# Patient Record
Sex: MALE | Race: WHITE | NOT HISPANIC OR LATINO | Employment: OTHER
[De-identification: names, ages, dates, MRNs, and addresses within clinical notes are randomized per-mention and may not be internally consistent; named-entity substitution may affect disease eponyms.]

---

## 2017-02-03 ENCOUNTER — RECORDS - HEALTHEAST (OUTPATIENT)
Dept: ADMINISTRATIVE | Facility: OTHER | Age: 57
End: 2017-02-03

## 2020-01-04 ENCOUNTER — RECORDS - HEALTHEAST (OUTPATIENT)
Dept: ADMINISTRATIVE | Facility: OTHER | Age: 60
End: 2020-01-04

## 2020-01-04 LAB
CHOLEST SERPL-MCNC: 174 MG/DL
HDLC SERPL-MCNC: 50 MG/DL
LDLC SERPL CALC-MCNC: 107 MG/DL
NON HDL CHOL. (LDL+VLDL): 124 MG/DL
TRIGLYCERIDES (HISTORICAL CONVERSION): 77 MG/DL

## 2020-02-20 ENCOUNTER — OFFICE VISIT - HEALTHEAST (OUTPATIENT)
Dept: FAMILY MEDICINE | Facility: CLINIC | Age: 60
End: 2020-02-20

## 2020-02-20 DIAGNOSIS — Z00.00 HEALTH CARE MAINTENANCE: ICD-10-CM

## 2020-02-20 DIAGNOSIS — R73.01 IMPAIRED FASTING GLUCOSE: ICD-10-CM

## 2020-02-20 DIAGNOSIS — N40.1 BENIGN PROSTATIC HYPERPLASIA WITH URINARY FREQUENCY: ICD-10-CM

## 2020-02-20 DIAGNOSIS — E78.5 HYPERLIPIDEMIA, UNSPECIFIED HYPERLIPIDEMIA TYPE: ICD-10-CM

## 2020-02-20 DIAGNOSIS — J45.20 MILD INTERMITTENT ASTHMA WITHOUT COMPLICATION: ICD-10-CM

## 2020-02-20 DIAGNOSIS — F32.1 CURRENT MODERATE EPISODE OF MAJOR DEPRESSIVE DISORDER WITHOUT PRIOR EPISODE (H): ICD-10-CM

## 2020-02-20 DIAGNOSIS — G47.00 INSOMNIA, UNSPECIFIED TYPE: ICD-10-CM

## 2020-02-20 DIAGNOSIS — R35.0 BENIGN PROSTATIC HYPERPLASIA WITH URINARY FREQUENCY: ICD-10-CM

## 2020-02-20 DIAGNOSIS — Z12.11 SCREEN FOR COLON CANCER: ICD-10-CM

## 2020-02-20 LAB — HBA1C MFR BLD: 6 % (ref 3.5–6)

## 2020-02-20 RX ORDER — AZELASTINE HYDROCHLORIDE AND FLUTICASONE PROPIONATE 137; 50 UG/1; UG/1
SPRAY, METERED NASAL
Status: SHIPPED | COMMUNITY
Start: 2019-12-28 | End: 2021-11-01

## 2020-02-20 RX ORDER — NAPROXEN SODIUM 220 MG
220 TABLET ORAL 2 TIMES DAILY WITH MEALS
Status: SHIPPED | COMMUNITY
Start: 2020-02-20 | End: 2023-07-26

## 2020-02-20 ASSESSMENT — ANXIETY QUESTIONNAIRES
2. NOT BEING ABLE TO STOP OR CONTROL WORRYING: NEARLY EVERY DAY
3. WORRYING TOO MUCH ABOUT DIFFERENT THINGS: NEARLY EVERY DAY
5. BEING SO RESTLESS THAT IT IS HARD TO SIT STILL: MORE THAN HALF THE DAYS
6. BECOMING EASILY ANNOYED OR IRRITABLE: NEARLY EVERY DAY
GAD7 TOTAL SCORE: 19
1. FEELING NERVOUS, ANXIOUS, OR ON EDGE: NEARLY EVERY DAY
4. TROUBLE RELAXING: MORE THAN HALF THE DAYS
7. FEELING AFRAID AS IF SOMETHING AWFUL MIGHT HAPPEN: NEARLY EVERY DAY
IF YOU CHECKED OFF ANY PROBLEMS ON THIS QUESTIONNAIRE, HOW DIFFICULT HAVE THESE PROBLEMS MADE IT FOR YOU TO DO YOUR WORK, TAKE CARE OF THINGS AT HOME, OR GET ALONG WITH OTHER PEOPLE: EXTREMELY DIFFICULT

## 2020-02-20 ASSESSMENT — MIFFLIN-ST. JEOR: SCORE: 1842.96

## 2020-02-20 ASSESSMENT — PATIENT HEALTH QUESTIONNAIRE - PHQ9: SUM OF ALL RESPONSES TO PHQ QUESTIONS 1-9: 19

## 2020-02-23 ENCOUNTER — COMMUNICATION - HEALTHEAST (OUTPATIENT)
Dept: FAMILY MEDICINE | Facility: CLINIC | Age: 60
End: 2020-02-23

## 2020-02-28 ENCOUNTER — RECORDS - HEALTHEAST (OUTPATIENT)
Dept: HEALTH INFORMATION MANAGEMENT | Facility: CLINIC | Age: 60
End: 2020-02-28

## 2020-03-16 ENCOUNTER — OFFICE VISIT - HEALTHEAST (OUTPATIENT)
Dept: FAMILY MEDICINE | Facility: CLINIC | Age: 60
End: 2020-03-16

## 2020-03-16 DIAGNOSIS — F32.1 CURRENT MODERATE EPISODE OF MAJOR DEPRESSIVE DISORDER WITHOUT PRIOR EPISODE (H): ICD-10-CM

## 2020-03-16 DIAGNOSIS — N40.0 BENIGN PROSTATIC HYPERPLASIA WITHOUT LOWER URINARY TRACT SYMPTOMS: ICD-10-CM

## 2020-03-16 DIAGNOSIS — J45.20 MILD INTERMITTENT ASTHMA WITHOUT COMPLICATION: ICD-10-CM

## 2020-03-16 RX ORDER — TAMSULOSIN HYDROCHLORIDE 0.4 MG/1
CAPSULE ORAL DAILY
Status: SHIPPED | COMMUNITY
Start: 2020-02-28 | End: 2023-02-26

## 2020-05-26 ENCOUNTER — RECORDS - HEALTHEAST (OUTPATIENT)
Dept: ADMINISTRATIVE | Facility: OTHER | Age: 60
End: 2020-05-26

## 2020-12-03 ENCOUNTER — RECORDS - HEALTHEAST (OUTPATIENT)
Dept: ADMINISTRATIVE | Facility: OTHER | Age: 60
End: 2020-12-03

## 2020-12-04 ENCOUNTER — OFFICE VISIT - HEALTHEAST (OUTPATIENT)
Dept: FAMILY MEDICINE | Facility: CLINIC | Age: 60
End: 2020-12-04

## 2020-12-04 DIAGNOSIS — F32.1 CURRENT MODERATE EPISODE OF MAJOR DEPRESSIVE DISORDER WITHOUT PRIOR EPISODE (H): ICD-10-CM

## 2020-12-04 ASSESSMENT — ANXIETY QUESTIONNAIRES
7. FEELING AFRAID AS IF SOMETHING AWFUL MIGHT HAPPEN: SEVERAL DAYS
1. FEELING NERVOUS, ANXIOUS, OR ON EDGE: SEVERAL DAYS
3. WORRYING TOO MUCH ABOUT DIFFERENT THINGS: SEVERAL DAYS
4. TROUBLE RELAXING: NOT AT ALL
GAD7 TOTAL SCORE: 6
5. BEING SO RESTLESS THAT IT IS HARD TO SIT STILL: SEVERAL DAYS
2. NOT BEING ABLE TO STOP OR CONTROL WORRYING: SEVERAL DAYS
6. BECOMING EASILY ANNOYED OR IRRITABLE: SEVERAL DAYS

## 2020-12-04 ASSESSMENT — PATIENT HEALTH QUESTIONNAIRE - PHQ9: SUM OF ALL RESPONSES TO PHQ QUESTIONS 1-9: 8

## 2021-01-04 ENCOUNTER — OFFICE VISIT - HEALTHEAST (OUTPATIENT)
Dept: FAMILY MEDICINE | Facility: CLINIC | Age: 61
End: 2021-01-04

## 2021-01-04 DIAGNOSIS — F32.1 CURRENT MODERATE EPISODE OF MAJOR DEPRESSIVE DISORDER WITHOUT PRIOR EPISODE (H): ICD-10-CM

## 2021-01-04 RX ORDER — ESCITALOPRAM OXALATE 20 MG/1
20 TABLET ORAL DAILY
Qty: 90 TABLET | Refills: 2 | Status: SHIPPED | OUTPATIENT
Start: 2021-01-04 | End: 2021-10-11

## 2021-01-04 ASSESSMENT — PATIENT HEALTH QUESTIONNAIRE - PHQ9: SUM OF ALL RESPONSES TO PHQ QUESTIONS 1-9: 8

## 2021-01-12 ENCOUNTER — RECORDS - HEALTHEAST (OUTPATIENT)
Dept: ADMINISTRATIVE | Facility: OTHER | Age: 61
End: 2021-01-12

## 2021-01-25 ENCOUNTER — RECORDS - HEALTHEAST (OUTPATIENT)
Dept: ADMINISTRATIVE | Facility: OTHER | Age: 61
End: 2021-01-25

## 2021-02-01 ENCOUNTER — RECORDS - HEALTHEAST (OUTPATIENT)
Dept: ADMINISTRATIVE | Facility: OTHER | Age: 61
End: 2021-02-01

## 2021-03-25 ENCOUNTER — IMMUNIZATION (OUTPATIENT)
Dept: NURSING | Facility: CLINIC | Age: 61
End: 2021-03-25
Payer: COMMERCIAL

## 2021-03-25 PROCEDURE — 0001A PR COVID VAC PFIZER DIL RECON 30 MCG/0.3 ML IM: CPT

## 2021-03-25 PROCEDURE — 91300 PR COVID VAC PFIZER DIL RECON 30 MCG/0.3 ML IM: CPT

## 2021-04-15 ENCOUNTER — IMMUNIZATION (OUTPATIENT)
Dept: NURSING | Facility: CLINIC | Age: 61
End: 2021-04-15
Attending: INTERNAL MEDICINE
Payer: COMMERCIAL

## 2021-04-15 PROCEDURE — 0002A PR COVID VAC PFIZER DIL RECON 30 MCG/0.3 ML IM: CPT

## 2021-04-15 PROCEDURE — 91300 PR COVID VAC PFIZER DIL RECON 30 MCG/0.3 ML IM: CPT

## 2021-05-02 ENCOUNTER — HEALTH MAINTENANCE LETTER (OUTPATIENT)
Age: 61
End: 2021-05-02

## 2021-05-26 ASSESSMENT — PATIENT HEALTH QUESTIONNAIRE - PHQ9: SUM OF ALL RESPONSES TO PHQ QUESTIONS 1-9: 8

## 2021-05-27 ASSESSMENT — PATIENT HEALTH QUESTIONNAIRE - PHQ9
SUM OF ALL RESPONSES TO PHQ QUESTIONS 1-9: 19
SUM OF ALL RESPONSES TO PHQ QUESTIONS 1-9: 8

## 2021-05-28 ASSESSMENT — ANXIETY QUESTIONNAIRES
GAD7 TOTAL SCORE: 19
GAD7 TOTAL SCORE: 6

## 2021-05-28 ASSESSMENT — ASTHMA QUESTIONNAIRES: ACT_TOTALSCORE: 20

## 2021-06-04 VITALS
DIASTOLIC BLOOD PRESSURE: 70 MMHG | HEART RATE: 51 BPM | BODY MASS INDEX: 29.84 KG/M2 | SYSTOLIC BLOOD PRESSURE: 112 MMHG | OXYGEN SATURATION: 97 % | WEIGHT: 223.1 LBS

## 2021-06-04 VITALS
HEART RATE: 52 BPM | WEIGHT: 218.7 LBS | HEIGHT: 73 IN | SYSTOLIC BLOOD PRESSURE: 124 MMHG | DIASTOLIC BLOOD PRESSURE: 90 MMHG | OXYGEN SATURATION: 99 % | BODY MASS INDEX: 28.98 KG/M2

## 2021-06-06 NOTE — PROGRESS NOTES
Assessment and Plan:     1. Current moderate episode of major depressive disorder without prior episode (H)  Obtain PHQ 9 score of 19 and ANTONIO score of 19.  Discussed treatment options.  Will start sertraline 50 mg daily.  Educated on indications and side effects including increased risk of suicide.  He is to follow-up in 1 month.  Offered referral to counseling, but he declines.  I encouraged him to seek out a support group.  - sertraline (ZOLOFT) 50 MG tablet; Take 1 tablet (50 mg total) by mouth daily.  Dispense: 30 tablet; Refill: 0    2. Impaired fasting glucose  We will check A1c and notify patient of results.  - Glycosylated Hemoglobin A1c    3. Hyperlipidemia, unspecified hyperlipidemia type  This is controlled.    4. Mild intermittent asthma without complication  Obtained ACT score of 20.  He continues albuterol as needed.    5. Insomnia, unspecified type  This is controlled.  He continues hydroxyzine.    6. Benign prostatic hyperplasia with urinary frequency  He continues to see urology.    7. Screen for colon cancer  - Ambulatory referral for Colonoscopy    8. Health care maintenance  - Td, Preservative Free (green label)    The patient is content with the plan and will follow-up in 6 months for medication management or sooner with any further concerns.       Subjective:     Segundo is a 59 y.o. male presenting to the clinic for concerns for depression.  His wife was diagnosed with leiomyosarcoma in 2016.  It was under control, but has started worsening recently.  She is seeing the [facility].  She is receiving chemo and radiation.  He does feel supported by family and friends.  He denies thoughts of suicide.  He is tearful while speaking about being strong for her.  Patient is working more to pay the bills.  He works as a .  He is interested in an antidepressant today.  Secondly, he obtains physicals through his workplace.  He has a history of dyslipidemia.  Last  cholesterol check was on 1/4/2020 where his total cholesterol was 174, HDL 50, triglycerides 77, .  He denies chest pain, shortness of breath with exertion, edema, orthopnea, syncope.  His glucose was 105.  His PSA is 0.6.  He has a history of BPH and sees urology.  Patient has intermittent asthma and allergies.  He takes montelukast daily and uses albuterol as needed.  He feels as though his asthma is under control.  He uses hydroxyzine to assist with insomnia at bedtime.    Review of Systems: A complete 14 point review of systems was obtained and is negative or as stated in the history of present illness.    Social History     Socioeconomic History     Marital status:      Spouse name: Not on file     Number of children: Not on file     Years of education: Not on file     Highest education level: Not on file   Occupational History     Not on file   Social Needs     Financial resource strain: Not on file     Food insecurity:     Worry: Not on file     Inability: Not on file     Transportation needs:     Medical: Not on file     Non-medical: Not on file   Tobacco Use     Smoking status: Never Smoker     Smokeless tobacco: Current User     Types: Chew   Substance and Sexual Activity     Alcohol use: Yes     Alcohol/week: 2.0 standard drinks     Types: 2 Cans of beer per week     Drug use: No     Sexual activity: Yes     Partners: Female     Comment:    Lifestyle     Physical activity:     Days per week: Not on file     Minutes per session: Not on file     Stress: Not on file   Relationships     Social connections:     Talks on phone: Not on file     Gets together: Not on file     Attends [employer] service: Not on file     Active member of club or organization: Not on file     Attends meetings of clubs or organizations: Not on file     Relationship status: Not on file     Intimate partner violence:     Fear of current or ex partner: Not on file     Emotionally abused: Not on file     Physically  "abused: Not on file     Forced sexual activity: Not on file   Other Topics Concern     Not on file   Social History Narrative     Not on file       Active Ambulatory Problems     Diagnosis Date Noted     Allergic Rhinitis      Eczema      Dyslipidemia      Asthma      Benign Prostatic Hypertrophy      Lethargy      Impaired Fasting Glucose      Resolved Ambulatory Problems     Diagnosis Date Noted     No Resolved Ambulatory Problems     No Additional Past Medical History       Family History   Problem Relation Age of Onset     No Medical Problems Mother      No Medical Problems Father        Objective:     /90 (Patient Site: Left Arm, Cuff Size: Adult Large)   Pulse (!) 52   Ht 6' 0.5\" (1.842 m)   Wt 218 lb 11.2 oz (99.2 kg)   SpO2 99%   BMI 29.25 kg/m      Patient is alert, in no obvious distress.   Skin: Warm, dry.  No lesions or rashes.  Skin turgor rapid return.   HEENT:  Head normocephalic, atraumatic.  Eyes normal. Ears normal.  Nose patent, mucosa pink.  Oropharynx mucosa pink.  No lesions or tonsillar enlargement.   Neck: Supple, no lymphadenopathy. No thyromegaly.  Lungs:  Clear to auscultation. Respirations even and unlabored.  No wheezing or rales noted.   Heart:  Regular rate and rhythm.  No murmurs. .                "

## 2021-06-06 NOTE — PROGRESS NOTES
Assessment and Plan:     1. Current moderate episode of major depressive disorder without prior episode (H)  This is uncontrolled.  Discussed treatment options.  Will increase sertraline to 100 mg daily.  Educated on indications and side effects include increased risk of suicide.  - sertraline (ZOLOFT) 100 MG tablet; Take 1 tablet (100 mg total) by mouth daily.  Dispense: 90 tablet; Refill: 1    2. Mild intermittent asthma without complication  This is controlled.    3. Benign prostatic hyperplasia without lower urinary tract symptoms  This is controlled.  He continues tamsulosin.    The patient is content with the plan and will follow-up in 1 month for medication management or sooner with any further concerns.       Subjective:     Segundo is a 59 y.o. male presenting to the clinic for medication management.  Patient is taking sertraline 50 mg daily for depression.  Patient presents today stating he has not seen much of a difference in taking the medication.  He denies any side effects.  His wife has leiomyosarcoma and is receiving treatment at [facility].  She is receiving chemotherapy and will be having a CT to see if it is helping.  Patient has had lack of motivation and feels less energetic.  He has difficulty getting out of bed in the morning.  He denies thoughts of suicide.  Patient has BPH and is taking tamsulosin which is work working well for him.  He has asthma and denies any recent flares.    Review of Systems: A complete 14 point review of systems was obtained and is negative or as stated in the history of present illness.    Social History     Socioeconomic History     Marital status:      Spouse name: Not on file     Number of children: Not on file     Years of education: Not on file     Highest education level: Not on file   Occupational History     Not on file   Social Needs     Financial resource strain: Not on file     Food insecurity     Worry: Not on file     Inability: Not  on file     Transportation needs     Medical: Not on file     Non-medical: Not on file   Tobacco Use     Smoking status: Never Smoker     Smokeless tobacco: Current User     Types: Chew   Substance and Sexual Activity     Alcohol use: Yes     Alcohol/week: 2.0 standard drinks     Types: 2 Cans of beer per week     Drug use: No     Sexual activity: Yes     Partners: Female     Comment:    Lifestyle     Physical activity     Days per week: Not on file     Minutes per session: Not on file     Stress: Not on file   Relationships     Social connections     Talks on phone: Not on file     Gets together: Not on file     Attends Jewish service: Not on file     Active member of club or organization: Not on file     Attends meetings of clubs or organizations: Not on file     Relationship status: Not on file     Intimate partner violence     Fear of current or ex partner: Not on file     Emotionally abused: Not on file     Physically abused: Not on file     Forced sexual activity: Not on file   Other Topics Concern     Not on file   Social History Narrative     Not on file       Active Ambulatory Problems     Diagnosis Date Noted     Allergic Rhinitis      Eczema      Dyslipidemia      Asthma      Benign Prostatic Hypertrophy      Lethargy      Impaired Fasting Glucose      Resolved Ambulatory Problems     Diagnosis Date Noted     No Resolved Ambulatory Problems     No Additional Past Medical History       Family History   Problem Relation Age of Onset     No Medical Problems Mother      Hypertension Father        Objective:     /70 (Patient Site: Right Arm, Cuff Size: Adult Large)   Pulse (!) 51   Wt (!) 223 lb 1.6 oz (101.2 kg)   SpO2 97%   BMI 29.84 kg/m      Patient is alert, in no obvious distress.   Skin: Warm, dry.    Lungs:  Clear to auscultation. Respirations even and unlabored.  No wheezing or rales noted.   Heart:  Regular rate and rhythm.  No murmurs, S3, S4, gallops, or rubs.    Abdomen: Soft,  nontender.  No organomegaly. Bowel sounds normoactive. No guarding or masses noted.

## 2021-06-13 NOTE — PROGRESS NOTES
"Segundo Sellers is a 60 y.o. male who is being evaluated via a billable telephone visit.      The patient has been notified of following:     \"This telephone visit will be conducted via a call between you and your physician/provider. We have found that certain health care needs can be provided without the need for a physical exam.  This service lets us provide the care you need with a short phone conversation.  If a prescription is necessary we can send it directly to your pharmacy.  If lab work is needed we can place an order for that and you can then stop by our lab to have the test done at a later time.    Telephone visits are billed at different rates depending on your insurance coverage. During this emergency period, for some insurers they may be billed the same as an in-person visit.  Please reach out to your insurance provider with any questions.    If during the course of the call the physician/provider feels a telephone visit is not appropriate, you will not be charged for this service.\"    Patient has given verbal consent to a Telephone visit? Yes    What phone number would you like to be contacted at? [phone]    Patient would like to receive their AVS by     Segundo Sellers is a 60 y.o. male is being seen today for management of major depression.  His wife was diagnosed with leiomyosarcoma back in 2015 and has been undergoing treatment since.  She continues to struggle with her cancer diagnosis.  She is a patient of mine and I know her situation well.  I have also seen Segundo's brother and parents for primary care and know his family history well.    He was initiated on treatment for major depression in this past year.  He was started on sertraline.  At the time of his initiation of medication his PHQ-9 score was 19.  He had a variety of symptoms at that time.  He reports with initiation of sertraline it helps with his emotions.  He is less often tearful and does not have swings in emotions.  He " "does report he continues to struggle rather profoundly with anhedonia, feeling down and having little energy.  His PHQ-9 score today is 8 representing an overall improvement but he does report ongoing significant dysfunction related to his lack of energy and feeling down.  We discussed other options for managing depression including consideration of other SSRI medications which may be more \"activating\".  We also discussed the possibility of cognitive behavioral therapy, he does describe having a good support system as well as resources through his workplace that he has utilized and does not feel he wishes to pursue specific therapy at this time.  He is very interested in considering a more \"activating\" SSRI to replace his current sertraline.    Assessment/Plan:      Diagnoses and all orders for this visit:    Current moderate episode of major depressive disorder without prior episode (H)  -     escitalopram oxalate (LEXAPRO) 10 MG tablet; Take 1 tablet (10 mg total) by mouth daily.  Dispense: 30 tablet; Refill: 3          We will taper down slightly on the sertraline for a limited time.  I recommend he take 50 mg or 1/2 tablet of his 100 mg sertraline tablet over the next 4 days.  On day 5 I would like him to stop the sertraline and begin the Escitalopram 10 mg once daily.  We will plan to reassess his situation in 1 month, sooner if there are problems.  Described the potential for withdrawal symptoms and or new side effects symptoms with new medication.  If these considerations occur he should notify me and we will work out a new plan sooner.  Otherwise we will plan to touch base in 1 month and make further adjustments to treatment as needed.      Phone call duration:  8 minutes    Segundo Dejesus MD  "

## 2021-06-14 NOTE — PROGRESS NOTES
Segundo Sellers is a 60 y.o. male who is being evaluated via a billable telephone visit.      What phone number would you like to be contacted at? [phone]  How would you like to obtain your AVS? AVS Preference: [employer].  Assessment & Plan     Diagnoses and all orders for this visit:    Current moderate episode of major depressive disorder without prior episode (H)  -     escitalopram oxalate (LEXAPRO) 20 MG tablet; Take 1 tablet (20 mg total) by mouth daily.      Increase dose to 20 mg.  Follow-up in 3 months.    6 minutes spent on the date of the encounter doing patient visit         No follow-ups on file.    Segundo Dejesus MD  [facility]     Segundo Sellers is 60 y.o. and presents to clinic today for the following health issues   HPI     He was seen for a virtual visit on December 4, 2020.  At that time management for major depression was changed from sertraline which was beneficial in some regard to Escitalopram in an effort to improve anhedonia, low energy.  We discussed a tapering process and change over process.  Patient reports no significant difficulties.  Patient reports overall improvement in mood.  He notes no side effects from the new medication.  He would like to increase the dose to see if he can get more out of it.  He continues to utilize resources that he has prior.  Denies considerations for any additional treatment options at this time.      Review of Systems  Complete review of systems is obtained.  Other than the specific considerations noted above complete review of systems is negative.        Objective       Vitals:  No vitals were obtained today due to virtual visit.          Phone call duration: 6 minutes

## 2021-06-16 PROBLEM — F32.1 CURRENT MODERATE EPISODE OF MAJOR DEPRESSIVE DISORDER WITHOUT PRIOR EPISODE (H): Status: ACTIVE | Noted: 2020-03-16

## 2021-06-20 NOTE — LETTER
Letter by Jagruti Moss CNP at      Author: Jagurti Moss CNP Service: -- Author Type: --    Filed:  Encounter Date: 2/23/2020 Status: (Other)         Segundo Sellers  [address] [place] [address] [address]  [place] [address]             February 23, 2020         Dear Mr. Sellers,    Below are the results from your recent visit:    Resulted Orders   Glycosylated Hemoglobin A1c   Result Value Ref Range    Hemoglobin A1c 6.0 3.5 - 6.0 %       Your hemoglobin A1C (average blood sugars over 3 months) places you in the prediabetes range. We diagnose diabetes at 6.5%.   This means that if you continue on your path, you may develop diabetes in the future.   I recommend you consume a healthy diet (avoid white breads, refined carbohydrates, sweets) and exercise.        Please call with questions or contact us using [employer].    Sincerely,        Electronically signed by Jagruti Moss CNP

## 2021-10-17 ENCOUNTER — HEALTH MAINTENANCE LETTER (OUTPATIENT)
Age: 61
End: 2021-10-17

## 2021-10-29 PROBLEM — R39.9 LOWER URINARY TRACT SYMPTOMS: Status: ACTIVE | Noted: 2020-02-28

## 2021-10-29 PROBLEM — R10.2 PELVIC AND PERINEAL PAIN: Status: ACTIVE | Noted: 2020-04-22

## 2021-10-29 PROBLEM — E78.5 DYSLIPIDEMIA: Status: ACTIVE | Noted: 2021-10-29

## 2021-10-29 PROBLEM — R10.9 ABDOMINAL PAIN: Status: ACTIVE | Noted: 2020-02-28

## 2021-10-29 PROBLEM — R39.198 SLOWING OF URINARY STREAM: Status: ACTIVE | Noted: 2020-02-28

## 2021-10-29 RX ORDER — TRAMADOL HYDROCHLORIDE 50 MG/1
TABLET ORAL
COMMUNITY
End: 2021-11-01

## 2021-11-01 ENCOUNTER — OFFICE VISIT (OUTPATIENT)
Dept: FAMILY MEDICINE | Facility: CLINIC | Age: 61
End: 2021-11-01
Payer: COMMERCIAL

## 2021-11-01 VITALS
BODY MASS INDEX: 30.22 KG/M2 | HEIGHT: 73 IN | WEIGHT: 228 LBS | OXYGEN SATURATION: 98 % | SYSTOLIC BLOOD PRESSURE: 126 MMHG | HEART RATE: 58 BPM | DIASTOLIC BLOOD PRESSURE: 76 MMHG

## 2021-11-01 DIAGNOSIS — Z23 HIGH PRIORITY FOR 2019-NCOV VACCINE: Primary | ICD-10-CM

## 2021-11-01 DIAGNOSIS — F32.1 CURRENT MODERATE EPISODE OF MAJOR DEPRESSIVE DISORDER WITHOUT PRIOR EPISODE (H): ICD-10-CM

## 2021-11-01 PROCEDURE — 90471 IMMUNIZATION ADMIN: CPT | Performed by: FAMILY MEDICINE

## 2021-11-01 PROCEDURE — 0004A COVID-19,PF,PFIZER (12+ YRS): CPT | Performed by: FAMILY MEDICINE

## 2021-11-01 PROCEDURE — 91300 COVID-19,PF,PFIZER (12+ YRS): CPT | Performed by: FAMILY MEDICINE

## 2021-11-01 PROCEDURE — 99214 OFFICE O/P EST MOD 30 MIN: CPT | Mod: 25 | Performed by: FAMILY MEDICINE

## 2021-11-01 PROCEDURE — 90682 RIV4 VACC RECOMBINANT DNA IM: CPT | Performed by: FAMILY MEDICINE

## 2021-11-01 RX ORDER — BUPROPION HYDROCHLORIDE 150 MG/1
150 TABLET ORAL EVERY MORNING
Qty: 30 TABLET | Refills: 1 | Status: SHIPPED | OUTPATIENT
Start: 2021-11-01 | End: 2021-12-14

## 2021-11-01 RX ORDER — ESCITALOPRAM OXALATE 20 MG/1
TABLET ORAL
Qty: 90 TABLET | Refills: 3 | Status: SHIPPED | OUTPATIENT
Start: 2021-11-01 | End: 2022-11-04

## 2021-11-01 ASSESSMENT — ANXIETY QUESTIONNAIRES
6. BECOMING EASILY ANNOYED OR IRRITABLE: SEVERAL DAYS
7. FEELING AFRAID AS IF SOMETHING AWFUL MIGHT HAPPEN: MORE THAN HALF THE DAYS
7. FEELING AFRAID AS IF SOMETHING AWFUL MIGHT HAPPEN: MORE THAN HALF THE DAYS
5. BEING SO RESTLESS THAT IT IS HARD TO SIT STILL: SEVERAL DAYS
GAD7 TOTAL SCORE: 8
8. IF YOU CHECKED OFF ANY PROBLEMS, HOW DIFFICULT HAVE THESE MADE IT FOR YOU TO DO YOUR WORK, TAKE CARE OF THINGS AT HOME, OR GET ALONG WITH OTHER PEOPLE?: SOMEWHAT DIFFICULT
1. FEELING NERVOUS, ANXIOUS, OR ON EDGE: SEVERAL DAYS
2. NOT BEING ABLE TO STOP OR CONTROL WORRYING: SEVERAL DAYS
4. TROUBLE RELAXING: SEVERAL DAYS
3. WORRYING TOO MUCH ABOUT DIFFERENT THINGS: SEVERAL DAYS
GAD7 TOTAL SCORE: 8
GAD7 TOTAL SCORE: 8

## 2021-11-01 ASSESSMENT — PATIENT HEALTH QUESTIONNAIRE - PHQ9
10. IF YOU CHECKED OFF ANY PROBLEMS, HOW DIFFICULT HAVE THESE PROBLEMS MADE IT FOR YOU TO DO YOUR WORK, TAKE CARE OF THINGS AT HOME, OR GET ALONG WITH OTHER PEOPLE: SOMEWHAT DIFFICULT
SUM OF ALL RESPONSES TO PHQ QUESTIONS 1-9: 14
SUM OF ALL RESPONSES TO PHQ QUESTIONS 1-9: 14

## 2021-11-01 ASSESSMENT — MIFFLIN-ST. JEOR: SCORE: 1885.14

## 2021-11-01 NOTE — PROGRESS NOTES
"Segundo Sellers  /76   Pulse 58   Ht 1.842 m (6' 0.5\")   Wt 103.4 kg (228 lb)   SpO2 98%   BMI 30.50 kg/m       Assessment/Plan:                Segundo was seen today for imm/inj and depression.    Diagnoses and all orders for this visit:    High priority for 2019-nCoV vaccine    Current moderate episode of major depressive disorder without prior episode (H)  -     buPROPion (WELLBUTRIN XL) 150 MG 24 hr tablet; Take 1 tablet (150 mg) by mouth every morning  -     escitalopram (LEXAPRO) 20 MG tablet; TAKE 1 TABLET (20 MG TOTAL) BY MOUTH DAILY.    Other orders  -     COVID-19,PF,PFIZER (12+ Yrs)  -     INFLUENZA QUAD, PF (RIV4) (FLUBLOK)         DISCUSSION  Elected to add bupropion to his current Escitalopram.  Follow-up in 2 weeks.  Call with concerns for side effects or other concerns such as worsening symptoms.  Reassess in 2 weeks and decide on further course of action via phone visit.  Update vaccines today.  Subjective:     HPI:    Segundo Sellers is a 61 year old male is here today to follow-up on mental health.  His wife has leiomyosarcoma.  She has metastasis and has struggled with her cancer diagnosis since 2015.  She is currently having more issues.  He has depression symptoms that in large part stem out of his ongoing struggle he and his wife have with living with cancer.  He is on escitalopram which he finds much more beneficial than his previous sertraline.  He reports his energy level improved but he continues to torres decreased energy, memory difficulty and concentration difficulty.  He would like to consider medication therapy.  We discussed the possibility of cognitive behavioral therapy but he does not want to pursue this at this time.  He feels he has a good support system.  Discussed symptoms and potential medication treatment options.    ROS:  Complete review of systems is obtained.  Other than the specific considerations noted above complete review of systems is " negative.    Objective:   Medications:  Current Outpatient Medications   Medication     albuterol (PROAIR HFA) 90 mcg/actuation inhaler     buPROPion (WELLBUTRIN XL) 150 MG 24 hr tablet     escitalopram (LEXAPRO) 20 MG tablet     montelukast (SINGULAIR) 10 mg tablet     naproxen sodium (ALEVE) 220 MG tablet     tamsulosin (FLOMAX) 0.4 mg cap     No current facility-administered medications for this visit.        Allergies:   No Known Allergies     Social History     Socioeconomic History     Marital status:      Spouse name: Not on file     Number of children: Not on file     Years of education: Not on file     Highest education level: Not on file   Occupational History     Not on file   Tobacco Use     Smoking status: Never Smoker     Smokeless tobacco: Current User     Types: Chew, Chew   Substance and Sexual Activity     Alcohol use: Yes     Alcohol/week: 2.0 standard drinks     Drug use: No     Sexual activity: Yes     Partners: Female     Comment:    Other Topics Concern     Not on file   Social History Narrative     Not on file     Social Determinants of Health     Financial Resource Strain:      Difficulty of Paying Living Expenses:    Food Insecurity:      Worried About Running Out of Food in the Last Year:      Ran Out of Food in the Last Year:    Transportation Needs:      Lack of Transportation (Medical):      Lack of Transportation (Non-Medical):    Physical Activity:      Days of Exercise per Week:      Minutes of Exercise per Session:    Stress:      Feeling of Stress :    Social Connections:      Frequency of Communication with Friends and Family:      Frequency of Social Gatherings with Friends and Family:      Attends Jainism Services:      Active Member of Clubs or Organizations:      Attends Club or Organization Meetings:      Marital Status:    Intimate Partner Violence:      Fear of Current or Ex-Partner:      Emotionally Abused:      Physically Abused:      Sexually Abused:   "      Family History   Problem Relation Age of Onset     No Known Problems Mother      Hypertension Father         Most Recent Immunizations   Administered Date(s) Administered     COVID-19,PF,Pfizer (12+ Yrs) 04/15/2021     Flu, Unspecified 03/02/2012     Influenza (IIV3) PF 12/17/2014     Influenza Vaccine IM > 6 months Valent IIV4 (Alfuria,Fluzone) 11/23/2020     Pneumococcal 23 valent 03/02/2012     TD (ADULT, 7+) 02/20/2020     Tdap (Adacel,Boostrix) 04/02/2019     Tdap (Adult) Unspecified Formulation 01/23/2009   Pended Date(s) Pended     COVID-19,PF,Pfizer (12+ Yrs) 11/01/2021     Influenza Quad, Recombinant, pf(RIV4) (Flublok) 11/01/2021        Wt Readings from Last 3 Encounters:   11/01/21 103.4 kg (228 lb)   03/16/20 101.2 kg (223 lb 1.6 oz)   02/20/20 99.2 kg (218 lb 11.2 oz)        BP Readings from Last 6 Encounters:   11/01/21 126/76   03/16/20 112/70   02/20/20 (!) 124/90        Hemoglobin A1C   Date Value Ref Range Status   02/20/2020 6.0 3.5 - 6.0 % Final   03/02/2012 5.7 3.5 - 6.0 % Final      PHYSICAL EXAM:    /76   Pulse 58   Ht 1.842 m (6' 0.5\")   Wt 103.4 kg (228 lb)   SpO2 98%   BMI 30.50 kg/m           General Appearance:    Alert, cooperative, no distress     Answers for HPI/ROS submitted by the patient on 11/1/2021  If you checked off any problems, how difficult have these problems made it for you to do your work, take care of things at home, or get along with other people?: Somewhat difficult  PHQ9 TOTAL SCORE: 14  ANTONIO 7 TOTAL SCORE: 8      "

## 2021-11-02 ASSESSMENT — ANXIETY QUESTIONNAIRES: GAD7 TOTAL SCORE: 8

## 2021-11-02 ASSESSMENT — PATIENT HEALTH QUESTIONNAIRE - PHQ9: SUM OF ALL RESPONSES TO PHQ QUESTIONS 1-9: 14

## 2021-11-05 ENCOUNTER — TELEPHONE (OUTPATIENT)
Dept: FAMILY MEDICINE | Facility: CLINIC | Age: 61
End: 2021-11-05
Payer: COMMERCIAL

## 2021-11-05 NOTE — TELEPHONE ENCOUNTER
PA Initiation    Medication: Bupropion   Insurance Company:  [facility]  Pharmacy Filling the Rx:  [facility]  Filling Pharmacy Phone:  [phone]  Filling Pharmacy Fax:  [phone]  Start Date:  11/01/2021

## 2021-11-08 NOTE — TELEPHONE ENCOUNTER
Prior Authorization Approval    Authorization Effective Date: 11/8/2021  Authorization Expiration Date: 11/8/2022  Medication: Bupropion -APPROVED  Approved Dose/Quantity:   Reference #:     Insurance Company: [employer] ([facility]) - Phone [phone] Fax [phone]  Expected CoPay:       CoPay Card Available:      Foundation Assistance Needed:    Which Pharmacy is filling the prescription (Not needed for infusion/clinic administered): [facility] - [place], [place] - [address]  Pharmacy Notified: Yes  Patient Notified: No    Pharmacy will notify patient when medication is ready.

## 2021-11-08 NOTE — TELEPHONE ENCOUNTER
Central Prior Authorization Team   Phone: [phone]      PA Initiation    Medication: Bupropion -Initiated  Insurance Company: [employer] ([facility]) - Phone [phone] Fax [phone]  Pharmacy Filling the Rx: [facility] - [place] - [address]  Filling Pharmacy Phone: [phone]  Filling Pharmacy Fax:    Start Date: 11/8/2021

## 2022-01-04 ENCOUNTER — TRANSFERRED RECORDS (OUTPATIENT)
Dept: HEALTH INFORMATION MANAGEMENT | Facility: CLINIC | Age: 62
End: 2022-01-04
Payer: COMMERCIAL

## 2022-02-25 DIAGNOSIS — F32.1 CURRENT MODERATE EPISODE OF MAJOR DEPRESSIVE DISORDER WITHOUT PRIOR EPISODE (H): ICD-10-CM

## 2022-02-28 RX ORDER — ESCITALOPRAM OXALATE 20 MG/1
TABLET ORAL
Qty: 90 TABLET | Refills: 0 | OUTPATIENT
Start: 2022-02-28

## 2022-04-13 ENCOUNTER — IMMUNIZATION (OUTPATIENT)
Dept: NURSING | Facility: CLINIC | Age: 62
End: 2022-04-13
Payer: COMMERCIAL

## 2022-04-13 PROCEDURE — 91305 COVID-19,PF,PFIZER (12+ YRS): CPT

## 2022-04-13 PROCEDURE — 0054A COVID-19,PF,PFIZER (12+ YRS): CPT

## 2022-05-02 ENCOUNTER — TRANSFERRED RECORDS (OUTPATIENT)
Dept: HEALTH INFORMATION MANAGEMENT | Facility: CLINIC | Age: 62
End: 2022-05-02
Payer: COMMERCIAL

## 2022-05-28 ENCOUNTER — HEALTH MAINTENANCE LETTER (OUTPATIENT)
Age: 62
End: 2022-05-28

## 2022-08-15 ENCOUNTER — OFFICE VISIT (OUTPATIENT)
Dept: FAMILY MEDICINE | Facility: CLINIC | Age: 62
End: 2022-08-15
Payer: COMMERCIAL

## 2022-08-15 VITALS
HEIGHT: 73 IN | DIASTOLIC BLOOD PRESSURE: 76 MMHG | WEIGHT: 219.9 LBS | SYSTOLIC BLOOD PRESSURE: 130 MMHG | RESPIRATION RATE: 18 BRPM | HEART RATE: 54 BPM | OXYGEN SATURATION: 98 % | BODY MASS INDEX: 29.14 KG/M2

## 2022-08-15 DIAGNOSIS — F43.21 ADJUSTMENT DISORDER WITH DEPRESSED MOOD: ICD-10-CM

## 2022-08-15 DIAGNOSIS — F32.1 CURRENT MODERATE EPISODE OF MAJOR DEPRESSIVE DISORDER WITHOUT PRIOR EPISODE (H): ICD-10-CM

## 2022-08-15 DIAGNOSIS — F90.0 ATTENTION DEFICIT HYPERACTIVITY DISORDER (ADHD), PREDOMINANTLY INATTENTIVE TYPE: Primary | ICD-10-CM

## 2022-08-15 PROCEDURE — 99214 OFFICE O/P EST MOD 30 MIN: CPT | Performed by: FAMILY MEDICINE

## 2022-08-15 RX ORDER — DEXTROAMPHETAMINE SACCHARATE, AMPHETAMINE ASPARTATE MONOHYDRATE, DEXTROAMPHETAMINE SULFATE AND AMPHETAMINE SULFATE 2.5; 2.5; 2.5; 2.5 MG/1; MG/1; MG/1; MG/1
10 CAPSULE, EXTENDED RELEASE ORAL DAILY
Qty: 30 CAPSULE | Refills: 0 | Status: SHIPPED | OUTPATIENT
Start: 2022-08-15 | End: 2022-11-28 | Stop reason: DRUGHIGH

## 2022-08-15 ASSESSMENT — ANXIETY QUESTIONNAIRES
3. WORRYING TOO MUCH ABOUT DIFFERENT THINGS: SEVERAL DAYS
6. BECOMING EASILY ANNOYED OR IRRITABLE: SEVERAL DAYS
8. IF YOU CHECKED OFF ANY PROBLEMS, HOW DIFFICULT HAVE THESE MADE IT FOR YOU TO DO YOUR WORK, TAKE CARE OF THINGS AT HOME, OR GET ALONG WITH OTHER PEOPLE?: SOMEWHAT DIFFICULT
GAD7 TOTAL SCORE: 6
1. FEELING NERVOUS, ANXIOUS, OR ON EDGE: SEVERAL DAYS
7. FEELING AFRAID AS IF SOMETHING AWFUL MIGHT HAPPEN: MORE THAN HALF THE DAYS
2. NOT BEING ABLE TO STOP OR CONTROL WORRYING: SEVERAL DAYS
GAD7 TOTAL SCORE: 6
GAD7 TOTAL SCORE: 6
7. FEELING AFRAID AS IF SOMETHING AWFUL MIGHT HAPPEN: MORE THAN HALF THE DAYS
5. BEING SO RESTLESS THAT IT IS HARD TO SIT STILL: NOT AT ALL
4. TROUBLE RELAXING: NOT AT ALL
IF YOU CHECKED OFF ANY PROBLEMS ON THIS QUESTIONNAIRE, HOW DIFFICULT HAVE THESE PROBLEMS MADE IT FOR YOU TO DO YOUR WORK, TAKE CARE OF THINGS AT HOME, OR GET ALONG WITH OTHER PEOPLE: SOMEWHAT DIFFICULT

## 2022-08-15 ASSESSMENT — PATIENT HEALTH QUESTIONNAIRE - PHQ9
SUM OF ALL RESPONSES TO PHQ QUESTIONS 1-9: 7
SUM OF ALL RESPONSES TO PHQ QUESTIONS 1-9: 7
10. IF YOU CHECKED OFF ANY PROBLEMS, HOW DIFFICULT HAVE THESE PROBLEMS MADE IT FOR YOU TO DO YOUR WORK, TAKE CARE OF THINGS AT HOME, OR GET ALONG WITH OTHER PEOPLE: SOMEWHAT DIFFICULT

## 2022-08-15 ASSESSMENT — ASTHMA QUESTIONNAIRES
QUESTION_2 LAST FOUR WEEKS HOW OFTEN HAVE YOU HAD SHORTNESS OF BREATH: NOT AT ALL
QUESTION_5 LAST FOUR WEEKS HOW WOULD YOU RATE YOUR ASTHMA CONTROL: COMPLETELY CONTROLLED
QUESTION_1 LAST FOUR WEEKS HOW MUCH OF THE TIME DID YOUR ASTHMA KEEP YOU FROM GETTING AS MUCH DONE AT WORK, SCHOOL OR AT HOME: NONE OF THE TIME
QUESTION_3 LAST FOUR WEEKS HOW OFTEN DID YOUR ASTHMA SYMPTOMS (WHEEZING, COUGHING, SHORTNESS OF BREATH, CHEST TIGHTNESS OR PAIN) WAKE YOU UP AT NIGHT OR EARLIER THAN USUAL IN THE MORNING: NOT AT ALL
QUESTION_4 LAST FOUR WEEKS HOW OFTEN HAVE YOU USED YOUR RESCUE INHALER OR NEBULIZER MEDICATION (SUCH AS ALBUTEROL): NOT AT ALL
ACT_TOTALSCORE: 25
ACT_TOTALSCORE: 25

## 2022-08-15 ASSESSMENT — PAIN SCALES - GENERAL: PAINLEVEL: NO PAIN (0)

## 2022-08-15 NOTE — PROGRESS NOTES
"Segundo Sellers  /76   Pulse 54   Resp 18   Ht 1.842 m (6' 0.5\")   Wt 99.7 kg (219 lb 14.4 oz)   SpO2 98%   BMI 29.41 kg/m       Assessment/Plan:                Segundo was seen today for mental health problem.    Diagnoses and all orders for this visit:    Attention deficit hyperactivity disorder (ADHD), predominantly inattentive type  -     amphetamine-dextroamphetamine (ADDERALL XR) 10 MG 24 hr capsule; Take 1 capsule (10 mg) by mouth daily    Current moderate episode of major depressive disorder without prior episode (H)    Adjustment disorder with depressed mood         DISCUSSION  See discussion below.  Subjective:     HPI:    Segundo Sellers is a 62 year old male he is here today to discuss ongoing mental health symptoms.  His wife continues to struggle with metastatic cancer.  He is her primary caregiver.  He has depression symptoms that include anhedonia and lack of energy primarily at this time.  He does note there is been a distinct improvement with escitalopram.  There was confusion today as to whether he is taking the bupropion but it seems as though he is.  He is extremely bothered by significant lack of energy especially in the early part of the day.  Although he sleeps well he finds it very difficult to be able to get out of bed in the morning.  In addition to the above described concerns patient has a lifelong history of attention deficit disorder.  He does note being on stimulant therapy in his youth but has not been more recently.    Today we discussed options as to how we might optimize his medication therapy.  He is not interested in stopping or changing the escitalopram.  We did discuss the potential for increasing bupropion assuming he is taking it.  He does not wish to do this.  We discussed options for reinitiating stimulant therapy and he would like to do this.  We elected to pursue starting generic Adderall 10 mg once daily.  We will follow-up in 2 weeks to " reassess.    ROS:  Complete review of systems is obtained.  Other than the specific considerations noted above complete review of systems is negative.          Objective:   Medications:  Current Outpatient Medications   Medication     albuterol (PROAIR HFA) 90 mcg/actuation inhaler     amphetamine-dextroamphetamine (ADDERALL XR) 10 MG 24 hr capsule     buPROPion (WELLBUTRIN XL) 150 MG 24 hr tablet     escitalopram (LEXAPRO) 20 MG tablet     montelukast (SINGULAIR) 10 mg tablet     naproxen sodium (ALEVE) 220 MG tablet     tamsulosin (FLOMAX) 0.4 mg cap     No current facility-administered medications for this visit.        Allergies:   No Known Allergies     Social History     Socioeconomic History     Marital status:      Spouse name: Not on file     Number of children: Not on file     Years of education: Not on file     Highest education level: Not on file   Occupational History     Not on file   Tobacco Use     Smoking status: Never Smoker     Smokeless tobacco: Current User     Types: Chew   Vaping Use     Vaping Use: Never used   Substance and Sexual Activity     Alcohol use: Yes     Alcohol/week: 2.0 standard drinks     Drug use: No     Sexual activity: Yes     Partners: Female     Comment:    Other Topics Concern     Not on file   Social History Narrative     Not on file     Social Determinants of Health     Financial Resource Strain: Not on file   Food Insecurity: Not on file   Transportation Needs: Not on file   Physical Activity: Not on file   Stress: Not on file   Social Connections: Not on file   Intimate Partner Violence: Not on file   Housing Stability: Not on file       Family History   Problem Relation Age of Onset     No Known Problems Mother      Hypertension Father         Most Recent Immunizations   Administered Date(s) Administered     COVID-19,PF,Pfizer (12+ Yrs) 11/01/2021     COVID-19,PF,Pfizer 12+ Yrs (2022 and After) 04/13/2022     Flu, Unspecified 03/02/2012     Influenza  "(IIV3) PF 12/17/2014     Influenza Quad, Recombinant, pf(RIV4) (Flublok) 11/01/2021     Influenza Vaccine IM > 6 months Valent IIV4 (Alfuria,Fluzone) 11/23/2020     Pneumococcal 23 valent 03/02/2012     TD (ADULT, 7+) 02/20/2020     Tdap (Adacel,Boostrix) 04/02/2019     Tdap (Adult) Unspecified Formulation 01/23/2009        Wt Readings from Last 3 Encounters:   08/15/22 99.7 kg (219 lb 14.4 oz)   11/01/21 103.4 kg (228 lb)   03/16/20 101.2 kg (223 lb 1.6 oz)        BP Readings from Last 6 Encounters:   08/15/22 130/76   11/01/21 126/76   03/16/20 112/70   02/20/20 (!) 124/90        Hemoglobin A1C   Date Value Ref Range Status   02/20/2020 6.0 3.5 - 6.0 % Final   03/02/2012 5.7 3.5 - 6.0 % Final        PHQ 1/4/2021 11/1/2021 8/15/2022   PHQ-9 Total Score 8 14 7   Q9: Thoughts of better off dead/self-harm past 2 weeks Not at all Not at all Not at all     ANTONIO-7 SCORE 12/4/2020 11/1/2021 8/15/2022   Total Score - 8 (mild anxiety) 6 (mild anxiety)   Total Score 6 8 6           PHYSICAL EXAM:    /76   Pulse 54   Resp 18   Ht 1.842 m (6' 0.5\")   Wt 99.7 kg (219 lb 14.4 oz)   SpO2 98%   BMI 29.41 kg/m       General: Patient alert no signs of distress.  His affect is a little bit brighter than previous encounters.      Answers for HPI/ROS submitted by the patient on 8/15/2022  If you checked off any problems, how difficult have these problems made it for you to do your work, take care of things at home, or get along with other people?: Somewhat difficult  PHQ9 TOTAL SCORE: 7  ANTONIO 7 TOTAL SCORE: 6  Depression/Anxiety: Depression & Anxiety  Status since last visit:: good  Anxiety since last: : good  Other associated symptoms of depression:: Yes  Other associated symotome: : Yes  Significant life event: : health concerns  Anxious:: No  Current substance use:: No  How many servings of fruits and vegetables do you eat daily?: 2-3  On average, how many sweetened beverages do you drink each day (Examples: soda, juice, " sweet tea, etc.  Do NOT count diet or artificially sweetened beverages)?: 1  How many minutes a day do you exercise enough to make your heart beat faster?: 9 or less  How many days a week do you exercise enough to make your heart beat faster?: 3 or less  How many days per week do you miss taking your medication?: 0

## 2022-08-22 ENCOUNTER — TRANSFERRED RECORDS (OUTPATIENT)
Dept: HEALTH INFORMATION MANAGEMENT | Facility: CLINIC | Age: 62
End: 2022-08-22

## 2022-08-28 ASSESSMENT — ANXIETY QUESTIONNAIRES
7. FEELING AFRAID AS IF SOMETHING AWFUL MIGHT HAPPEN: MORE THAN HALF THE DAYS
4. TROUBLE RELAXING: NOT AT ALL
7. FEELING AFRAID AS IF SOMETHING AWFUL MIGHT HAPPEN: MORE THAN HALF THE DAYS
GAD7 TOTAL SCORE: 6
5. BEING SO RESTLESS THAT IT IS HARD TO SIT STILL: NOT AT ALL
6. BECOMING EASILY ANNOYED OR IRRITABLE: SEVERAL DAYS
IF YOU CHECKED OFF ANY PROBLEMS ON THIS QUESTIONNAIRE, HOW DIFFICULT HAVE THESE PROBLEMS MADE IT FOR YOU TO DO YOUR WORK, TAKE CARE OF THINGS AT HOME, OR GET ALONG WITH OTHER PEOPLE: SOMEWHAT DIFFICULT
GAD7 TOTAL SCORE: 6
2. NOT BEING ABLE TO STOP OR CONTROL WORRYING: SEVERAL DAYS
3. WORRYING TOO MUCH ABOUT DIFFERENT THINGS: SEVERAL DAYS
GAD7 TOTAL SCORE: 6
1. FEELING NERVOUS, ANXIOUS, OR ON EDGE: SEVERAL DAYS
8. IF YOU CHECKED OFF ANY PROBLEMS, HOW DIFFICULT HAVE THESE MADE IT FOR YOU TO DO YOUR WORK, TAKE CARE OF THINGS AT HOME, OR GET ALONG WITH OTHER PEOPLE?: SOMEWHAT DIFFICULT

## 2022-08-28 ASSESSMENT — PATIENT HEALTH QUESTIONNAIRE - PHQ9
SUM OF ALL RESPONSES TO PHQ QUESTIONS 1-9: 12
SUM OF ALL RESPONSES TO PHQ QUESTIONS 1-9: 12

## 2022-08-29 ENCOUNTER — VIRTUAL VISIT (OUTPATIENT)
Dept: FAMILY MEDICINE | Facility: CLINIC | Age: 62
End: 2022-08-29
Payer: COMMERCIAL

## 2022-08-29 DIAGNOSIS — F43.21 ADJUSTMENT DISORDER WITH DEPRESSED MOOD: ICD-10-CM

## 2022-08-29 DIAGNOSIS — F90.0 ATTENTION DEFICIT HYPERACTIVITY DISORDER (ADHD), PREDOMINANTLY INATTENTIVE TYPE: Primary | ICD-10-CM

## 2022-08-29 DIAGNOSIS — F32.1 CURRENT MODERATE EPISODE OF MAJOR DEPRESSIVE DISORDER WITHOUT PRIOR EPISODE (H): ICD-10-CM

## 2022-08-29 PROCEDURE — 99213 OFFICE O/P EST LOW 20 MIN: CPT | Mod: 95 | Performed by: FAMILY MEDICINE

## 2022-08-29 ASSESSMENT — PATIENT HEALTH QUESTIONNAIRE - PHQ9
SUM OF ALL RESPONSES TO PHQ QUESTIONS 1-9: 12
10. IF YOU CHECKED OFF ANY PROBLEMS, HOW DIFFICULT HAVE THESE PROBLEMS MADE IT FOR YOU TO DO YOUR WORK, TAKE CARE OF THINGS AT HOME, OR GET ALONG WITH OTHER PEOPLE: NOT DIFFICULT AT ALL

## 2022-08-29 ASSESSMENT — ANXIETY QUESTIONNAIRES: GAD7 TOTAL SCORE: 6

## 2022-08-29 NOTE — PROGRESS NOTES
Segundo is a 62 year old who is being evaluated via a billable video visit.      How would you like to obtain your AVS? MyChart  If the video visit is dropped, the invitation should be resent by: will connect via VOSS  Will anyone else be joining your video visit? No          Segundo was seen today for depression.    Diagnoses and all orders for this visit:    Attention deficit hyperactivity disorder (ADHD), predominantly inattentive type    Current moderate episode of major depressive disorder without prior episode (H)    Adjustment disorder with depressed mood           Subjective   Segundo is a 62 year old has a history of depression and adjustment disorder with depressed mood.  His wife continues to torres cancer.  She is present for the visit today I was able to say hello.    Recently we discussed attention deficit disorder and difficulty with motivation on top of these other symptoms.  Remains on his bupropion and escitalopram but we did recently add Adderall 10 mg extended release.  He feels that not only has he had improvement in attention deficit symptoms including be able to motivate himself and have more energy and he feels his mood is overall improved 2.  He is happy with the effect of medication denies any side effects.  Specifically no increased anxiety, no difficulty with sleep, no significant appetite suppression.  Discussed continuing with medication.  We will plan follow-up in the next 6 to 8 weeks.  He will likely present at that time for a preoperative evaluation for knee surgery.  Me know if there are problems prior.  Would be willing to fill 3 months of medication for him based on the success and the situation overall.        Review of Systems   Complete review of systems is obtained.  Other than the specific considerations noted above complete review of systems is negative.        Objective           Vitals:  No vitals were obtained today due to virtual visit.    Physical Exam   GENERAL:  Healthy, alert and no distress  EYES: Eyes grossly normal to inspection.  No discharge or erythema, or obvious scleral/conjunctival abnormalities.  RESP: No audible wheeze, cough, or visible cyanosis.  No visible retractions or increased work of breathing.    SKIN: Visible skin clear. No significant rash, abnormal pigmentation or lesions.  NEURO: Cranial nerves grossly intact.  Mentation and speech appropriate for age.  PSYCH: Mentation appears normal, affect normal/bright, judgement and insight intact, normal speech and appearance well-groomed.                Video-Visit Details    Video Start Time: 11:57 PM    Type of service:  Video Visit    Video End Time:12:01 PM    Originating Location (pt. Location): Home    Distant Location (provider location):  [facility]     Platform used for Video Visit: [employer].  Answers for HPI/ROS submitted by the patient on 8/28/2022  If you checked off any problems, how difficult have these problems made it for you to do your work, take care of things at home, or get along with other people?: Not difficult at all  PHQ9 TOTAL SCORE: 12  ANTONIO 7 TOTAL SCORE: 6  How many servings of fruits and vegetables do you eat daily?: 2-3  On average, how many sweetened beverages do you drink each day (Examples: soda, juice, sweet tea, etc.  Do NOT count diet or artificially sweetened beverages)?: 3  How many minutes a day do you exercise enough to make your heart beat faster?: 9 or less  How many days a week do you exercise enough to make your heart beat faster?: 3 or less  How many days per week do you miss taking your medication?: 0

## 2022-09-04 DIAGNOSIS — F90.0 ATTENTION-DEFICIT HYPERACTIVITY DISORDER, PREDOMINANTLY INATTENTIVE TYPE: Primary | ICD-10-CM

## 2022-09-04 DIAGNOSIS — F90.0 ATTENTION DEFICIT HYPERACTIVITY DISORDER (ADHD), PREDOMINANTLY INATTENTIVE TYPE: ICD-10-CM

## 2022-09-04 RX ORDER — DEXTROAMPHETAMINE SACCHARATE, AMPHETAMINE ASPARTATE MONOHYDRATE, DEXTROAMPHETAMINE SULFATE AND AMPHETAMINE SULFATE 2.5; 2.5; 2.5; 2.5 MG/1; MG/1; MG/1; MG/1
10 CAPSULE, EXTENDED RELEASE ORAL DAILY
Qty: 30 CAPSULE | Refills: 0 | Status: SHIPPED | OUTPATIENT
Start: 2022-10-05 | End: 2022-11-04

## 2022-09-04 RX ORDER — DEXTROAMPHETAMINE SACCHARATE, AMPHETAMINE ASPARTATE MONOHYDRATE, DEXTROAMPHETAMINE SULFATE AND AMPHETAMINE SULFATE 2.5; 2.5; 2.5; 2.5 MG/1; MG/1; MG/1; MG/1
10 CAPSULE, EXTENDED RELEASE ORAL DAILY
Qty: 30 CAPSULE | Refills: 0 | Status: SHIPPED | OUTPATIENT
Start: 2022-11-05 | End: 2022-11-28 | Stop reason: DRUGHIGH

## 2022-09-04 RX ORDER — DEXTROAMPHETAMINE SACCHARATE, AMPHETAMINE ASPARTATE MONOHYDRATE, DEXTROAMPHETAMINE SULFATE AND AMPHETAMINE SULFATE 2.5; 2.5; 2.5; 2.5 MG/1; MG/1; MG/1; MG/1
10 CAPSULE, EXTENDED RELEASE ORAL DAILY
Qty: 30 CAPSULE | Refills: 0 | OUTPATIENT
Start: 2022-09-04

## 2022-09-04 RX ORDER — DEXTROAMPHETAMINE SACCHARATE, AMPHETAMINE ASPARTATE MONOHYDRATE, DEXTROAMPHETAMINE SULFATE AND AMPHETAMINE SULFATE 2.5; 2.5; 2.5; 2.5 MG/1; MG/1; MG/1; MG/1
10 CAPSULE, EXTENDED RELEASE ORAL DAILY
Qty: 30 CAPSULE | Refills: 0 | Status: SHIPPED | OUTPATIENT
Start: 2022-09-04 | End: 2022-10-04

## 2022-10-01 ENCOUNTER — HEALTH MAINTENANCE LETTER (OUTPATIENT)
Age: 62
End: 2022-10-01

## 2022-11-01 ENCOUNTER — ALLIED HEALTH/NURSE VISIT (OUTPATIENT)
Dept: FAMILY MEDICINE | Facility: CLINIC | Age: 62
End: 2022-11-01
Payer: COMMERCIAL

## 2022-11-01 DIAGNOSIS — Z23 NEED FOR VACCINATION: Primary | ICD-10-CM

## 2022-11-01 PROCEDURE — 99207 PR NO CHARGE NURSE ONLY: CPT

## 2022-11-03 DIAGNOSIS — F32.1 CURRENT MODERATE EPISODE OF MAJOR DEPRESSIVE DISORDER WITHOUT PRIOR EPISODE (H): ICD-10-CM

## 2022-11-04 RX ORDER — ESCITALOPRAM OXALATE 20 MG/1
TABLET ORAL
Qty: 90 TABLET | Refills: 2 | Status: SHIPPED | OUTPATIENT
Start: 2022-11-04 | End: 2023-01-12

## 2022-11-04 NOTE — TELEPHONE ENCOUNTER
"Routing refill request to provider for review/approval because:  phq 9    Last Written Prescription Date:  11/1/21  Last Fill Quantity: 90,  # refills: 3   Last office visit provider:  8/29/22     Requested Prescriptions   Pending Prescriptions Disp Refills     escitalopram (LEXAPRO) 20 MG tablet [Pharmacy Med Name: ESCITALOPRAM 20MG] 90 tablet 2     Sig: TAKE 1 TABLET (20 MG TOTAL) BY MOUTH DAILY.       SSRIs Protocol Failed - 11/3/2022 10:00 AM        Failed - PHQ-9 score less than 5 in past 6 months     Please review last PHQ-9 score.           Passed - Medication is active on med list        Passed - Patient is age 18 or older        Passed - Recent (6 mo) or future (30 days) visit within the authorizing provider's specialty     Patient had office visit in the last 6 months or has a visit in the next 30 days with authorizing provider or within the authorizing provider's specialty.  See \"Patient Info\" tab in inbasket, or \"Choose Columns\" in Meds & Orders section of the refill encounter.                 Luh Sanon RN 11/04/22 10:08 AM  "

## 2022-11-21 ENCOUNTER — TRANSFERRED RECORDS (OUTPATIENT)
Dept: HEALTH INFORMATION MANAGEMENT | Facility: CLINIC | Age: 62
End: 2022-11-21

## 2022-11-22 DIAGNOSIS — F90.0 ATTENTION DEFICIT HYPERACTIVITY DISORDER (ADHD), PREDOMINANTLY INATTENTIVE TYPE: ICD-10-CM

## 2022-11-22 DIAGNOSIS — F41.9 ANXIETY: Primary | ICD-10-CM

## 2022-11-22 RX ORDER — LORAZEPAM 0.5 MG/1
0.5 TABLET ORAL EVERY 6 HOURS PRN
Qty: 10 TABLET | Refills: 0 | Status: SHIPPED | OUTPATIENT
Start: 2022-11-22 | End: 2023-03-03

## 2022-11-22 RX ORDER — DEXTROAMPHETAMINE SACCHARATE, AMPHETAMINE ASPARTATE MONOHYDRATE, DEXTROAMPHETAMINE SULFATE AND AMPHETAMINE SULFATE 5; 5; 5; 5 MG/1; MG/1; MG/1; MG/1
20 CAPSULE, EXTENDED RELEASE ORAL DAILY
Qty: 30 CAPSULE | Refills: 0 | Status: SHIPPED | OUTPATIENT
Start: 2022-11-22 | End: 2022-11-28

## 2022-11-25 RX ORDER — FLUOCINONIDE TOPICAL SOLUTION USP, 0.05% 0.5 MG/ML
SOLUTION TOPICAL
Status: ON HOLD | COMMUNITY
Start: 2022-11-08 | End: 2023-08-02

## 2022-11-25 RX ORDER — TRIAMCINOLONE ACETONIDE 1 MG/G
CREAM TOPICAL
Status: ON HOLD | COMMUNITY
Start: 2022-11-09 | End: 2023-08-02

## 2022-11-28 ENCOUNTER — OFFICE VISIT (OUTPATIENT)
Dept: FAMILY MEDICINE | Facility: CLINIC | Age: 62
End: 2022-11-28
Payer: COMMERCIAL

## 2022-11-28 VITALS
SYSTOLIC BLOOD PRESSURE: 138 MMHG | BODY MASS INDEX: 28.76 KG/M2 | HEIGHT: 73 IN | RESPIRATION RATE: 18 BRPM | WEIGHT: 217 LBS | HEART RATE: 75 BPM | OXYGEN SATURATION: 98 % | DIASTOLIC BLOOD PRESSURE: 82 MMHG

## 2022-11-28 DIAGNOSIS — F90.0 ATTENTION DEFICIT HYPERACTIVITY DISORDER (ADHD), PREDOMINANTLY INATTENTIVE TYPE: Primary | ICD-10-CM

## 2022-11-28 DIAGNOSIS — F43.21 ADJUSTMENT DISORDER WITH DEPRESSED MOOD: ICD-10-CM

## 2022-11-28 DIAGNOSIS — G47.00 INSOMNIA, UNSPECIFIED TYPE: ICD-10-CM

## 2022-11-28 DIAGNOSIS — N52.9 ERECTILE DYSFUNCTION, UNSPECIFIED ERECTILE DYSFUNCTION TYPE: ICD-10-CM

## 2022-11-28 PROCEDURE — 99213 OFFICE O/P EST LOW 20 MIN: CPT | Performed by: FAMILY MEDICINE

## 2022-11-28 RX ORDER — DEXTROAMPHETAMINE SACCHARATE, AMPHETAMINE ASPARTATE MONOHYDRATE, DEXTROAMPHETAMINE SULFATE AND AMPHETAMINE SULFATE 7.5; 7.5; 7.5; 7.5 MG/1; MG/1; MG/1; MG/1
30 CAPSULE, EXTENDED RELEASE ORAL DAILY
Qty: 30 CAPSULE | Refills: 0
Start: 2022-11-28 | End: 2022-12-05

## 2022-11-28 RX ORDER — SILDENAFIL 50 MG/1
50 TABLET, FILM COATED ORAL DAILY PRN
Qty: 30 TABLET | Refills: 4 | Status: SHIPPED | OUTPATIENT
Start: 2022-11-28 | End: 2023-07-26

## 2022-11-28 RX ORDER — ZOLPIDEM TARTRATE 5 MG/1
5 TABLET ORAL
Qty: 30 TABLET | Refills: 0 | Status: SHIPPED | OUTPATIENT
Start: 2022-11-28 | End: 2022-12-23

## 2022-11-28 ASSESSMENT — PAIN SCALES - GENERAL: PAINLEVEL: NO PAIN (0)

## 2022-11-28 NOTE — PROGRESS NOTES
"Segundo Sellers  /82   Pulse 75   Resp 18   Ht 1.842 m (6' 0.5\")   Wt 98.4 kg (217 lb)   SpO2 98%   BMI 29.03 kg/m       Assessment/Plan:                Segundo was seen today for recheck medication.    Diagnoses and all orders for this visit:    Attention deficit hyperactivity disorder (ADHD), predominantly inattentive type  -     amphetamine-dextroamphetamine (ADDERALL XR) 30 MG 24 hr capsule; Take 1 capsule (30 mg) by mouth daily    Adjustment disorder with depressed mood  -     amphetamine-dextroamphetamine (ADDERALL XR) 30 MG 24 hr capsule; Take 1 capsule (30 mg) by mouth daily    Erectile dysfunction, unspecified erectile dysfunction type  -     sildenafil (VIAGRA) 50 MG tablet; Take 1 tablet (50 mg) by mouth daily as needed (ED)    Insomnia, unspecified type  -     zolpidem (AMBIEN) 5 MG tablet; Take 1 tablet (5 mg) by mouth nightly as needed for sleep         DISCUSSION  See discussion below.  Subjective:     HPI:    Segundo Sellers is a 62 year old male is here today to discuss several concerns.  His wife recently  after a very long torres with leiomyosarcoma.  He is appropriately grieving.  States if he keeps busy he is doing well.  He has attention deficit disorder.  He has been stimulant therapy.  Recently increased dose up from 10 mg to 20 mg.  Feels he would benefit from slight increase dose further we have discussed increasing up to 30.  He has a few 10 mg tablets remaining and he will take this along with his recently filled 20 mg extended release tablets to achieve a total daily dose of 30 mg.    He is having difficulty with sleep discussed use of Ambien and use of lorazepam was not effective.  He will cease further use of lorazepam.      Discussed management of erectile dysfunction using sildenafil.    ROS:  Complete review of systems is obtained.  Other than the specific considerations noted above complete review of systems is negative.          Objective: "   Medications:  Current Outpatient Medications   Medication     amphetamine-dextroamphetamine (ADDERALL XR) 30 MG 24 hr capsule     buPROPion (WELLBUTRIN XL) 150 MG 24 hr tablet     escitalopram (LEXAPRO) 20 MG tablet     fluocinonide (LIDEX) 0.05 % external solution     LORazepam (ATIVAN) 0.5 MG tablet     sildenafil (VIAGRA) 50 MG tablet     triamcinolone (KENALOG) 0.1 % external cream     zolpidem (AMBIEN) 5 MG tablet     albuterol (PROAIR HFA) 90 mcg/actuation inhaler     montelukast (SINGULAIR) 10 mg tablet     naproxen sodium (ALEVE) 220 MG tablet     tamsulosin (FLOMAX) 0.4 mg cap     No current facility-administered medications for this visit.        Allergies:   No Known Allergies     Social History     Socioeconomic History     Marital status:      Spouse name: Not on file     Number of children: Not on file     Years of education: Not on file     Highest education level: Not on file   Occupational History     Not on file   Tobacco Use     Smoking status: Never     Smokeless tobacco: Current     Types: Chew   Vaping Use     Vaping Use: Never used   Substance and Sexual Activity     Alcohol use: Yes     Alcohol/week: 2.0 standard drinks     Drug use: No     Sexual activity: Yes     Partners: Female     Comment:    Other Topics Concern     Not on file   Social History Narrative     Not on file     Social Determinants of Health     Financial Resource Strain: Not on file   Food Insecurity: Not on file   Transportation Needs: Not on file   Physical Activity: Not on file   Stress: Not on file   Social Connections: Not on file   Intimate Partner Violence: Not on file   Housing Stability: Not on file       Family History   Problem Relation Age of Onset     No Known Problems Mother      Hypertension Father         Most Recent Immunizations   Administered Date(s) Administered     COVID-19 Vaccine 12+ (Pfizer 2022) 04/13/2022     COVID-19 Vaccine 12+ (Pfizer) 11/01/2021     Flu, Unspecified 03/02/2012  "    Influenza (IIV3) PF 12/17/2014     Influenza Vaccine 50-64 or 18-64 w/egg allergy (Flublok) 11/01/2021     Influenza Vaccine >6 months (Alfuria,Fluzone) 11/23/2020     Pneumococcal 23 valent 03/02/2012     TD (ADULT, 7+) 02/20/2020     Tdap (Adacel,Boostrix) 04/02/2019     Tdap (Adult) Unspecified Formulation 01/23/2009        Wt Readings from Last 3 Encounters:   11/28/22 98.4 kg (217 lb)   08/15/22 99.7 kg (219 lb 14.4 oz)   11/01/21 103.4 kg (228 lb)        BP Readings from Last 6 Encounters:   11/28/22 138/82   08/15/22 130/76   11/01/21 126/76   03/16/20 112/70   02/20/20 (!) 124/90        Hemoglobin A1C   Date Value Ref Range Status   02/20/2020 6.0 3.5 - 6.0 % Final   03/02/2012 5.7 3.5 - 6.0 % Final              PHYSICAL EXAM:    /82   Pulse 75   Resp 18   Ht 1.842 m (6' 0.5\")   Wt 98.4 kg (217 lb)   SpO2 98%   BMI 29.03 kg/m           General Appearance:    Alert, cooperative, no distress                                       "

## 2022-12-05 ENCOUNTER — MYC MEDICAL ADVICE (OUTPATIENT)
Dept: FAMILY MEDICINE | Facility: CLINIC | Age: 62
End: 2022-12-05

## 2022-12-05 DIAGNOSIS — F90.0 ATTENTION-DEFICIT HYPERACTIVITY DISORDER, PREDOMINANTLY INATTENTIVE TYPE: ICD-10-CM

## 2022-12-05 RX ORDER — DEXTROAMPHETAMINE SACCHARATE, AMPHETAMINE ASPARTATE MONOHYDRATE, DEXTROAMPHETAMINE SULFATE AND AMPHETAMINE SULFATE 2.5; 2.5; 2.5; 2.5 MG/1; MG/1; MG/1; MG/1
10 CAPSULE, EXTENDED RELEASE ORAL DAILY
Qty: 30 CAPSULE | Refills: 0 | Status: SHIPPED | OUTPATIENT
Start: 2022-12-05 | End: 2022-12-23

## 2022-12-23 DIAGNOSIS — F90.0 ATTENTION-DEFICIT HYPERACTIVITY DISORDER, PREDOMINANTLY INATTENTIVE TYPE: ICD-10-CM

## 2022-12-23 DIAGNOSIS — G47.00 INSOMNIA, UNSPECIFIED TYPE: ICD-10-CM

## 2022-12-23 DIAGNOSIS — F32.1 CURRENT MODERATE EPISODE OF MAJOR DEPRESSIVE DISORDER WITHOUT PRIOR EPISODE (H): ICD-10-CM

## 2022-12-23 RX ORDER — DEXTROAMPHETAMINE SACCHARATE, AMPHETAMINE ASPARTATE MONOHYDRATE, DEXTROAMPHETAMINE SULFATE AND AMPHETAMINE SULFATE 2.5; 2.5; 2.5; 2.5 MG/1; MG/1; MG/1; MG/1
10 CAPSULE, EXTENDED RELEASE ORAL DAILY
Qty: 30 CAPSULE | Refills: 0 | Status: SHIPPED | OUTPATIENT
Start: 2022-12-23 | End: 2023-01-12

## 2022-12-23 RX ORDER — ZOLPIDEM TARTRATE 5 MG/1
5 TABLET ORAL
Qty: 30 TABLET | Refills: 0 | Status: SHIPPED | OUTPATIENT
Start: 2022-12-23 | End: 2023-01-12

## 2022-12-23 RX ORDER — BUPROPION HYDROCHLORIDE 150 MG/1
150 TABLET ORAL EVERY MORNING
Qty: 30 TABLET | Refills: 10 | Status: SHIPPED | OUTPATIENT
Start: 2022-12-23 | End: 2023-01-12

## 2023-01-11 ENCOUNTER — MYC MEDICAL ADVICE (OUTPATIENT)
Dept: FAMILY MEDICINE | Facility: CLINIC | Age: 63
End: 2023-01-11

## 2023-01-11 DIAGNOSIS — F90.0 ATTENTION-DEFICIT HYPERACTIVITY DISORDER, PREDOMINANTLY INATTENTIVE TYPE: ICD-10-CM

## 2023-01-11 DIAGNOSIS — F32.1 CURRENT MODERATE EPISODE OF MAJOR DEPRESSIVE DISORDER WITHOUT PRIOR EPISODE (H): ICD-10-CM

## 2023-01-11 DIAGNOSIS — G47.00 INSOMNIA, UNSPECIFIED TYPE: ICD-10-CM

## 2023-01-12 RX ORDER — ESCITALOPRAM OXALATE 20 MG/1
20 TABLET ORAL DAILY
Qty: 90 TABLET | Refills: 2 | Status: ON HOLD | OUTPATIENT
Start: 2023-01-12 | End: 2023-08-02

## 2023-01-12 RX ORDER — BUPROPION HYDROCHLORIDE 150 MG/1
150 TABLET ORAL EVERY MORNING
Qty: 30 TABLET | Refills: 10 | Status: SHIPPED | OUTPATIENT
Start: 2023-01-12 | End: 2023-09-28

## 2023-01-12 RX ORDER — DEXTROAMPHETAMINE SACCHARATE, AMPHETAMINE ASPARTATE MONOHYDRATE, DEXTROAMPHETAMINE SULFATE AND AMPHETAMINE SULFATE 2.5; 2.5; 2.5; 2.5 MG/1; MG/1; MG/1; MG/1
10 CAPSULE, EXTENDED RELEASE ORAL DAILY
Qty: 30 CAPSULE | Refills: 0 | Status: SHIPPED | OUTPATIENT
Start: 2023-01-12 | End: 2023-02-24

## 2023-01-12 RX ORDER — ZOLPIDEM TARTRATE 5 MG/1
5 TABLET ORAL
Qty: 30 TABLET | Refills: 0 | Status: SHIPPED | OUTPATIENT
Start: 2023-01-12 | End: 2023-02-26

## 2023-02-24 DIAGNOSIS — N52.9 ERECTILE DYSFUNCTION, UNSPECIFIED ERECTILE DYSFUNCTION TYPE: Primary | ICD-10-CM

## 2023-02-24 DIAGNOSIS — F90.0 ATTENTION-DEFICIT HYPERACTIVITY DISORDER, PREDOMINANTLY INATTENTIVE TYPE: ICD-10-CM

## 2023-02-24 RX ORDER — DEXTROAMPHETAMINE SACCHARATE, AMPHETAMINE ASPARTATE MONOHYDRATE, DEXTROAMPHETAMINE SULFATE AND AMPHETAMINE SULFATE 2.5; 2.5; 2.5; 2.5 MG/1; MG/1; MG/1; MG/1
10 CAPSULE, EXTENDED RELEASE ORAL DAILY
Qty: 30 CAPSULE | Refills: 0 | Status: SHIPPED | OUTPATIENT
Start: 2023-02-24 | End: 2023-04-10

## 2023-02-24 ASSESSMENT — ASTHMA QUESTIONNAIRES
ACT_TOTALSCORE: 24
QUESTION_4 LAST FOUR WEEKS HOW OFTEN HAVE YOU USED YOUR RESCUE INHALER OR NEBULIZER MEDICATION (SUCH AS ALBUTEROL): NOT AT ALL
QUESTION_3 LAST FOUR WEEKS HOW OFTEN DID YOUR ASTHMA SYMPTOMS (WHEEZING, COUGHING, SHORTNESS OF BREATH, CHEST TIGHTNESS OR PAIN) WAKE YOU UP AT NIGHT OR EARLIER THAN USUAL IN THE MORNING: NOT AT ALL
QUESTION_1 LAST FOUR WEEKS HOW MUCH OF THE TIME DID YOUR ASTHMA KEEP YOU FROM GETTING AS MUCH DONE AT WORK, SCHOOL OR AT HOME: NONE OF THE TIME
QUESTION_2 LAST FOUR WEEKS HOW OFTEN HAVE YOU HAD SHORTNESS OF BREATH: NOT AT ALL
QUESTION_5 LAST FOUR WEEKS HOW WOULD YOU RATE YOUR ASTHMA CONTROL: WELL CONTROLLED
ACT_TOTALSCORE: 24

## 2023-02-25 DIAGNOSIS — G47.00 INSOMNIA, UNSPECIFIED TYPE: ICD-10-CM

## 2023-02-26 RX ORDER — TAMSULOSIN HYDROCHLORIDE 0.4 MG/1
CAPSULE ORAL
Qty: 180 CAPSULE | Refills: 1 | Status: ON HOLD | OUTPATIENT
Start: 2023-02-26 | End: 2023-08-02

## 2023-02-26 RX ORDER — ZOLPIDEM TARTRATE 5 MG/1
5 TABLET ORAL
Qty: 30 TABLET | Refills: 0 | Status: SHIPPED | OUTPATIENT
Start: 2023-02-26 | End: 2023-03-31

## 2023-02-26 NOTE — TELEPHONE ENCOUNTER
"Routing refill request to provider for review/approval because:  A break in medication        Last Written Prescription Date:  3/16/2020  Last Fill Quantity: ?,  # refills: ?   Last office visit provider:  11/18/2022     Requested Prescriptions   Pending Prescriptions Disp Refills     tamsulosin (FLOMAX) 0.4 MG capsule [Pharmacy Med Name: *TAMSULOSIN 0.4MG] 180 capsule 1     Sig: TAKE 2 CAPSULE(S) EVERY DAY BY ORAL ROUTE.       Alpha Blockers Failed - 2/26/2023  2:48 AM        Failed - Patient does not have Tadalafil, Vardenafil, or Sildenafil on their medication list        Passed - Blood pressure under 140/90 in past 12 months     BP Readings from Last 3 Encounters:   11/28/22 138/82   08/15/22 130/76   11/01/21 126/76                 Passed - Recent (12 mo) or future (30 days) visit within the authorizing provider's specialty     Patient has had an office visit with the authorizing provider or a provider within the authorizing providers department within the previous 12 mos or has a future within next 30 days. See \"Patient Info\" tab in inbasket, or \"Choose Columns\" in Meds & Orders section of the refill encounter.              Passed - Medication is active on med list        Passed - Patient is 18 years of age or older             Jazmine Eduardo RN 02/26/23 2:48 AM  "

## 2023-03-03 ENCOUNTER — TELEPHONE (OUTPATIENT)
Dept: FAMILY MEDICINE | Facility: CLINIC | Age: 63
End: 2023-03-03

## 2023-03-03 ENCOUNTER — OFFICE VISIT (OUTPATIENT)
Dept: FAMILY MEDICINE | Facility: CLINIC | Age: 63
End: 2023-03-03
Payer: COMMERCIAL

## 2023-03-03 VITALS
TEMPERATURE: 98.2 F | BODY MASS INDEX: 29.06 KG/M2 | WEIGHT: 219.3 LBS | RESPIRATION RATE: 20 BRPM | HEIGHT: 73 IN | SYSTOLIC BLOOD PRESSURE: 122 MMHG | HEART RATE: 63 BPM | OXYGEN SATURATION: 97 % | DIASTOLIC BLOOD PRESSURE: 78 MMHG

## 2023-03-03 DIAGNOSIS — G89.29 OTHER CHRONIC PAIN: ICD-10-CM

## 2023-03-03 DIAGNOSIS — Z01.818 PREOP GENERAL PHYSICAL EXAM: Primary | ICD-10-CM

## 2023-03-03 DIAGNOSIS — M17.12 PRIMARY OSTEOARTHRITIS OF LEFT KNEE: ICD-10-CM

## 2023-03-03 DIAGNOSIS — F32.1 CURRENT MODERATE EPISODE OF MAJOR DEPRESSIVE DISORDER WITHOUT PRIOR EPISODE (H): ICD-10-CM

## 2023-03-03 DIAGNOSIS — F90.0 ATTENTION-DEFICIT HYPERACTIVITY DISORDER, PREDOMINANTLY INATTENTIVE TYPE: ICD-10-CM

## 2023-03-03 DIAGNOSIS — R73.03 PREDIABETES: ICD-10-CM

## 2023-03-03 DIAGNOSIS — M48.061 SPINAL STENOSIS OF LUMBAR REGION WITHOUT NEUROGENIC CLAUDICATION: ICD-10-CM

## 2023-03-03 DIAGNOSIS — G47.00 INSOMNIA, UNSPECIFIED TYPE: ICD-10-CM

## 2023-03-03 LAB
ANION GAP SERPL CALCULATED.3IONS-SCNC: 11 MMOL/L (ref 7–15)
BASOPHILS # BLD AUTO: 0.1 10E3/UL (ref 0–0.2)
BASOPHILS NFR BLD AUTO: 1 %
BUN SERPL-MCNC: 14.9 MG/DL (ref 8–23)
CALCIUM SERPL-MCNC: 9.1 MG/DL (ref 8.8–10.2)
CHLORIDE SERPL-SCNC: 102 MMOL/L (ref 98–107)
CREAT SERPL-MCNC: 1.05 MG/DL (ref 0.67–1.17)
DEPRECATED HCO3 PLAS-SCNC: 23 MMOL/L (ref 22–29)
EOSINOPHIL # BLD AUTO: 0.2 10E3/UL (ref 0–0.7)
EOSINOPHIL NFR BLD AUTO: 3 %
ERYTHROCYTE [DISTWIDTH] IN BLOOD BY AUTOMATED COUNT: 12.8 % (ref 10–15)
GFR SERPL CREATININE-BSD FRML MDRD: 80 ML/MIN/1.73M2
GLUCOSE SERPL-MCNC: 108 MG/DL (ref 70–99)
HBA1C MFR BLD: 5.7 % (ref 0–5.6)
HCT VFR BLD AUTO: 40.8 % (ref 40–53)
HGB BLD-MCNC: 14.3 G/DL (ref 13.3–17.7)
IMM GRANULOCYTES # BLD: 0 10E3/UL
IMM GRANULOCYTES NFR BLD: 0 %
LYMPHOCYTES # BLD AUTO: 1.4 10E3/UL (ref 0.8–5.3)
LYMPHOCYTES NFR BLD AUTO: 24 %
MCH RBC QN AUTO: 31.8 PG (ref 26.5–33)
MCHC RBC AUTO-ENTMCNC: 35 G/DL (ref 31.5–36.5)
MCV RBC AUTO: 91 FL (ref 78–100)
MONOCYTES # BLD AUTO: 0.6 10E3/UL (ref 0–1.3)
MONOCYTES NFR BLD AUTO: 10 %
NEUTROPHILS # BLD AUTO: 3.5 10E3/UL (ref 1.6–8.3)
NEUTROPHILS NFR BLD AUTO: 62 %
PLATELET # BLD AUTO: 247 10E3/UL (ref 150–450)
POTASSIUM SERPL-SCNC: 4.3 MMOL/L (ref 3.4–5.3)
RBC # BLD AUTO: 4.5 10E6/UL (ref 4.4–5.9)
SODIUM SERPL-SCNC: 136 MMOL/L (ref 136–145)
WBC # BLD AUTO: 5.7 10E3/UL (ref 4–11)

## 2023-03-03 PROCEDURE — 83036 HEMOGLOBIN GLYCOSYLATED A1C: CPT | Performed by: FAMILY MEDICINE

## 2023-03-03 PROCEDURE — 85025 COMPLETE CBC W/AUTO DIFF WBC: CPT | Performed by: FAMILY MEDICINE

## 2023-03-03 PROCEDURE — 80048 BASIC METABOLIC PNL TOTAL CA: CPT | Performed by: FAMILY MEDICINE

## 2023-03-03 PROCEDURE — 99214 OFFICE O/P EST MOD 30 MIN: CPT | Performed by: FAMILY MEDICINE

## 2023-03-03 PROCEDURE — 36415 COLL VENOUS BLD VENIPUNCTURE: CPT | Performed by: FAMILY MEDICINE

## 2023-03-03 RX ORDER — OXYCODONE AND ACETAMINOPHEN 5; 325 MG/1; MG/1
1 TABLET ORAL 2 TIMES DAILY PRN
Qty: 20 TABLET | Refills: 0 | Status: SHIPPED | OUTPATIENT
Start: 2023-03-03 | End: 2023-03-06

## 2023-03-03 ASSESSMENT — PATIENT HEALTH QUESTIONNAIRE - PHQ9
SUM OF ALL RESPONSES TO PHQ QUESTIONS 1-9: 14
SUM OF ALL RESPONSES TO PHQ QUESTIONS 1-9: 14
10. IF YOU CHECKED OFF ANY PROBLEMS, HOW DIFFICULT HAVE THESE PROBLEMS MADE IT FOR YOU TO DO YOUR WORK, TAKE CARE OF THINGS AT HOME, OR GET ALONG WITH OTHER PEOPLE: SOMEWHAT DIFFICULT

## 2023-03-03 ASSESSMENT — PAIN SCALES - GENERAL: PAINLEVEL: NO PAIN (0)

## 2023-03-03 NOTE — TELEPHONE ENCOUNTER
Faxed over preop paperwork to [facility] at [phone] for patients upcoming surgery on 3-15-23

## 2023-03-03 NOTE — PROGRESS NOTES
[facility]  [address] [place] [address] AVE [facility] [address]  [place] [address]  Phone: [phone]  Fax: [phone]  Primary Provider: Elder Fulton  Pre-op Performing Provider: ELDER FULTON      PREOPERATIVE EVALUATION:  Today's date: 3/3/2023    Elder Sellers is a 62 year old male who presents for a preoperative evaluation.    Surgical Information:  Surgery/Procedure: left total knee replacement   Surgery Location: [facility]  Surgeon: Dr. Harris  Surgery Date: 3-15-23  Time of Surgery: ?  Where patient plans to recover: At home with family  Fax number for surgical facility: [phone]    Type of Anesthesia Anticipated: to be determined    Assessment & Plan     The proposed surgical procedure is considered INTERMEDIATE risk.    Elder was seen today for pre-op exam.    Diagnoses and all orders for this visit:    Preop general physical exam  -     CBC with Platelets & Differential  -     Basic metabolic panel    Primary osteoarthritis of left knee  -     CBC with Platelets & Differential    Insomnia, unspecified type    Attention-deficit hyperactivity disorder, predominantly inattentive type    Current moderate episode of major depressive disorder without prior episode (H)    Prediabetes  -     Basic metabolic panel  -     Hemoglobin A1c    Spinal stenosis of lumbar region without neurogenic claudication  -     oxyCODONE-acetaminophen (PERCOCET) 5-325 MG tablet; Take 1 tablet by mouth 2 times daily as needed for pain    Other chronic pain  -     oxyCODONE-acetaminophen (PERCOCET) 5-325 MG tablet; Take 1 tablet by mouth 2 times daily as needed for pain        Risks and Recommendations:  The patient has the following additional risks and recommendations for perioperative complications:   - No identified additional risk factors other than previously addressed    Medication Instructions:  Patient is to take all scheduled medications on the day of surgery EXCEPT for modifications  listed below:    RECOMMENDATION:  APPROVAL GIVEN to proceed with proposed procedure, without further diagnostic evaluation.      Subjective     HPI related to upcoming procedure:     He has significant left knee osteoarthritis and has exhausted conservative means for treatment and is now scheduled for knee arthroplasty.    His wife  within this past year.  He continues to have grief.  He has anxiety, depression, insomnia and attention deficit disorder.  His mental health is in a reasonably good state at this point in time.    He has a history of prediabetes based on A1c measurements.  Reports that through his workplace physicals have shown slight elevations in fasting blood sugars.  For this reason we will check A1c at this time.    He has a history of spinal stenosis and spondylolisthesis of the lumbar spine.  He is seen by orthopedic specialist for this as well as his knee osteoarthritis.  Patient reports to me he is struggled with significant pain issues.  He has utilized typical over-the-counter pain relieving regimens including acetaminophen, NSAIDs as well as having undergone treatment including corticosteroid injections and physical therapy.  Due to ongoing significant pain we have elected to utilize additional treatment measures with oxycodone acetaminophen leading into his current surgery.  We did significant discussion today regarding this.  We elected to start 1 to 2 tablets daily for management of more significant severe breakthrough pain that is not otherwise addressed with typical over-the-counter pain control regimens.  We will need longer-term follow-up to decide on further course of action.  He is provided with 20 tablets currently.  We discussed the potential additive nature of this medication in conjunction with using zolpidem.  He understands the risks involved.    He has no prior history of significant problems associated with anesthesia.    He has no history of heart disease his overall  vascular disease risks are low.    Preop Questions 2/24/2023   1. Have you ever had a heart attack or stroke? No   2. Have you ever had surgery on your heart or blood vessels, such as a stent placement, a coronary artery bypass, or surgery on an artery in your head, neck, heart, or legs? No   3. Do you have chest pain with activity? No   4. Do you have a history of  heart failure? No   5. Do you currently have a cold, bronchitis or symptoms of other infection? No   6. Do you have a cough, shortness of breath, or wheezing? No   7. Do you or anyone in your family have previous history of blood clots? No   8. Do you or does anyone in your family have a serious bleeding problem such as prolonged bleeding following surgeries or cuts? No   9. Have you ever had problems with anemia or been told to take iron pills? No   10. Have you had any abnormal blood loss such as black, tarry or bloody stools? No   11. Have you ever had a blood transfusion? No   12. Are you willing to have a blood transfusion if it is medically needed before, during, or after your surgery? Yes   13. Have you or any of your relatives ever had problems with anesthesia? No   14. Do you have sleep apnea, excessive snoring or daytime drowsiness? No   15. Do you have any artifical heart valves or other implanted medical devices like a pacemaker, defibrillator, or continuous glucose monitor? No   16. Do you have artificial joints? No   17. Are you allergic to latex? No       Health Care Directive:  Patient does not have a Health Care Directive or Living Will:     Preoperative Review of :   reviewed - controlled substances reflected in medication list.      Status of Chronic Conditions:  See problem list for active medical problems.  Problems all longstanding and stable, except as noted/documented.  See ROS for pertinent symptoms related to these conditions.      Review of Systems  Complete review of systems is obtained.  Other than the specific  considerations noted above complete review of systems is negative.      Patient Active Problem List    Diagnosis Date Noted     Dyslipidemia 10/29/2021     Priority: Medium     Formatting of this note might be different from the original.  Created by Conversion       Pelvic and perineal pain 04/22/2020     Priority: Medium     Current moderate episode of major depressive disorder without prior episode (H) 03/16/2020     Priority: Medium     Slowing of urinary stream 02/28/2020     Priority: Medium     Abdominal pain 02/28/2020     Priority: Medium     Lower urinary tract symptoms 02/28/2020     Priority: Medium     Asthma      Priority: Medium     Created by Conversion         Allergic Rhinitis      Priority: Medium     Created by Conversion  Replacement Utility updated for latest IMO load         Eczema      Priority: Medium     Created by Conversion         Dyslipidemia      Priority: Medium     Created by Conversion         Benign Prostatic Hypertrophy      Priority: Medium     Created by Conversion         Lethargy      Priority: Medium     Created by Conversion         Impaired Fasting Glucose      Priority: Medium     Created by Conversion          History reviewed. No pertinent past medical history.  Past Surgical History:   Procedure Laterality Date     ARTHROSCOPY KNEE Left      Current Outpatient Medications   Medication Sig Dispense Refill     albuterol (PROAIR HFA) 90 mcg/actuation inhaler [ALBUTEROL (PROAIR HFA) 90 MCG/ACTUATION INHALER] Inhale 1-2 puffs. Every 4-6 hours as needed and as directed.       amphetamine-dextroamphetamine (ADDERALL XR) 10 MG 24 hr capsule TAKE 1 CAPSULE (10 MG) BY MOUTH DAILY 30 capsule 0     buPROPion (WELLBUTRIN XL) 150 MG 24 hr tablet Take 1 tablet (150 mg) by mouth every morning 30 tablet 10     escitalopram (LEXAPRO) 20 MG tablet Take 1 tablet (20 mg) by mouth daily 90 tablet 2     fluocinonide (LIDEX) 0.05 % external solution        montelukast (SINGULAIR) 10 mg tablet  "[MONTELUKAST (SINGULAIR) 10 MG TABLET] Take 10 mg by mouth daily as needed.        naproxen sodium (ALEVE) 220 MG tablet [NAPROXEN SODIUM (ALEVE) 220 MG TABLET] Take 220 mg by mouth 2 (two) times a day with meals.       oxyCODONE-acetaminophen (PERCOCET) 5-325 MG tablet Take 1 tablet by mouth 2 times daily as needed for pain 20 tablet 0     sildenafil (VIAGRA) 50 MG tablet Take 1 tablet (50 mg) by mouth daily as needed (ED) 30 tablet 4     tamsulosin (FLOMAX) 0.4 MG capsule TAKE 2 CAPSULE(S) EVERY DAY BY ORAL ROUTE. 180 capsule 1     triamcinolone (KENALOG) 0.1 % external cream        zolpidem (AMBIEN) 5 MG tablet TAKE 1 TABLET (5 MG) BY MOUTH NIGHTLY AS NEEDED FOR SLEEP 30 tablet 0       No Known Allergies     Social History     Tobacco Use     Smoking status: Never     Smokeless tobacco: Former     Types: Chew   Substance Use Topics     Alcohol use: Yes     Alcohol/week: 2.0 standard drinks     Family History   Problem Relation Age of Onset     No Known Problems Mother      Hypertension Father      History   Drug Use No         Objective     /78 (BP Location: Right arm, Patient Position: Sitting, Cuff Size: Adult Regular)   Pulse 63   Temp 98.2  F (36.8  C) (Temporal)   Resp 20   Ht 1.848 m (6' 0.75\")   Wt 99.5 kg (219 lb 4.8 oz)   SpO2 97%   BMI 29.13 kg/m      Physical Exam    General Appearance:    Alert, cooperative, no distress   Eyes:   No scleral icterus or conjunctival irritation       Ears:    Normal TM's and external ear canals, both ears   Throat:   Lips, mucosa, and tongue normal; teeth and gums normal   Neck:   Supple, symmetrical, trachea midline, no adenopathy;        thyroid:  No enlargement/tenderness/nodules   Lungs:     Clear to auscultation bilaterally, respirations unlabored, no wheezes or crackles   Heart:    Regular rate and rhythm,  No murmur   Abdomen:    Soft, no distention, no tenderness on palpation, no masses, no organomegaly     Extremities:  No edema, no joint swelling " or redness, no evidence of any injuries   Skin:  No concerning skin findings, no suspicious moles, no rashes   Neurologic:  On gross examination there is no motor or sensory deficit.  Patient walks with a normal gait         No results for input(s): HGB, PLT, INR, NA, POTASSIUM, CR, A1C in the last 98366 hours.     Diagnostics:  Labs pending at this time.  Results will be reviewed when available.   No EKG required, no history of coronary heart disease, significant arrhythmia, peripheral arterial disease or other structural heart disease.     No results found for this or any previous visit (from the past 24 hour(s)).    Revised Cardiac Risk Index (RCRI):  The patient has the following serious cardiovascular risks for perioperative complications:   - No serious cardiac risks = 0 points     RCRI Interpretation: 0 points: Class I (very low risk - 0.4% complication rate)           Signed Electronically by: Segundo Dejesus MD, MD  Copy of this evaluation report is provided to requesting physician.      Answers for HPI/ROS submitted by the patient on 3/3/2023  If you checked off any problems, how difficult have these problems made it for you to do your work, take care of things at home, or get along with other people?: Somewhat difficult  PHQ9 TOTAL SCORE: 14

## 2023-03-07 ENCOUNTER — TRANSFERRED RECORDS (OUTPATIENT)
Dept: HEALTH INFORMATION MANAGEMENT | Facility: CLINIC | Age: 63
End: 2023-03-07

## 2023-03-15 ENCOUNTER — TRANSFERRED RECORDS (OUTPATIENT)
Dept: HEALTH INFORMATION MANAGEMENT | Facility: CLINIC | Age: 63
End: 2023-03-15

## 2023-03-24 ENCOUNTER — TRANSFERRED RECORDS (OUTPATIENT)
Dept: HEALTH INFORMATION MANAGEMENT | Facility: CLINIC | Age: 63
End: 2023-03-24

## 2023-03-31 RX ORDER — OXYCODONE HYDROCHLORIDE 5 MG/1
TABLET ORAL
COMMUNITY
Start: 2023-03-17 | End: 2023-04-25

## 2023-04-04 ENCOUNTER — TRANSFERRED RECORDS (OUTPATIENT)
Dept: HEALTH INFORMATION MANAGEMENT | Facility: CLINIC | Age: 63
End: 2023-04-04
Payer: COMMERCIAL

## 2023-04-10 DIAGNOSIS — F90.0 ATTENTION-DEFICIT HYPERACTIVITY DISORDER, PREDOMINANTLY INATTENTIVE TYPE: ICD-10-CM

## 2023-04-10 RX ORDER — DEXTROAMPHETAMINE SACCHARATE, AMPHETAMINE ASPARTATE MONOHYDRATE, DEXTROAMPHETAMINE SULFATE AND AMPHETAMINE SULFATE 2.5; 2.5; 2.5; 2.5 MG/1; MG/1; MG/1; MG/1
10 CAPSULE, EXTENDED RELEASE ORAL DAILY
Qty: 30 CAPSULE | Refills: 0 | Status: SHIPPED | OUTPATIENT
Start: 2023-04-10 | End: 2023-05-14

## 2023-04-11 ENCOUNTER — VIRTUAL VISIT (OUTPATIENT)
Dept: FAMILY MEDICINE | Facility: CLINIC | Age: 63
End: 2023-04-11
Payer: COMMERCIAL

## 2023-04-11 DIAGNOSIS — G47.00 INSOMNIA, UNSPECIFIED TYPE: ICD-10-CM

## 2023-04-11 DIAGNOSIS — F90.0 ATTENTION-DEFICIT HYPERACTIVITY DISORDER, PREDOMINANTLY INATTENTIVE TYPE: Primary | ICD-10-CM

## 2023-04-11 DIAGNOSIS — G89.29 OTHER CHRONIC PAIN: ICD-10-CM

## 2023-04-11 DIAGNOSIS — F32.1 CURRENT MODERATE EPISODE OF MAJOR DEPRESSIVE DISORDER WITHOUT PRIOR EPISODE (H): ICD-10-CM

## 2023-04-11 DIAGNOSIS — M48.061 SPINAL STENOSIS OF LUMBAR REGION WITHOUT NEUROGENIC CLAUDICATION: ICD-10-CM

## 2023-04-11 DIAGNOSIS — M17.12 PRIMARY OSTEOARTHRITIS OF LEFT KNEE: ICD-10-CM

## 2023-04-11 PROCEDURE — 99213 OFFICE O/P EST LOW 20 MIN: CPT | Mod: VID | Performed by: FAMILY MEDICINE

## 2023-04-11 RX ORDER — OXYCODONE HYDROCHLORIDE 5 MG/1
5 TABLET ORAL EVERY 6 HOURS PRN
Qty: 30 TABLET | Refills: 0 | Status: CANCELLED | OUTPATIENT
Start: 2023-04-11

## 2023-04-11 ASSESSMENT — ANXIETY QUESTIONNAIRES
GAD7 TOTAL SCORE: 8
7. FEELING AFRAID AS IF SOMETHING AWFUL MIGHT HAPPEN: NOT AT ALL
IF YOU CHECKED OFF ANY PROBLEMS ON THIS QUESTIONNAIRE, HOW DIFFICULT HAVE THESE PROBLEMS MADE IT FOR YOU TO DO YOUR WORK, TAKE CARE OF THINGS AT HOME, OR GET ALONG WITH OTHER PEOPLE: SOMEWHAT DIFFICULT
3. WORRYING TOO MUCH ABOUT DIFFERENT THINGS: SEVERAL DAYS
GAD7 TOTAL SCORE: 8
2. NOT BEING ABLE TO STOP OR CONTROL WORRYING: SEVERAL DAYS
5. BEING SO RESTLESS THAT IT IS HARD TO SIT STILL: MORE THAN HALF THE DAYS
8. IF YOU CHECKED OFF ANY PROBLEMS, HOW DIFFICULT HAVE THESE MADE IT FOR YOU TO DO YOUR WORK, TAKE CARE OF THINGS AT HOME, OR GET ALONG WITH OTHER PEOPLE?: SOMEWHAT DIFFICULT
7. FEELING AFRAID AS IF SOMETHING AWFUL MIGHT HAPPEN: NOT AT ALL
GAD7 TOTAL SCORE: 8
1. FEELING NERVOUS, ANXIOUS, OR ON EDGE: SEVERAL DAYS
4. TROUBLE RELAXING: SEVERAL DAYS
6. BECOMING EASILY ANNOYED OR IRRITABLE: MORE THAN HALF THE DAYS

## 2023-04-11 ASSESSMENT — PATIENT HEALTH QUESTIONNAIRE - PHQ9
SUM OF ALL RESPONSES TO PHQ QUESTIONS 1-9: 10
SUM OF ALL RESPONSES TO PHQ QUESTIONS 1-9: 10
10. IF YOU CHECKED OFF ANY PROBLEMS, HOW DIFFICULT HAVE THESE PROBLEMS MADE IT FOR YOU TO DO YOUR WORK, TAKE CARE OF THINGS AT HOME, OR GET ALONG WITH OTHER PEOPLE: SOMEWHAT DIFFICULT

## 2023-04-11 NOTE — PROGRESS NOTES
Segundo is a 62 year old who is being evaluated via a billable video visit.      How would you like to obtain your AVS? MyChart  If the video visit is dropped, the invitation should be resent by: Text to cell phone: [phone]  Will anyone else be joining your video visit? No        Segundo was seen today for recheck medication.    Diagnoses and all orders for this visit:    Attention-deficit hyperactivity disorder, predominantly inattentive type    Insomnia, unspecified type    Spinal stenosis of lumbar region without neurogenic claudication    Other chronic pain    Primary osteoarthritis of left knee    Current moderate episode of major depressive disorder without prior episode (H)       Plan will be to have a repeat visit in the relatively near future to transition his pain control management back to primary care away from his orthopedic specialist.  We will continue prescriptions for his ongoing management of attention deficit disorder and mental health concerns.  He has elected to forego continued use of zolpidem which based on the potential additive effects of medications I think is the best approach.    Subjective     HPI     Segundo Sellers is a 62-year-old man who is seen today for several concerns.    His wife  within this past year after a long torres with cancer.  He continues to have grief.  He also suffers from anxiety, depression, insomnia and attention deficit disorder.    He has recently had knee replacement.  He also suffers from chronic back pain secondary to spinal stenosis and has been seen by specialty providers.    Patient reports that his recovering from his knee replacement is going well.  He continues to use pain medication under the direction of his orthopedic specialist.  Prior to his procedure he and I had discussed a strategy for ongoing chronic pain management specifically for his back.  We have elected to defer overall pain management as his knee pain is the biggest source of  pain at this time to his orthopedic specialist who continues to prescribe oxycodone.  Patient reports he is taking approximately 3 tablets of 5 mg of oxycodone daily, he takes 1 in the morning, 1 in the later part of the day and 1 again right before bed.  He is also utilizing Aleve.  Patient reports this is working to control his overall pain for the time being.    We discussed that as his knee pain continues to improve and we shift our focus to controlling his back pain that we will again regroup and I will take over prescribing medication for that purpose specifically.    We discussed management of his attention deficit disorder.  He is on 10 mg of generic Adderall.  This is working well.  Patient reports no significant side effects or difficulties on an ongoing basis.    Reviewed medications used to treat anxiety and depression symptoms.  Reviewed recent PHQ-9 and ANTONIO-7 scores.  Overall from a mental health standpoint he feels he is doing well.            Review of Systems   Complete review of systems is obtained.  Other than the specific considerations noted above complete review of systems is negative.        Objective           Vitals:  No vitals were obtained today due to virtual visit.    Physical Exam   GENERAL: Healthy, alert and no distress  EYES: Eyes grossly normal to inspection.  No discharge or erythema, or obvious scleral/conjunctival abnormalities.  RESP: No audible wheeze, cough, or visible cyanosis.  No visible retractions or increased work of breathing.    SKIN: Visible skin clear. No significant rash, abnormal pigmentation or lesions.  NEURO: Cranial nerves grossly intact.  Mentation and speech appropriate for age.  PSYCH: Mentation appears normal, affect normal/bright, judgement and insight intact, normal speech and appearance well-groomed.                Video-Visit Details    Type of service:  Video Visit     Originating Location (pt. Location): Home  Distant Location (provider location):   On-site  Platform used for Video Visit: Daniel

## 2023-04-24 NOTE — PROGRESS NOTES
Segundo is a 63 year old who is being evaluated via a billable telephone visit.      What phone number would you like to be contacted at? [phone]  How would you like to obtain your AVS? Aquiles    Distant Location (provider location):  On-site    Segundo was seen today for recheck medication and pain.    Diagnoses and all orders for this visit:    Spinal stenosis of lumbar region without neurogenic claudication  -     oxyCODONE (ROXICODONE) 5 MG tablet; Take 1 tablet (5 mg) by mouth every 8 hours    Other chronic pain  -     oxyCODONE (ROXICODONE) 5 MG tablet; Take 1 tablet (5 mg) by mouth every 8 hours    Insomnia, unspecified type           Subjective   Segundo is a 63 year old, presenting for the following health issues:  No chief complaint on file.      His chronic back pain stemming from lumbar stenosis.  He recently underwent knee replacement and is currently doing well.  His pain control was managed by his orthopedic specialty team from the time of his knee replacement up until recently.  He is doing well with management of pain control for his low back with taking oxycodone 5 mg 3 times daily.  We discussed initiating this dosage on an ongoing basis to continue to control his pain.  The PDMP is reviewed noting his prescriptions that were provided through his orthopedic team.  I am aware of his other medications and other health history.  We did discuss the additive effects of using sedated medications along with oxycodone he is well aware of these risks.  We discussed having him follow-up with a visit in 1 month at which time we will obtain urine drug screen and sign controlled substance agreement.          Review of Systems   Complete review of systems is obtained.  Other than the specific considerations noted above complete review of systems is negative.        Objective           Vitals:  No vitals were obtained today due to virtual visit.    Physical Exam   healthy, alert and no distress  PSYCH:  Alert and oriented times 3; coherent speech, normal   rate and volume, able to articulate logical thoughts, able   to abstract reason, no tangential thoughts, no hallucinations   or delusions  His affect is normal  RESP: No cough, no audible wheezing, able to talk in full sentences  Remainder of exam unable to be completed due to telephone visits                Phone call duration: 8 minutes

## 2023-04-25 ENCOUNTER — VIRTUAL VISIT (OUTPATIENT)
Dept: FAMILY MEDICINE | Facility: CLINIC | Age: 63
End: 2023-04-25
Payer: COMMERCIAL

## 2023-04-25 DIAGNOSIS — G89.29 OTHER CHRONIC PAIN: ICD-10-CM

## 2023-04-25 DIAGNOSIS — G47.00 INSOMNIA, UNSPECIFIED TYPE: ICD-10-CM

## 2023-04-25 DIAGNOSIS — M48.061 SPINAL STENOSIS OF LUMBAR REGION WITHOUT NEUROGENIC CLAUDICATION: Primary | ICD-10-CM

## 2023-04-25 PROCEDURE — 99213 OFFICE O/P EST LOW 20 MIN: CPT | Mod: 95 | Performed by: FAMILY MEDICINE

## 2023-04-25 RX ORDER — OXYCODONE HYDROCHLORIDE 5 MG/1
5 TABLET ORAL EVERY 8 HOURS
Qty: 90 TABLET | Refills: 0 | Status: SHIPPED | OUTPATIENT
Start: 2023-04-25 | End: 2023-05-22

## 2023-05-10 ENCOUNTER — TRANSFERRED RECORDS (OUTPATIENT)
Dept: HEALTH INFORMATION MANAGEMENT | Facility: CLINIC | Age: 63
End: 2023-05-10
Payer: COMMERCIAL

## 2023-05-14 DIAGNOSIS — F90.0 ATTENTION-DEFICIT HYPERACTIVITY DISORDER, PREDOMINANTLY INATTENTIVE TYPE: ICD-10-CM

## 2023-05-14 RX ORDER — DEXTROAMPHETAMINE SACCHARATE, AMPHETAMINE ASPARTATE MONOHYDRATE, DEXTROAMPHETAMINE SULFATE AND AMPHETAMINE SULFATE 2.5; 2.5; 2.5; 2.5 MG/1; MG/1; MG/1; MG/1
10 CAPSULE, EXTENDED RELEASE ORAL DAILY
Qty: 30 CAPSULE | Refills: 0 | Status: SHIPPED | OUTPATIENT
Start: 2023-05-14 | End: 2023-06-14

## 2023-05-15 ENCOUNTER — TRANSFERRED RECORDS (OUTPATIENT)
Dept: HEALTH INFORMATION MANAGEMENT | Facility: CLINIC | Age: 63
End: 2023-05-15
Payer: COMMERCIAL

## 2023-05-15 RX ORDER — FLUOROURACIL 50 MG/G
CREAM TOPICAL
Status: ON HOLD | COMMUNITY
Start: 2023-05-10 | End: 2023-08-02

## 2023-05-15 ASSESSMENT — PATIENT HEALTH QUESTIONNAIRE - PHQ9
SUM OF ALL RESPONSES TO PHQ QUESTIONS 1-9: 5
SUM OF ALL RESPONSES TO PHQ QUESTIONS 1-9: 5
10. IF YOU CHECKED OFF ANY PROBLEMS, HOW DIFFICULT HAVE THESE PROBLEMS MADE IT FOR YOU TO DO YOUR WORK, TAKE CARE OF THINGS AT HOME, OR GET ALONG WITH OTHER PEOPLE: NOT DIFFICULT AT ALL

## 2023-05-15 ASSESSMENT — ANXIETY QUESTIONNAIRES
GAD7 TOTAL SCORE: 7
GAD7 TOTAL SCORE: 7
IF YOU CHECKED OFF ANY PROBLEMS ON THIS QUESTIONNAIRE, HOW DIFFICULT HAVE THESE PROBLEMS MADE IT FOR YOU TO DO YOUR WORK, TAKE CARE OF THINGS AT HOME, OR GET ALONG WITH OTHER PEOPLE: NOT DIFFICULT AT ALL
1. FEELING NERVOUS, ANXIOUS, OR ON EDGE: SEVERAL DAYS
7. FEELING AFRAID AS IF SOMETHING AWFUL MIGHT HAPPEN: SEVERAL DAYS
7. FEELING AFRAID AS IF SOMETHING AWFUL MIGHT HAPPEN: SEVERAL DAYS
6. BECOMING EASILY ANNOYED OR IRRITABLE: SEVERAL DAYS
3. WORRYING TOO MUCH ABOUT DIFFERENT THINGS: SEVERAL DAYS
4. TROUBLE RELAXING: SEVERAL DAYS
5. BEING SO RESTLESS THAT IT IS HARD TO SIT STILL: SEVERAL DAYS
8. IF YOU CHECKED OFF ANY PROBLEMS, HOW DIFFICULT HAVE THESE MADE IT FOR YOU TO DO YOUR WORK, TAKE CARE OF THINGS AT HOME, OR GET ALONG WITH OTHER PEOPLE?: NOT DIFFICULT AT ALL
2. NOT BEING ABLE TO STOP OR CONTROL WORRYING: SEVERAL DAYS
GAD7 TOTAL SCORE: 7

## 2023-05-22 ENCOUNTER — OFFICE VISIT (OUTPATIENT)
Dept: FAMILY MEDICINE | Facility: CLINIC | Age: 63
End: 2023-05-22
Payer: COMMERCIAL

## 2023-05-22 VITALS
TEMPERATURE: 98.6 F | DIASTOLIC BLOOD PRESSURE: 80 MMHG | SYSTOLIC BLOOD PRESSURE: 128 MMHG | BODY MASS INDEX: 27.17 KG/M2 | OXYGEN SATURATION: 98 % | HEIGHT: 73 IN | RESPIRATION RATE: 18 BRPM | WEIGHT: 205 LBS | HEART RATE: 60 BPM

## 2023-05-22 DIAGNOSIS — M48.061 SPINAL STENOSIS OF LUMBAR REGION WITHOUT NEUROGENIC CLAUDICATION: Primary | ICD-10-CM

## 2023-05-22 DIAGNOSIS — G47.00 INSOMNIA, UNSPECIFIED TYPE: ICD-10-CM

## 2023-05-22 DIAGNOSIS — F90.0 ATTENTION-DEFICIT HYPERACTIVITY DISORDER, PREDOMINANTLY INATTENTIVE TYPE: ICD-10-CM

## 2023-05-22 DIAGNOSIS — G89.29 OTHER CHRONIC PAIN: ICD-10-CM

## 2023-05-22 LAB
AMPHETAMINES UR QL SCN: ABNORMAL
BARBITURATES UR QL SCN: ABNORMAL
BENZODIAZ UR QL SCN: ABNORMAL
BZE UR QL SCN: ABNORMAL
CANNABINOIDS UR QL SCN: ABNORMAL
CREAT UR-MCNC: 28.3 MG/DL
OPIATES UR QL SCN: ABNORMAL
OXYCODONE UR QL: ABNORMAL
PCP QUAL URINE (ROCHE): ABNORMAL

## 2023-05-22 PROCEDURE — 99214 OFFICE O/P EST MOD 30 MIN: CPT | Performed by: FAMILY MEDICINE

## 2023-05-22 PROCEDURE — 80307 DRUG TEST PRSMV CHEM ANLYZR: CPT | Performed by: FAMILY MEDICINE

## 2023-05-22 RX ORDER — OXYCODONE HYDROCHLORIDE 5 MG/1
5 TABLET ORAL EVERY 8 HOURS
Qty: 90 TABLET | Refills: 0 | Status: SHIPPED | OUTPATIENT
Start: 2023-05-22 | End: 2023-06-19

## 2023-05-22 RX ORDER — ZOLPIDEM TARTRATE 5 MG/1
5 TABLET ORAL
Qty: 30 TABLET | Refills: 1 | Status: SHIPPED | OUTPATIENT
Start: 2023-05-22 | End: 2023-08-14

## 2023-05-22 ASSESSMENT — PAIN SCALES - GENERAL: PAINLEVEL: MILD PAIN (2)

## 2023-05-22 NOTE — PROGRESS NOTES
"Segundo Sellers  /80   Pulse 60   Temp 98.6  F (37  C)   Resp 18   Ht 1.848 m (6' 0.75\")   Wt 93 kg (205 lb)   SpO2 98%   BMI 27.23 kg/m       Assessment/Plan:                Segundo was seen today for recheck.    Diagnoses and all orders for this visit:    Spinal stenosis of lumbar region without neurogenic claudication  -     oxyCODONE (ROXICODONE) 5 MG tablet; Take 1 tablet (5 mg) by mouth every 8 hours    Other chronic pain  -     oxyCODONE (ROXICODONE) 5 MG tablet; Take 1 tablet (5 mg) by mouth every 8 hours  -     Urine Drugs of Abuse Screen Panel 1 - Drug Screen (Full)    Insomnia, unspecified type  -     zolpidem (AMBIEN) 5 MG tablet; Take 1 tablet (5 mg) by mouth nightly as needed for sleep    Attention-deficit hyperactivity disorder, predominantly inattentive type         DISCUSSION  See discussion below.  Continue current medication therapy.  Appropriate refills are sent to the pharmacy after [place] PDMP is checked.  We will see patient again in 3 months.  Subjective:     HPI:    Segundo Sellers is a 63 year old male is here today to discuss management of chronic pain, insomnia and attention deficit disorder.    His wife  this past year after a long torres with cancer.  He continues to grieve her loss but is overall doing well.    He has longstanding attention deficit disorder symptoms he takes stimulant medication currently at 10 mg extended release once daily.  Higher doses have caused unfavorable side effects this dose is effective at managing symptoms without causing concern.  No evidence of misuse or diversion of medication.    He has chronic pain stemming from lumbar spinal stenosis and other chronic back pain issues.  He recently had knee replacement surgery and was maintained on oxycodone 5 mg.  He has recovered from his knee surgery and this is no longer a source of significant pain.  We are managing him on oxycodone 5 mg 1 tablet 3 times daily.  This medication is allowed " for restoration of normal function without causing significant side effects.  There is no evidence of misuse or diversion of medication.  We discussed completing a urine tox screen as well as filling out a controlled substance agreement.  We reviewed the dangers and potential additive dangers from coadministration with other medications of medication treatment with oxycodone.    He does also take zolpidem for sleep.  He is on 5 mg.  Discussed the potential additive effects of oxycodone with this medication he is aware to separate dosing.    ROS:  Complete review of systems is obtained.  Other than the specific considerations noted above complete review of systems is negative.          Objective:   Medications:  Current Outpatient Medications   Medication     albuterol (PROAIR HFA) 90 mcg/actuation inhaler     amphetamine-dextroamphetamine (ADDERALL XR) 10 MG 24 hr capsule     buPROPion (WELLBUTRIN XL) 150 MG 24 hr tablet     escitalopram (LEXAPRO) 20 MG tablet     fluocinonide (LIDEX) 0.05 % external solution     fluorouracil (EFUDEX) 5 % external cream     montelukast (SINGULAIR) 10 mg tablet     naproxen sodium (ALEVE) 220 MG tablet     oxyCODONE (ROXICODONE) 5 MG tablet     sildenafil (VIAGRA) 50 MG tablet     tamsulosin (FLOMAX) 0.4 MG capsule     triamcinolone (KENALOG) 0.1 % external cream     zolpidem (AMBIEN) 5 MG tablet     No current facility-administered medications for this visit.        Allergies:   No Known Allergies     Social History     Socioeconomic History     Marital status:      Spouse name: Not on file     Number of children: Not on file     Years of education: Not on file     Highest education level: Not on file   Occupational History     Not on file   Tobacco Use     Smoking status: Never     Smokeless tobacco: Former     Types: Chew   Vaping Use     Vaping status: Never Used   Substance and Sexual Activity     Alcohol use: Yes     Alcohol/week: 2.0 standard drinks of alcohol     Drug  "use: No     Sexual activity: Yes     Partners: Female     Comment:    Other Topics Concern     Not on file   Social History Narrative     Not on file     Social Determinants of Health     Financial Resource Strain: Not on file   Food Insecurity: Not on file   Transportation Needs: Not on file   Physical Activity: Not on file   Stress: Not on file   Social Connections: Not on file   Intimate Partner Violence: Not on file   Housing Stability: Not on file       Family History   Problem Relation Age of Onset     No Known Problems Mother      Hypertension Father         Most Recent Immunizations   Administered Date(s) Administered     COVID-19 MONOVALENT 12+ (Pfizer) 11/01/2021     COVID-19 Monovalent 12+ (Pfizer 2022) 04/13/2022     Flu, Unspecified 03/02/2012     Influenza (IIV3) PF 12/17/2014     Influenza Vaccine 50-64 or 18-64 w/egg allergy (Flublok) 11/01/2021     Influenza Vaccine >6 months (Alfuria,Fluzone) 11/23/2020     Pneumococcal 23 valent 03/02/2012     TD,PF 7+ (Tenivac) 02/20/2020     TDAP (Adacel,Boostrix) 04/02/2019     Tdap (Adult) Unspecified Formulation 01/23/2009        Wt Readings from Last 3 Encounters:   05/22/23 93 kg (205 lb)   03/03/23 99.5 kg (219 lb 4.8 oz)   11/28/22 98.4 kg (217 lb)        BP Readings from Last 6 Encounters:   05/22/23 128/80   03/03/23 122/78   11/28/22 138/82   08/15/22 130/76   11/01/21 126/76   03/16/20 112/70        Hemoglobin A1C   Date Value Ref Range Status   03/03/2023 5.7 (H) 0.0 - 5.6 % Final     Comment:     Normal <5.7%   Prediabetes 5.7-6.4%    Diabetes 6.5% or higher     Note: Adopted from ADA consensus guidelines.   02/20/2020 6.0 3.5 - 6.0 % Final   03/02/2012 5.7 3.5 - 6.0 % Final        PHYSICAL EXAM:    /80   Pulse 60   Temp 98.6  F (37  C)   Resp 18   Ht 1.848 m (6' 0.75\")   Wt 93 kg (205 lb)   SpO2 98%   BMI 27.23 kg/m           General Appearance:    Alert, cooperative, no distress   Eyes:   No scleral icterus or conjunctival " irritation       Extremities:  No edema, no joint swelling or redness, no evidence of any injuries   Skin:  No concerning skin findings, no suspicious moles, no rashes   Neurologic:  On gross examination there is no motor or sensory deficit.  Patient walks with a normal gait           Answers for HPI/ROS submitted by the patient on 5/15/2023  If you checked off any problems, how difficult have these problems made it for you to do your work, take care of things at home, or get along with other people?: Not difficult at all  PHQ9 TOTAL SCORE: 5  ANTONIO 7 TOTAL SCORE: 7  Depression/Anxiety: Depression & Anxiety  Do you have a cough?: No  Are you experiencing any wheezing in your chest?: No  Do you have any shortness of breath?: No  Use of rescue inhaler:: a few times a week  Taking Asthma medication as prescribed:: 0  Asthma triggers:: animal dander, cold air, pollens, smoke  Have you had any Emergency Room visits, Urgent Care visits, or Hospital Admissions since your last office visit?: No  Status since last visit:: good  Anxiety since last: : good  Other associated symptoms of depression:: No  Other associated symotome: : No  Significant life event: : No  Anxious:: No  Current substance use:: No  Your back pain is: recurring  Where is your back pain located? : right lower back, right buttock  How would you describe your back pain? : burning, sharp, shooting  Where does your back pain spread? : right buttocks, right thigh  Since you noticed your back pain, how has it changed? : always present, but gets better and worse  Does your back pain interfere with your job?: No  How many servings of fruits and vegetables do you eat daily?: 0-1  On average, how many sweetened beverages do you drink each day (Examples: soda, juice, sweet tea, etc.  Do NOT count diet or artificially sweetened beverages)?: 2  How many minutes a day do you exercise enough to make your heart beat faster?: 9 or less  How many days a week do you exercise  enough to make your heart beat faster?: 3 or less  How many days per week do you miss taking your medication?: 0

## 2023-05-22 NOTE — LETTER
Opioid / Opioid Plus Controlled Substance Agreement    This is an agreement between you and your provider about the safe and appropriate use of controlled substance/opioids prescribed by your care team. Controlled substances are medicines that can cause physical and mental dependence (abuse).    There are strict laws about having and using these medicines. We here at [facility] are committing to working with you in your efforts to get better. To support you in this work, we ll help you schedule regular office appointments for medicine refills. If we must cancel or change your appointment for any reason, we ll make sure you have enough medicine to last until your next appointment.     As a Provider, I will:  Listen carefully to your concerns and treat you with respect.   Recommend a treatment plan that I believe is in your best interest. This plan may involve therapies other than opioid pain medication.   Talk with you often about the possible benefits, and the risk of harm of any medicine that we prescribe for you.   Provide a plan on how to taper (discontinue or go off) using this medicine if the decision is made to stop its use.    As a Patient, I understand that opioid(s):   Are a controlled substance prescribed by my care team to help me function or work and manage my condition(s).   Are strong medicines and can cause serious side effects such as:  Drowsiness, which can seriously affect my driving ability  A lower breathing rate, enough to cause death  Harm to my thinking ability   Depression   Abuse of and addiction to this medicine  Need to be taken exactly as prescribed. Combining opioids with certain medicines or chemicals (such as illegal drugs, sedatives, sleeping pills, and benzodiazepines) can be dangerous or even fatal. If I stop opioids suddenly, I may have severe withdrawal symptoms.  Do not work for all types of pain nor for all patients. If they re not helpful, I may be asked to stop  them.        The risks, benefits and side effects of these medicine(s) were explained to me. I agree that:  I will take part in other treatments as advised by my care team. This may be psychiatry or counseling, physical therapy, behavioral therapy, group treatment or a referral to a specialist.     I will keep all my appointments. I understand that this is part of the monitoring of opioids. My care team may require an office visit for EVERY opioid/controlled substance refill. If I miss appointments or don t follow instructions, my care team may stop my medicine.    I will take my medicines as prescribed. I will not change the dose or schedule unless my care team tells me to. There will be no refills if I run out early.     I may be asked to come to the clinic and complete a urine drug test or complete a pill count at any time. If I don t give a urine sample or participate in a pill count, the care team may stop my medicine.    I will only receive prescriptions from this clinic for chronic pain. If I am treated by another provider for acute pain issues, I will tell them that I am taking opioid pain medication for chronic pain and that I have a treatment agreement with this provider. I will inform my [facility] care team within one business day if I am given a prescription for any pain medication by another healthcare provider. My [facility] care team can contact other providers and pharmacists about my use of any medicines.    It is up to me to make sure that I don t run out of my medicines on weekends or holidays. If my care team is willing to refill my opioid prescription without a visit, I must request refills only during office hours. Refills may take up to 3 business days to process. I will use one pharmacy to fill all my opioid and other controlled substance prescriptions. I will notify the clinic about any changes to my insurance or medication availability.    I am responsible for my  prescriptions. If the medicine/prescription is lost, stolen or destroyed, it will not be replaced. I also agree not to share controlled substance medicines with anyone.    I am aware I should not use any illegal or recreational drugs. I agree not to drink alcohol unless my care team says I can.       If I enroll in the [place] Medical Cannabis program, I will tell my care team prior to my next refill.     I will tell my care team right away if I become pregnant, have a new medical problem treated outside of my regular clinic, or have a change in my medications.    I understand that this medicine can affect my thinking, judgment and reaction time. Alcohol and drugs affect the brain and body, which can affect the safety of my driving. Being under the influence of alcohol or drugs can affect my decision-making, behaviors, personal safety, and the safety of others. Driving while impaired (DWI) can occur if a person is driving, operating, or in physical control of a car, motorcycle, boat, snowmobile, ATV, motorbike, off-road vehicle, or any other motor vehicle ([place] Statute 169A.20). I understand the risk if I choose to drive or operate any vehicle or machinery.    I understand that if I do not follow any of the conditions above, my prescriptions or treatment may be stopped or changed.          Opioids  What You Need to Know    What are opioids?   Opioids are pain medicines that must be prescribed by a doctor. They are also known as narcotics.     Examples are:   morphine (MS Contin, Mallorie)  oxycodone (Oxycontin)  oxycodone and acetaminophen (Percocet)  hydrocodone and acetaminophen (Vicodin, Norco)   fentanyl patch (Duragesic)   hydromorphone (Dilaudid)   methadone  codeine (Tylenol #3)     What do opioids do well?   Opioids are best for severe short-term pain such as after a surgery or injury. They may work well for cancer pain. They may help some people with long-lasting (chronic) pain.     What do opioids NOT do  well?   Opioids never get rid of pain entirely, and they don t work well for most patients with chronic pain. Opioids don t reduce swelling, one of the causes of pain.                                    Other ways to manage chronic pain and improve function include:     Treat the health problem that may be causing pain  Anti-inflammation medicines, which reduce swelling and tenderness, such as ibuprofen (Advil, Motrin) or naproxen (Aleve)  Acetaminophen (Tylenol)  Antidepressants and anti-seizure medicines, especially for nerve pain  Topical treatments such as patches or creams  Injections or nerve blocks  Chiropractic or osteopathic treatment  Acupuncture, massage, deep breathing, meditation, visual imagery, aromatherapy  Use heat or ice at the pain site  Physical therapy   Exercise  Stop smoking  Take part in therapy       Risks and side effects     Talk to your doctor before you start or decide to keep taking opioids. Possible side effects include:    Lowering your breathing rate enough to cause death  Overdose, including death, especially if taking higher than prescribed doses  Worse depression symptoms; less pleasure in things you usually enjoy  Feeling tired or sluggish  Slower thoughts or cloudy thinking  Being more sensitive to pain over time; pain is harder to control  Trouble sleeping or restless sleep  Changes in hormone levels (for example, less testosterone)  Changes in sex drive or ability to have sex  Constipation  Unsafe driving  Itching and sweating  Dizziness  Nausea, throwing up and dry mouth    What else should I know about opioids?    Opioids may lead to dependence, tolerance, or addiction.    Dependence means that if you stop or reduce the medicine too quickly, you will have withdrawal symptoms. These include loose poop (diarrhea), jitters, flu-like symptoms, nervousness and tremors. Dependence is not the same as addiction.                     Tolerance means needing higher doses over time to  get the same effect. This may increase the chance of serious side effects.    Addiction is when people improperly use a substance that harms their body, their mind or their relations with others. Use of opiates can cause a relapse of addiction if you have a history of drug or alcohol abuse.    People who have used opioids for a long time may have a lower quality of life, worse depression, higher levels of pain and more visits to doctors.    You can overdose on opioids. Take these steps to lower your risk of overdose:    Recognize the signs:  Signs of overdose include decrease or loss of consciousness (blackout), slowed breathing, trouble waking up and blue lips. If someone is worried about overdose, they should call 911.    Talk to your doctor about Narcan (naloxone).   If you are at risk for overdose, you may be given a prescription for Narcan. This medicine very quickly reverses the effects of opioids.   If you overdose, a friend or family member can give you Narcan while waiting for the ambulance. They need to know the signs of overdose and how to give Narcan.     Don't use alcohol or street drugs.   Taking them with opioids can cause death.    Do not take any of these medicines unless your doctor says it s OK. Taking these with opioids can cause death:  Benzodiazepines, such as lorazepam (Ativan), alprazolam (Xanax) or diazepam (Valium)  Muscle relaxers, such as cyclobenzaprine (Flexeril)  Sleeping pills like zolpidem (Ambien)   Other opioids      How to keep you and other people safe while taking opioids:    Never share your opioids with others.  Opioid medicines are regulated by the Drug Enforcement Agency ([employer]). Selling or sharing medications is a criminal act.    2. Be sure to store opioids in a secure place, locked up if possible. Young children can easily swallow them and overdose.    3. When you are traveling with your medicines, keep them in the original bottles. If you use a pill box, be sure you also  carry a copy of your medicine list from your clinic or pharmacy.    4. Safe disposal of opioids    Most pharmacies have places to get rid of medicine, called disposal kiosks. Medicine disposal options are also available in every [facility]. Search your county and  medication disposal  to find more options. You can find more details at:  https://www.pca.[facility].[place]./living-green/managing-unwanted-medications     I agree that my provider, clinic care team, and pharmacy may work with any city, state or federal law enforcement agency that investigates the misuse, sale, or other diversion of my controlled medicine. I will allow my provider to discuss my care with, or share a copy of, this agreement with any other treating provider, pharmacy or emergency room where I receive care.    I have read this agreement and have asked questions about anything I did not understand.    _______________________________________________________  Patient Signature - Segundo Sellers _____________________                   Date     _______________________________________________________  Provider Signature - Segundo Dejesus MD, MD   _____________________                   Date     _______________________________________________________  Witness Signature (required if provider not present while patient signing)   _____________________                   Date

## 2023-06-04 ENCOUNTER — HEALTH MAINTENANCE LETTER (OUTPATIENT)
Age: 63
End: 2023-06-04

## 2023-06-14 DIAGNOSIS — F90.0 ATTENTION-DEFICIT HYPERACTIVITY DISORDER, PREDOMINANTLY INATTENTIVE TYPE: ICD-10-CM

## 2023-06-14 RX ORDER — DEXTROAMPHETAMINE SACCHARATE, AMPHETAMINE ASPARTATE MONOHYDRATE, DEXTROAMPHETAMINE SULFATE AND AMPHETAMINE SULFATE 2.5; 2.5; 2.5; 2.5 MG/1; MG/1; MG/1; MG/1
10 CAPSULE, EXTENDED RELEASE ORAL DAILY
Qty: 30 CAPSULE | Refills: 0 | Status: SHIPPED | OUTPATIENT
Start: 2023-06-14 | End: 2023-07-12

## 2023-06-19 ENCOUNTER — OFFICE VISIT (OUTPATIENT)
Dept: FAMILY MEDICINE | Facility: CLINIC | Age: 63
End: 2023-06-19
Payer: COMMERCIAL

## 2023-06-19 VITALS
WEIGHT: 209 LBS | RESPIRATION RATE: 18 BRPM | HEART RATE: 64 BPM | HEIGHT: 73 IN | BODY MASS INDEX: 27.7 KG/M2 | OXYGEN SATURATION: 98 % | SYSTOLIC BLOOD PRESSURE: 136 MMHG | TEMPERATURE: 98.6 F | DIASTOLIC BLOOD PRESSURE: 76 MMHG

## 2023-06-19 DIAGNOSIS — G89.29 OTHER CHRONIC PAIN: ICD-10-CM

## 2023-06-19 DIAGNOSIS — M48.061 SPINAL STENOSIS OF LUMBAR REGION WITHOUT NEUROGENIC CLAUDICATION: ICD-10-CM

## 2023-06-19 DIAGNOSIS — R10.31 RIGHT GROIN PAIN: Primary | ICD-10-CM

## 2023-06-19 PROCEDURE — 99213 OFFICE O/P EST LOW 20 MIN: CPT | Performed by: FAMILY MEDICINE

## 2023-06-19 RX ORDER — OXYCODONE HYDROCHLORIDE 5 MG/1
5 TABLET ORAL EVERY 8 HOURS
Qty: 90 TABLET | Refills: 0 | Status: SHIPPED | OUTPATIENT
Start: 2023-06-19 | End: 2023-07-23

## 2023-06-19 ASSESSMENT — PAIN SCALES - GENERAL: PAINLEVEL: MODERATE PAIN (4)

## 2023-06-19 NOTE — PROGRESS NOTES
"Segundo Sellers  /76   Pulse 64   Temp 98.6  F (37  C)   Resp 18   Ht 1.848 m (6' 0.75\")   Wt 94.8 kg (209 lb)   SpO2 98%   BMI 27.76 kg/m       Assessment/Plan:                Segundo was seen today for hernia.    Diagnoses and all orders for this visit:    Right groin pain  -     US Abdomen or Pelvis Doppler Limited; Future    Spinal stenosis of lumbar region without neurogenic claudication  -     oxyCODONE (ROXICODONE) 5 MG tablet; Take 1 tablet (5 mg) by mouth every 8 hours    Other chronic pain  -     oxyCODONE (ROXICODONE) 5 MG tablet; Take 1 tablet (5 mg) by mouth every 8 hours         DISCUSSION  Pain symptoms in the right groin area may be muscle strain versus small hernia that I cannot feel at this point in time.  Discussed options including watchful waiting, ultrasound or referral elected to proceed with an ultrasound as a neck step.  Continue to monitor closely report any changes or new concerns to me as we await additional information.  Continue other medications see additional discussion below.  Subjective:     HPI:    Segundo Sellers is a 63 year old male has chronic pain secondary to osteoarthritis in the extremities and low back pain secondary to multiple previously described structural considerations.  He is doing well on his current management.  He has attention deficit disorder he is taking stimulant medication.  [place] PDMP is reviewed no evidence of any misuse of medication.  Urine tox screen obtained at last visit was appropriate for medications being taken.      He is here today reporting that he has had right groin pain for a couple of weeks.  Patient reports he has not noticed a bulge but has discomfort gets worse with doing things such as Valsalva he describes blowing his nose straining lifting tend to cause discomfort in the right side groin area.  No history of any prior hernia.    ROS:  Complete review of systems is obtained.  Other than the specific considerations " noted above complete review of systems is negative.      Objective:   Medications:  Current Outpatient Medications   Medication     albuterol (PROAIR HFA) 90 mcg/actuation inhaler     amphetamine-dextroamphetamine (ADDERALL XR) 10 MG 24 hr capsule     buPROPion (WELLBUTRIN XL) 150 MG 24 hr tablet     escitalopram (LEXAPRO) 20 MG tablet     fluocinonide (LIDEX) 0.05 % external solution     fluorouracil (EFUDEX) 5 % external cream     montelukast (SINGULAIR) 10 mg tablet     naproxen sodium (ALEVE) 220 MG tablet     oxyCODONE (ROXICODONE) 5 MG tablet     sildenafil (VIAGRA) 50 MG tablet     tamsulosin (FLOMAX) 0.4 MG capsule     triamcinolone (KENALOG) 0.1 % external cream     zolpidem (AMBIEN) 5 MG tablet     No current facility-administered medications for this visit.        Allergies:   No Known Allergies     Social History     Socioeconomic History     Marital status:      Spouse name: Not on file     Number of children: Not on file     Years of education: Not on file     Highest education level: Not on file   Occupational History     Not on file   Tobacco Use     Smoking status: Never     Smokeless tobacco: Former     Types: Chew   Vaping Use     Vaping status: Never Used   Substance and Sexual Activity     Alcohol use: Yes     Alcohol/week: 2.0 standard drinks of alcohol     Drug use: No     Sexual activity: Yes     Partners: Female     Comment:    Other Topics Concern     Not on file   Social History Narrative     Not on file     Social Determinants of Health     Financial Resource Strain: Not on file   Food Insecurity: Not on file   Transportation Needs: Not on file   Physical Activity: Not on file   Stress: Not on file   Social Connections: Not on file   Intimate Partner Violence: Not on file   Housing Stability: Not on file       Family History   Problem Relation Age of Onset     No Known Problems Mother      Hypertension Father         Most Recent Immunizations   Administered Date(s)  "Administered     COVID-19 MONOVALENT 12+ (Pfizer) 11/01/2021     COVID-19 Monovalent 12+ (Pfizer 2022) 04/13/2022     Flu, Unspecified 03/02/2012     Influenza (IIV3) PF 12/17/2014     Influenza Vaccine 50-64 or 18-64 w/egg allergy (Flublok) 11/01/2021     Influenza Vaccine >6 months (Alfuria,Fluzone) 11/23/2020     Pneumococcal 23 valent 03/02/2012     TD,PF 7+ (Tenivac) 02/20/2020     TDAP (Adacel,Boostrix) 04/02/2019     Tdap (Adult) Unspecified Formulation 01/23/2009        Wt Readings from Last 3 Encounters:   06/19/23 94.8 kg (209 lb)   05/22/23 93 kg (205 lb)   03/03/23 99.5 kg (219 lb 4.8 oz)        BP Readings from Last 6 Encounters:   06/19/23 136/76   05/22/23 128/80   03/03/23 122/78   11/28/22 138/82   08/15/22 130/76   11/01/21 126/76        Hemoglobin A1C   Date Value Ref Range Status   03/03/2023 5.7 (H) 0.0 - 5.6 % Final     Comment:     Normal <5.7%   Prediabetes 5.7-6.4%    Diabetes 6.5% or higher     Note: Adopted from ADA consensus guidelines.   02/20/2020 6.0 3.5 - 6.0 % Final   03/02/2012 5.7 3.5 - 6.0 % Final        PHYSICAL EXAM:    /76   Pulse 64   Temp 98.6  F (37  C)   Resp 18   Ht 1.848 m (6' 0.75\")   Wt 94.8 kg (209 lb)   SpO2 98%   BMI 27.76 kg/m       General: Patient alert no signs of distress    On examination today there is no distinct palpable hernia.  Mild discomfort with Valsalva is reported in the region.  No other abnormalities noted.        Answers for HPI/ROS submitted by the patient on 6/16/2023  How many servings of fruits and vegetables do you eat daily?: 2-3  On average, how many sweetened beverages do you drink each day (Examples: soda, juice, sweet tea, etc.  Do NOT count diet or artificially sweetened beverages)?: 2  How many minutes a day do you exercise enough to make your heart beat faster?: 9 or less  How many days a week do you exercise enough to make your heart beat faster?: 3 or less  How many days per week do you miss taking your medication?: " 0  What is the reason for your visit today?: Pain in upper groin  When did your symptoms begin?: 3-7 days ago  What are your symptoms?: Pain when I blow my nose  How would you describe these symptoms?: Moderate  Are your symptoms:: Staying the same  Have you had these symptoms before?: No

## 2023-06-27 ENCOUNTER — TRANSFERRED RECORDS (OUTPATIENT)
Dept: HEALTH INFORMATION MANAGEMENT | Facility: CLINIC | Age: 63
End: 2023-06-27
Payer: COMMERCIAL

## 2023-06-29 ENCOUNTER — HOSPITAL ENCOUNTER (OUTPATIENT)
Dept: ULTRASOUND IMAGING | Facility: CLINIC | Age: 63
Discharge: HOME | End: 2023-06-29
Attending: FAMILY MEDICINE | Admitting: FAMILY MEDICINE
Payer: COMMERCIAL

## 2023-06-29 DIAGNOSIS — K40.90 RIGHT INGUINAL HERNIA: Primary | ICD-10-CM

## 2023-06-29 DIAGNOSIS — R10.31 RIGHT GROIN PAIN: ICD-10-CM

## 2023-06-29 PROCEDURE — 76705 ECHO EXAM OF ABDOMEN: CPT

## 2023-07-12 DIAGNOSIS — F90.0 ATTENTION-DEFICIT HYPERACTIVITY DISORDER, PREDOMINANTLY INATTENTIVE TYPE: ICD-10-CM

## 2023-07-12 RX ORDER — DEXTROAMPHETAMINE SACCHARATE, AMPHETAMINE ASPARTATE MONOHYDRATE, DEXTROAMPHETAMINE SULFATE AND AMPHETAMINE SULFATE 2.5; 2.5; 2.5; 2.5 MG/1; MG/1; MG/1; MG/1
10 CAPSULE, EXTENDED RELEASE ORAL DAILY
Qty: 30 CAPSULE | Refills: 0 | Status: SHIPPED | OUTPATIENT
Start: 2023-07-12 | End: 2023-08-15

## 2023-07-19 ENCOUNTER — OFFICE VISIT (OUTPATIENT)
Dept: SURGERY | Facility: CLINIC | Age: 63
End: 2023-07-19
Payer: COMMERCIAL

## 2023-07-19 VITALS
DIASTOLIC BLOOD PRESSURE: 85 MMHG | SYSTOLIC BLOOD PRESSURE: 133 MMHG | WEIGHT: 209 LBS | BODY MASS INDEX: 27.7 KG/M2 | HEIGHT: 73 IN

## 2023-07-19 DIAGNOSIS — K40.90 RIGHT INGUINAL HERNIA: ICD-10-CM

## 2023-07-19 DIAGNOSIS — R73.01 IMPAIRED FASTING GLUCOSE: Primary | ICD-10-CM

## 2023-07-19 PROCEDURE — 99204 OFFICE O/P NEW MOD 45 MIN: CPT | Performed by: SURGERY

## 2023-07-19 RX ORDER — CEFAZOLIN SODIUM/WATER 2 G/20 ML
2 SYRINGE (ML) INTRAVENOUS
Status: CANCELLED | OUTPATIENT
Start: 2023-08-02

## 2023-07-19 RX ORDER — CEFAZOLIN SODIUM/WATER 2 G/20 ML
2 SYRINGE (ML) INTRAVENOUS SEE ADMIN INSTRUCTIONS
Status: CANCELLED | OUTPATIENT
Start: 2023-08-02

## 2023-07-19 NOTE — PROGRESS NOTES
HPI:  Segundo Sellers is a 63 year old male who was referred to me by Segundo Dejesus for evaluation of right inguinal hernia.  The patient has been noticing some pain in his right groin for the last several weeks.  He presented to his primary care physician who was concerned for hernia.  He had an ultrasound that shows a right inguinal hernia.  Patient is very active.  He has some asthma that he manages with Symbicort.  He recently had a knee surgery.  He is anxious to have this repaired and get back to normal life.    Allergies:Patient has no known allergies.    Past Medical History:   Diagnosis Date     Abdominal pain 2/28/2020     Allergic rhinitis     Created by Conversion Replacement Utility updated for latest IMO load      Asthma     Created by Conversion      Benign Prostatic Hypertrophy     Created by Conversion      Current moderate episode of major depressive disorder without prior episode (H) 3/16/2020     Dyslipidemia 10/29/2021    Formatting of this note might be different from the original. Created by Conversion     Dyslipidemia     Created by Conversion      Eczema     Created by Conversion      Lethargy     Created by Conversion      Lower urinary tract symptoms 2/28/2020     Pelvic and perineal pain 4/22/2020     Slowing of urinary stream 2/28/2020       Past Surgical History:   Procedure Laterality Date     ARTHROSCOPY KNEE Left    No prior abdominal surgeries    Current Outpatient Medications   Medication Sig Dispense Refill     albuterol (PROAIR HFA) 90 mcg/actuation inhaler [ALBUTEROL (PROAIR HFA) 90 MCG/ACTUATION INHALER] Inhale 1-2 puffs. Every 4-6 hours as needed and as directed.       amphetamine-dextroamphetamine (ADDERALL XR) 10 MG 24 hr capsule TAKE 1 CAPSULE (10 MG) BY MOUTH DAILY 30 capsule 0     buPROPion (WELLBUTRIN XL) 150 MG 24 hr tablet Take 1 tablet (150 mg) by mouth every morning 30 tablet 10     escitalopram (LEXAPRO) 20 MG tablet Take 1 tablet (20 mg) by mouth daily 90  "tablet 2     fluocinonide (LIDEX) 0.05 % external solution        fluorouracil (EFUDEX) 5 % external cream        montelukast (SINGULAIR) 10 mg tablet [MONTELUKAST (SINGULAIR) 10 MG TABLET] Take 10 mg by mouth daily as needed.        naproxen sodium (ALEVE) 220 MG tablet [NAPROXEN SODIUM (ALEVE) 220 MG TABLET] Take 220 mg by mouth 2 (two) times a day with meals.       oxyCODONE (ROXICODONE) 5 MG tablet Take 1 tablet (5 mg) by mouth every 8 hours 90 tablet 0     sildenafil (VIAGRA) 50 MG tablet Take 1 tablet (50 mg) by mouth daily as needed (ED) 30 tablet 4     tamsulosin (FLOMAX) 0.4 MG capsule TAKE 2 CAPSULE(S) EVERY DAY BY ORAL ROUTE. 180 capsule 1     triamcinolone (KENALOG) 0.1 % external cream        zolpidem (AMBIEN) 5 MG tablet Take 1 tablet (5 mg) by mouth nightly as needed for sleep 30 tablet 1       Family History   Problem Relation Age of Onset     No Known Problems Mother      Hypertension Father         reports that he has never smoked. He has quit using smokeless tobacco.  His smokeless tobacco use included chew. He reports current alcohol use of about 2.0 standard drinks of alcohol per week. He reports that he does not use drugs.      /85   Ht 1.848 m (6' 0.75\")   Wt 94.8 kg (209 lb)   BMI 27.76 kg/m    Body mass index is 27.76 kg/m .    EXAM:  GENERAL: Well developed male in NAD  CV: RRR  PULM: Non-labored breathing on RA  ABDOMEN: Soft and nondistended.  Nontender  GROIN: Bulge in his right groin that is reducible.  I can feel defect.  Left side feels intact.  NEURO: No obvious deficits noted.  EXT: No edema, no obvious deformities or any other abnormalities    IMAGES: US reviewed  IMPRESSION:  1.  Small right inguinal hernia.    Assessment/Plan:    Segundo Sellers is a 63 year old male presenting with a symptomatic right inguinal hernia.  We discussed the pathophysiology of this disease.  We discussed treatment strategies including watch and wait versus surgery.  The patient would like " to have an operation for definitive repair.  I would recommend a robotic repair.  We discussed the details of the surgery including the use of mesh.  We discussed the risk of bleeding, infection, and injury to other structures, as well as recurrence.  Patient would like to proceed.  We will schedule at his convenience. He will need a pre-operative physical.       Neftaly Rm MD  General Surgeon  [facility]  Surgery [address] [address]?  Office: [phone]

## 2023-07-19 NOTE — LETTER
7/19/2023         RE: Segundo Sellers  [address] [place] [address] [address] [place] [address]        Dear Colleague,    Thank you for referring your patient, Segundo Sellers, to the [facility] [place]. Please see a copy of my visit note below.    HPI:  Segundo Sellers is a 63 year old male who was referred to me by Segundo Dejesus for evaluation of right inguinal hernia.  The patient has been noticing some pain in his right groin for the last several weeks.  He presented to his primary care physician who was concerned for hernia.  He had an ultrasound that shows a right inguinal hernia.  Patient is very active.  He has some asthma that he manages with Symbicort.  He recently had a knee surgery.  He is anxious to have this repaired and get back to normal life.    Allergies:Patient has no known allergies.    Past Medical History:   Diagnosis Date     Abdominal pain 2/28/2020     Allergic rhinitis     Created by Conversion Replacement Utility updated for latest IMO load      Asthma     Created by Conversion      Benign Prostatic Hypertrophy     Created by Conversion      Current moderate episode of major depressive disorder without prior episode (H) 3/16/2020     Dyslipidemia 10/29/2021    Formatting of this note might be different from the original. Created by Conversion     Dyslipidemia     Created by Conversion      Eczema     Created by Conversion      Lethargy     Created by Conversion      Lower urinary tract symptoms 2/28/2020     Pelvic and perineal pain 4/22/2020     Slowing of urinary stream 2/28/2020       Past Surgical History:   Procedure Laterality Date     ARTHROSCOPY KNEE Left    No prior abdominal surgeries    Current Outpatient Medications   Medication Sig Dispense Refill     albuterol (PROAIR HFA) 90 mcg/actuation inhaler [ALBUTEROL (PROAIR HFA) 90 MCG/ACTUATION INHALER] Inhale 1-2 puffs. Every 4-6 hours as needed and as directed.       amphetamine-dextroamphetamine  "(ADDERALL XR) 10 MG 24 hr capsule TAKE 1 CAPSULE (10 MG) BY MOUTH DAILY 30 capsule 0     buPROPion (WELLBUTRIN XL) 150 MG 24 hr tablet Take 1 tablet (150 mg) by mouth every morning 30 tablet 10     escitalopram (LEXAPRO) 20 MG tablet Take 1 tablet (20 mg) by mouth daily 90 tablet 2     fluocinonide (LIDEX) 0.05 % external solution        fluorouracil (EFUDEX) 5 % external cream        montelukast (SINGULAIR) 10 mg tablet [MONTELUKAST (SINGULAIR) 10 MG TABLET] Take 10 mg by mouth daily as needed.        naproxen sodium (ALEVE) 220 MG tablet [NAPROXEN SODIUM (ALEVE) 220 MG TABLET] Take 220 mg by mouth 2 (two) times a day with meals.       oxyCODONE (ROXICODONE) 5 MG tablet Take 1 tablet (5 mg) by mouth every 8 hours 90 tablet 0     sildenafil (VIAGRA) 50 MG tablet Take 1 tablet (50 mg) by mouth daily as needed (ED) 30 tablet 4     tamsulosin (FLOMAX) 0.4 MG capsule TAKE 2 CAPSULE(S) EVERY DAY BY ORAL ROUTE. 180 capsule 1     triamcinolone (KENALOG) 0.1 % external cream        zolpidem (AMBIEN) 5 MG tablet Take 1 tablet (5 mg) by mouth nightly as needed for sleep 30 tablet 1       Family History   Problem Relation Age of Onset     No Known Problems Mother      Hypertension Father         reports that he has never smoked. He has quit using smokeless tobacco.  His smokeless tobacco use included chew. He reports current alcohol use of about 2.0 standard drinks of alcohol per week. He reports that he does not use drugs.      /85   Ht 1.848 m (6' 0.75\")   Wt 94.8 kg (209 lb)   BMI 27.76 kg/m    Body mass index is 27.76 kg/m .    EXAM:  GENERAL: Well developed male in NAD  CV: RRR  PULM: Non-labored breathing on RA  ABDOMEN: Soft and nondistended.  Nontender  GROIN: Bulge in his right groin that is reducible.  I can feel defect.  Left side feels intact.  NEURO: No obvious deficits noted.  EXT: No edema, no obvious deformities or any other abnormalities    IMAGES: US reviewed  IMPRESSION:  1.  Small right inguinal " hernia.    Assessment/Plan:    Segundo Sellers is a 63 year old male presenting with a symptomatic right inguinal hernia.  We discussed the pathophysiology of this disease.  We discussed treatment strategies including watch and wait versus surgery.  The patient would like to have an operation for definitive repair.  I would recommend a robotic repair.  We discussed the details of the surgery including the use of mesh.  We discussed the risk of bleeding, infection, and injury to other structures, as well as recurrence.  Patient would like to proceed.  We will schedule at his convenience.      Neftaly Rm MD  General Surgeon  [facility]  Surgery [address] [address]?  Office: [phone]        Again, thank you for allowing me to participate in the care of your patient.        Sincerely,        Neftaly Rm MD

## 2023-07-20 ENCOUNTER — TELEPHONE (OUTPATIENT)
Dept: SURGERY | Facility: CLINIC | Age: 63
End: 2023-07-20
Payer: COMMERCIAL

## 2023-07-20 NOTE — TELEPHONE ENCOUNTER
I talked to Segundo this morning and scheduled his surgery with Dr. Rm for 08.02.23 at [facility]. We went over details and I let him know I will send a confirmation letter to his MyChart. He's in agreement with the plan.

## 2023-07-22 DIAGNOSIS — M48.061 SPINAL STENOSIS OF LUMBAR REGION WITHOUT NEUROGENIC CLAUDICATION: ICD-10-CM

## 2023-07-22 DIAGNOSIS — G89.29 OTHER CHRONIC PAIN: ICD-10-CM

## 2023-07-23 RX ORDER — OXYCODONE HYDROCHLORIDE 5 MG/1
5 TABLET ORAL EVERY 8 HOURS
Qty: 90 TABLET | Refills: 0 | Status: ON HOLD | OUTPATIENT
Start: 2023-07-23 | End: 2023-08-02

## 2023-07-26 ENCOUNTER — OFFICE VISIT (OUTPATIENT)
Dept: FAMILY MEDICINE | Facility: CLINIC | Age: 63
End: 2023-07-26
Payer: COMMERCIAL

## 2023-07-26 VITALS
DIASTOLIC BLOOD PRESSURE: 80 MMHG | BODY MASS INDEX: 27.39 KG/M2 | SYSTOLIC BLOOD PRESSURE: 133 MMHG | HEART RATE: 50 BPM | RESPIRATION RATE: 14 BRPM | OXYGEN SATURATION: 98 % | TEMPERATURE: 97.8 F | WEIGHT: 206.2 LBS

## 2023-07-26 DIAGNOSIS — K40.90 UNILATERAL INGUINAL HERNIA WITHOUT OBSTRUCTION OR GANGRENE, RECURRENCE NOT SPECIFIED: ICD-10-CM

## 2023-07-26 DIAGNOSIS — F90.0 ATTENTION-DEFICIT HYPERACTIVITY DISORDER, PREDOMINANTLY INATTENTIVE TYPE: ICD-10-CM

## 2023-07-26 DIAGNOSIS — J45.909 ASTHMA, UNSPECIFIED ASTHMA SEVERITY, UNSPECIFIED WHETHER COMPLICATED, UNSPECIFIED WHETHER PERSISTENT: ICD-10-CM

## 2023-07-26 DIAGNOSIS — F32.1 CURRENT MODERATE EPISODE OF MAJOR DEPRESSIVE DISORDER WITHOUT PRIOR EPISODE (H): ICD-10-CM

## 2023-07-26 DIAGNOSIS — G47.00 INSOMNIA, UNSPECIFIED TYPE: ICD-10-CM

## 2023-07-26 DIAGNOSIS — Z01.818 PREOP GENERAL PHYSICAL EXAM: Primary | ICD-10-CM

## 2023-07-26 DIAGNOSIS — Z11.4 SCREENING FOR HIV (HUMAN IMMUNODEFICIENCY VIRUS): ICD-10-CM

## 2023-07-26 DIAGNOSIS — M48.061 SPINAL STENOSIS OF LUMBAR REGION WITHOUT NEUROGENIC CLAUDICATION: ICD-10-CM

## 2023-07-26 DIAGNOSIS — Z11.59 NEED FOR HEPATITIS C SCREENING TEST: ICD-10-CM

## 2023-07-26 DIAGNOSIS — G89.29 OTHER CHRONIC PAIN: ICD-10-CM

## 2023-07-26 DIAGNOSIS — N52.9 ERECTILE DYSFUNCTION, UNSPECIFIED ERECTILE DYSFUNCTION TYPE: ICD-10-CM

## 2023-07-26 LAB
ANION GAP SERPL CALCULATED.3IONS-SCNC: 9 MMOL/L (ref 7–15)
BUN SERPL-MCNC: 20.1 MG/DL (ref 8–23)
CALCIUM SERPL-MCNC: 9.4 MG/DL (ref 8.8–10.2)
CHLORIDE SERPL-SCNC: 103 MMOL/L (ref 98–107)
CREAT SERPL-MCNC: 0.98 MG/DL (ref 0.67–1.17)
DEPRECATED HCO3 PLAS-SCNC: 26 MMOL/L (ref 22–29)
ERYTHROCYTE [DISTWIDTH] IN BLOOD BY AUTOMATED COUNT: 12.5 % (ref 10–15)
GFR SERPL CREATININE-BSD FRML MDRD: 87 ML/MIN/1.73M2
GLUCOSE SERPL-MCNC: 107 MG/DL (ref 70–99)
HCT VFR BLD AUTO: 42.9 % (ref 40–53)
HGB BLD-MCNC: 14.5 G/DL (ref 13.3–17.7)
MCH RBC QN AUTO: 31 PG (ref 26.5–33)
MCHC RBC AUTO-ENTMCNC: 33.8 G/DL (ref 31.5–36.5)
MCV RBC AUTO: 92 FL (ref 78–100)
PLATELET # BLD AUTO: 290 10E3/UL (ref 150–450)
POTASSIUM SERPL-SCNC: 4.6 MMOL/L (ref 3.4–5.3)
RBC # BLD AUTO: 4.68 10E6/UL (ref 4.4–5.9)
SODIUM SERPL-SCNC: 138 MMOL/L (ref 136–145)
WBC # BLD AUTO: 5 10E3/UL (ref 4–11)

## 2023-07-26 PROCEDURE — 93005 ELECTROCARDIOGRAM TRACING: CPT | Performed by: NURSE PRACTITIONER

## 2023-07-26 PROCEDURE — 80048 BASIC METABOLIC PNL TOTAL CA: CPT | Performed by: NURSE PRACTITIONER

## 2023-07-26 PROCEDURE — 36415 COLL VENOUS BLD VENIPUNCTURE: CPT | Performed by: NURSE PRACTITIONER

## 2023-07-26 PROCEDURE — 86803 HEPATITIS C AB TEST: CPT | Performed by: NURSE PRACTITIONER

## 2023-07-26 PROCEDURE — 87389 HIV-1 AG W/HIV-1&-2 AB AG IA: CPT | Performed by: NURSE PRACTITIONER

## 2023-07-26 PROCEDURE — 99214 OFFICE O/P EST MOD 30 MIN: CPT | Performed by: NURSE PRACTITIONER

## 2023-07-26 PROCEDURE — 85027 COMPLETE CBC AUTOMATED: CPT | Performed by: NURSE PRACTITIONER

## 2023-07-26 RX ORDER — SILDENAFIL 100 MG/1
100 TABLET, FILM COATED ORAL DAILY PRN
Qty: 30 TABLET | Refills: 0 | Status: SHIPPED | OUTPATIENT
Start: 2023-07-26 | End: 2023-07-26

## 2023-07-26 RX ORDER — SILDENAFIL 100 MG/1
100 TABLET, FILM COATED ORAL DAILY PRN
Qty: 30 TABLET | Refills: 0 | Status: SHIPPED | OUTPATIENT
Start: 2023-07-26 | End: 2023-08-14

## 2023-07-26 ASSESSMENT — PAIN SCALES - GENERAL: PAINLEVEL: NO PAIN (0)

## 2023-07-26 NOTE — H&P (VIEW-ONLY)
[facility]  [address]  [facility] [address]  Phone: [phone]  Fax: [phone]  Primary Provider: Segundo Dejesus  Pre-op Performing Provider: MIMA CHILDS      PREOPERATIVE EVALUATION:  Today's date: 7/26/2023    Segundo Sellers is a 63 year old male who presents for a preoperative evaluation.      7/26/2023     9:46 AM   Additional Questions   Roomed by Niru     Surgical Information:  Surgery/Procedure: Inguinal hernia repair  Surgery Location: [facility]  Surgeon: Dr. Rm  Surgery Date: 8/2/2023  Time of Surgery: TBD  Where patient plans to recover: At home with family  Fax number for surgical facility: Note does not need to be faxed, will be available electronically in Epic.    Assessment & Plan     The proposed surgical procedure is considered INTERMEDIATE risk.    Preop general physical exam  Unilateral inguinal hernia without obstruction or gangrene, recurrence not specified  Preoperative examination wascompleted today.  The exam is without any significant findings.  Lab work be completed as noted and patient will notify of any remarkable lab results. We reviewed his medications and allergies and no contraindications to the procedure identified today.  He may proceed with the schedules procedure of choice of anesthesia.  - CBC with platelets  - Basic metabolic panel  (Ca, Cl, CO2, Creat, Gluc, K, Na, BUN)  - EKG 12-lead, tracing only  - CBC with platelets  - Basic metabolic panel  (Ca, Cl, CO2, Creat, Gluc, K, Na, BUN)    Spinal stenosis of lumbar region without neurogenic claudication  Other chronic pain  Reviewed patient's history of chronic pain related to lumbar spinal stenosis.  Pain remains well controlled with use of oxycodone 5 mg every 8 hours.  PDMP was reviewed today.    Attention-deficit hyperactivity disorder, predominantly inattentive type  He continues to milligrams of Adderall daily for management of ADHD.    Insomnia,  unspecified type  He continues Ambien as needed for insomnia.    Current moderate episode of major depressive disorder without prior episode (H)  Patient feels that depression symptoms have improved over the last several months and he would like to wean off of the Lexapro.  We discussed doing so over the course of 1 to 2 weeks.  We will continue on Wellbutrin and follow-up in approximately 1 month to reassess.    Erectile dysfunction, unspecified erectile dysfunction type  I will increase dose of viagra to 100 mg and follow-up at his appointment in approximate 1.  - sildenafil (VIAGRA) 100 MG tablet  Dispense: 30 tablet; Refill: 0    Asthma, unspecified asthma severity, unspecified whether complicated, unspecified whether persistent  Asthma symptoms are well controlled with as needed use albuterol.  He will continue Singulair as well.    Screening for HIV (human immunodeficiency virus)  - HIV Antigen Antibody Combo  - HIV Antigen Antibody Combo    Need for hepatitis C screening test  - Hepatitis C Screen Reflex to HCV RNA Quant and Genotype  - Hepatitis C Screen Reflex to HCV RNA Quant and Genotype          - No identified additional risk factors other than previously addressed    Antiplatelet or Anticoagulation Medication Instructions:   - Patient is on no antiplatelet or anticoagulation medications.    Additional Medication Instructions:  Patient is to take all scheduled medications on the day of surgery, following the procedure    RECOMMENDATION:  APPROVAL GIVEN to proceed with proposed procedure, without further diagnostic evaluation.            Subjective       HPI related to upcoming procedure: The patient is today for preoperative examination.  He will be undergoing  A right inguinal hernia repair.  His medical history is significant for spinal stenosisMedical history is significant for spinal stenosis, chronic pain, ADHD, depression, insomnia, asthma and erectile dysfunction.  Overall, the patient's chronic  medical conditions are well controlled on his current medication regimen.  He has been taking Wellbutrin and escitalopram for management of depression, ever since the passing of his wife last year.  He states that he has a better state mentally and emotionally and would like to wean off of the Lexapro.  He feels that this is contributing to decrease libido.  He has been prescribed Viagra for erectal dysfunction but does not find the 50 mg is working.  He is requesting to increase the dose today.  He continues tamsulosin He continues tamsulosin as well.  patient ADHD symptoms are managed with Adderall. Chronic pain is managed with oxycodone 5 mg every 8 hours.  PDMP was reviewed today.  He does Ambien for management of insomnia.  Patient has asthma symptoms have been controlled without the use of albuterol.  He conntinues Singulair also.    Patient denies any history of complications related to anesthesia.  He has no history underlying bleeding or clotting disorder.     7/25/2023    11:09 AM   Preop Questions   1. Have you ever had a heart attack or stroke? No   2. Have you ever had surgery on your heart or blood vessels, such as a stent placement, a coronary artery bypass, or surgery on an artery in your head, neck, heart, or legs? No   3. Do you have chest pain with activity? No   4. Do you have a history of  heart failure? No   5. Do you currently have a cold, bronchitis or symptoms of other infection? No   6. Do you have a cough, shortness of breath, or wheezing? No   7. Do you or anyone in your family have previous history of blood clots? No   8. Do you or does anyone in your family have a serious bleeding problem such as prolonged bleeding following surgeries or cuts? No   9. Have you ever had problems with anemia or been told to take iron pills? No   10. Have you had any abnormal blood loss such as black, tarry or bloody stools? No   11. Have you ever had a blood transfusion? No   12. Are you willing to have a  blood transfusion if it is medically needed before, during, or after your surgery? Yes   13. Have you or any of your relatives ever had problems with anesthesia? No   14. Do you have sleep apnea, excessive snoring or daytime drowsiness? No   15. Do you have any artifical heart valves or other implanted medical devices like a pacemaker, defibrillator, or continuous glucose monitor? No   16. Do you have artificial joints? No   17. Are you allergic to latex? No       Health Care Directive:  Patient does not have a Health Care Directive or Living Will: Patient states has Advance Directive and will bring in a copy to clinic.    Preoperative Review of :   reviewed - controlled substances reflected in medication list.      Status of Chronic Conditions:  See problem list for active medical problems.  Problems all longstanding and stable, except as noted/documented.  See ROS for pertinent symptoms related to these conditions.      Review of Systems  CONSTITUTIONAL: NEGATIVE for fever, chills, change in weight  INTEGUMENTARY/SKIN: NEGATIVE for worrisome rashes, moles or lesions  EYES: NEGATIVE for vision changes or irritation  ENT/MOUTH: NEGATIVE for ear, mouth and throat problems  RESP: NEGATIVE for significant cough or SOB  CV: NEGATIVE for chest pain, palpitations or peripheral edema  GI: NEGATIVE for nausea, abdominal pain, heartburn, or change in bowel habits  : NEGATIVE for frequency, dysuria, or hematuria  MUSCULOSKELETAL: NEGATIVE for significant arthralgias or myalgia  NEURO: NEGATIVE for weakness, dizziness or paresthesias  ENDOCRINE: NEGATIVE for temperature intolerance, skin/hair changes  HEME: NEGATIVE for bleeding problems  PSYCHIATRIC: NEGATIVE for changes in mood or affect    Patient Active Problem List    Diagnosis Date Noted     Dyslipidemia 10/29/2021     Priority: Medium     Formatting of this note might be different from the original.  Created by Conversion       Pelvic and perineal pain  04/22/2020     Priority: Medium     Current moderate episode of major depressive disorder without prior episode (H) 03/16/2020     Priority: Medium     Slowing of urinary stream 02/28/2020     Priority: Medium     Abdominal pain 02/28/2020     Priority: Medium     Lower urinary tract symptoms 02/28/2020     Priority: Medium     Asthma      Priority: Medium     Created by Conversion         Allergic Rhinitis      Priority: Medium     Created by Conversion  Replacement Utility updated for latest IMO load         Eczema      Priority: Medium     Created by Conversion         Dyslipidemia      Priority: Medium     Created by Conversion         Benign Prostatic Hypertrophy      Priority: Medium     Created by Conversion         Lethargy      Priority: Medium     Created by Conversion         Impaired Fasting Glucose      Priority: Medium     Created by Conversion          Past Medical History:   Diagnosis Date     Abdominal pain 2/28/2020     Allergic rhinitis     Created by Conversion Replacement Utility updated for latest IMO load      Asthma     Created by Conversion      Benign Prostatic Hypertrophy     Created by Conversion      Current moderate episode of major depressive disorder without prior episode (H) 3/16/2020     Dyslipidemia 10/29/2021    Formatting of this note might be different from the original. Created by Conversion     Dyslipidemia     Created by Conversion      Eczema     Created by Conversion      Lethargy     Created by Conversion      Lower urinary tract symptoms 2/28/2020     Pelvic and perineal pain 4/22/2020     Slowing of urinary stream 2/28/2020     Past Surgical History:   Procedure Laterality Date     ARTHROSCOPY KNEE Left      Current Outpatient Medications   Medication Sig Dispense Refill     albuterol (PROAIR HFA) 90 mcg/actuation inhaler [ALBUTEROL (PROAIR HFA) 90 MCG/ACTUATION INHALER] Inhale 1-2 puffs. Every 4-6 hours as needed and as directed.       amphetamine-dextroamphetamine  (ADDERALL XR) 10 MG 24 hr capsule TAKE 1 CAPSULE (10 MG) BY MOUTH DAILY 30 capsule 0     buPROPion (WELLBUTRIN XL) 150 MG 24 hr tablet Take 1 tablet (150 mg) by mouth every morning 30 tablet 10     escitalopram (LEXAPRO) 20 MG tablet Take 1 tablet (20 mg) by mouth daily 90 tablet 2     fluocinonide (LIDEX) 0.05 % external solution        fluorouracil (EFUDEX) 5 % external cream        montelukast (SINGULAIR) 10 mg tablet [MONTELUKAST (SINGULAIR) 10 MG TABLET] Take 10 mg by mouth daily as needed.        oxyCODONE (ROXICODONE) 5 MG tablet TAKE 1 TABLET (5 MG) BY MOUTH EVERY 8 HOURS 90 tablet 0     sildenafil (VIAGRA) 50 MG tablet Take 1 tablet (50 mg) by mouth daily as needed (ED) 30 tablet 4     tamsulosin (FLOMAX) 0.4 MG capsule TAKE 2 CAPSULE(S) EVERY DAY BY ORAL ROUTE. 180 capsule 1     triamcinolone (KENALOG) 0.1 % external cream        zolpidem (AMBIEN) 5 MG tablet Take 1 tablet (5 mg) by mouth nightly as needed for sleep 30 tablet 1     naproxen sodium (ALEVE) 220 MG tablet [NAPROXEN SODIUM (ALEVE) 220 MG TABLET] Take 220 mg by mouth 2 (two) times a day with meals. (Patient not taking: Reported on 7/26/2023)         No Known Allergies     Social History     Tobacco Use     Smoking status: Never     Smokeless tobacco: Former     Types: Chew   Substance Use Topics     Alcohol use: Yes     Alcohol/week: 2.0 standard drinks of alcohol     Family History   Problem Relation Age of Onset     No Known Problems Mother      Hypertension Father      History   Drug Use No         Objective     BP (!) 144/82 (BP Location: Left arm, Patient Position: Sitting, Cuff Size: Adult Large)   Pulse 50   Temp 97.8  F (36.6  C) (Temporal)   Resp 14   Wt 93.5 kg (206 lb 3.2 oz)   SpO2 98%   BMI 27.39 kg/m      Physical Exam    GENERAL APPEARANCE: healthy, alert and no distress     EYES: EOMI,  PERRL     HENT: ear canals and TM's normal and nose and mouth without ulcers or lesions     NECK: no adenopathy, no asymmetry, masses, or  scars and thyroid normal to palpation     RESP: lungs clear to auscultation - no rales, rhonchi or wheezes     CV: regular rates and rhythm, normal S1 S2, no S3 or S4 and no murmur, click or rub     ABDOMEN:  soft, nontender, no HSM or masses and bowel sounds normal     MS: extremities normal- no gross deformities noted, no evidence of inflammation in joints, FROM in all extremities.     SKIN: no suspicious lesions or rashes     NEURO: Normal strength and tone, sensory exam grossly normal, mentation intact and speech normal     PSYCH: mentation appears normal. and affect normal/bright     LYMPHATICS: No cervical adenopathy    Recent Labs   Lab Test 03/03/23  1036   HGB 14.3         POTASSIUM 4.3   CR 1.05   A1C 5.7*        Diagnostics:  Labs pending at this time.  Results will be reviewed when available.   EKG: appears normal, NSR, no significant findings noted, unchanged from previous tracings    Revised Cardiac Risk Index (RCRI):  The patient has the following serious cardiovascular risks for perioperative complications:   - No serious cardiac risks = 0 points     RCRI Interpretation: 0 points: Class I (very low risk - 0.4% complication rate)           Signed Electronically by: AURORA Garcia CNP  Copy of this evaluation report is provided to requesting physician.

## 2023-07-26 NOTE — PROGRESS NOTES
[facility]  [address]  [facility] [address]  Phone: [phone]  Fax: [phone]  Primary Provider: Segundo Dejesus  Pre-op Performing Provider: MIMA CHILDS      PREOPERATIVE EVALUATION:  Today's date: 7/26/2023    Segundo Sellers is a 63 year old male who presents for a preoperative evaluation.      7/26/2023     9:46 AM   Additional Questions   Roomed by Niru     Surgical Information:  Surgery/Procedure: Inguinal hernia repair  Surgery Location: [facility]  Surgeon: Dr. Rm  Surgery Date: 8/2/2023  Time of Surgery: TBD  Where patient plans to recover: At home with family  Fax number for surgical facility: Note does not need to be faxed, will be available electronically in Epic.    Assessment & Plan     The proposed surgical procedure is considered INTERMEDIATE risk.    Preop general physical exam  Unilateral inguinal hernia without obstruction or gangrene, recurrence not specified  Preoperative examination wascompleted today.  The exam is without any significant findings.  Lab work be completed as noted and patient will notify of any remarkable lab results. We reviewed his medications and allergies and no contraindications to the procedure identified today.  He may proceed with the schedules procedure of choice of anesthesia.  - CBC with platelets  - Basic metabolic panel  (Ca, Cl, CO2, Creat, Gluc, K, Na, BUN)  - EKG 12-lead, tracing only  - CBC with platelets  - Basic metabolic panel  (Ca, Cl, CO2, Creat, Gluc, K, Na, BUN)    Spinal stenosis of lumbar region without neurogenic claudication  Other chronic pain  Reviewed patient's history of chronic pain related to lumbar spinal stenosis.  Pain remains well controlled with use of oxycodone 5 mg every 8 hours.  PDMP was reviewed today.    Attention-deficit hyperactivity disorder, predominantly inattentive type  He continues to milligrams of Adderall daily for management of ADHD.    Insomnia,  unspecified type  He continues Ambien as needed for insomnia.    Current moderate episode of major depressive disorder without prior episode (H)  Patient feels that depression symptoms have improved over the last several months and he would like to wean off of the Lexapro.  We discussed doing so over the course of 1 to 2 weeks.  We will continue on Wellbutrin and follow-up in approximately 1 month to reassess.    Erectile dysfunction, unspecified erectile dysfunction type  I will increase dose of viagra to 100 mg and follow-up at his appointment in approximate 1.  - sildenafil (VIAGRA) 100 MG tablet  Dispense: 30 tablet; Refill: 0    Asthma, unspecified asthma severity, unspecified whether complicated, unspecified whether persistent  Asthma symptoms are well controlled with as needed use albuterol.  He will continue Singulair as well.    Screening for HIV (human immunodeficiency virus)  - HIV Antigen Antibody Combo  - HIV Antigen Antibody Combo    Need for hepatitis C screening test  - Hepatitis C Screen Reflex to HCV RNA Quant and Genotype  - Hepatitis C Screen Reflex to HCV RNA Quant and Genotype          - No identified additional risk factors other than previously addressed    Antiplatelet or Anticoagulation Medication Instructions:   - Patient is on no antiplatelet or anticoagulation medications.    Additional Medication Instructions:  Patient is to take all scheduled medications on the day of surgery, following the procedure    RECOMMENDATION:  APPROVAL GIVEN to proceed with proposed procedure, without further diagnostic evaluation.            Subjective       HPI related to upcoming procedure: The patient is today for preoperative examination.  He will be undergoing  A right inguinal hernia repair.  His medical history is significant for spinal stenosisMedical history is significant for spinal stenosis, chronic pain, ADHD, depression, insomnia, asthma and erectile dysfunction.  Overall, the patient's chronic  medical conditions are well controlled on his current medication regimen.  He has been taking Wellbutrin and escitalopram for management of depression, ever since the passing of his wife last year.  He states that he has a better state mentally and emotionally and would like to wean off of the Lexapro.  He feels that this is contributing to decrease libido.  He has been prescribed Viagra for erectal dysfunction but does not find the 50 mg is working.  He is requesting to increase the dose today.  He continues tamsulosin He continues tamsulosin as well.  patient ADHD symptoms are managed with Adderall. Chronic pain is managed with oxycodone 5 mg every 8 hours.  PDMP was reviewed today.  He does Ambien for management of insomnia.  Patient has asthma symptoms have been controlled without the use of albuterol.  He conntinues Singulair also.    Patient denies any history of complications related to anesthesia.  He has no history underlying bleeding or clotting disorder.     7/25/2023    11:09 AM   Preop Questions   1. Have you ever had a heart attack or stroke? No   2. Have you ever had surgery on your heart or blood vessels, such as a stent placement, a coronary artery bypass, or surgery on an artery in your head, neck, heart, or legs? No   3. Do you have chest pain with activity? No   4. Do you have a history of  heart failure? No   5. Do you currently have a cold, bronchitis or symptoms of other infection? No   6. Do you have a cough, shortness of breath, or wheezing? No   7. Do you or anyone in your family have previous history of blood clots? No   8. Do you or does anyone in your family have a serious bleeding problem such as prolonged bleeding following surgeries or cuts? No   9. Have you ever had problems with anemia or been told to take iron pills? No   10. Have you had any abnormal blood loss such as black, tarry or bloody stools? No   11. Have you ever had a blood transfusion? No   12. Are you willing to have a  blood transfusion if it is medically needed before, during, or after your surgery? Yes   13. Have you or any of your relatives ever had problems with anesthesia? No   14. Do you have sleep apnea, excessive snoring or daytime drowsiness? No   15. Do you have any artifical heart valves or other implanted medical devices like a pacemaker, defibrillator, or continuous glucose monitor? No   16. Do you have artificial joints? No   17. Are you allergic to latex? No       Health Care Directive:  Patient does not have a Health Care Directive or Living Will: Patient states has Advance Directive and will bring in a copy to clinic.    Preoperative Review of :   reviewed - controlled substances reflected in medication list.      Status of Chronic Conditions:  See problem list for active medical problems.  Problems all longstanding and stable, except as noted/documented.  See ROS for pertinent symptoms related to these conditions.      Review of Systems  CONSTITUTIONAL: NEGATIVE for fever, chills, change in weight  INTEGUMENTARY/SKIN: NEGATIVE for worrisome rashes, moles or lesions  EYES: NEGATIVE for vision changes or irritation  ENT/MOUTH: NEGATIVE for ear, mouth and throat problems  RESP: NEGATIVE for significant cough or SOB  CV: NEGATIVE for chest pain, palpitations or peripheral edema  GI: NEGATIVE for nausea, abdominal pain, heartburn, or change in bowel habits  : NEGATIVE for frequency, dysuria, or hematuria  MUSCULOSKELETAL: NEGATIVE for significant arthralgias or myalgia  NEURO: NEGATIVE for weakness, dizziness or paresthesias  ENDOCRINE: NEGATIVE for temperature intolerance, skin/hair changes  HEME: NEGATIVE for bleeding problems  PSYCHIATRIC: NEGATIVE for changes in mood or affect    Patient Active Problem List    Diagnosis Date Noted     Dyslipidemia 10/29/2021     Priority: Medium     Formatting of this note might be different from the original.  Created by Conversion       Pelvic and perineal pain  04/22/2020     Priority: Medium     Current moderate episode of major depressive disorder without prior episode (H) 03/16/2020     Priority: Medium     Slowing of urinary stream 02/28/2020     Priority: Medium     Abdominal pain 02/28/2020     Priority: Medium     Lower urinary tract symptoms 02/28/2020     Priority: Medium     Asthma      Priority: Medium     Created by Conversion         Allergic Rhinitis      Priority: Medium     Created by Conversion  Replacement Utility updated for latest IMO load         Eczema      Priority: Medium     Created by Conversion         Dyslipidemia      Priority: Medium     Created by Conversion         Benign Prostatic Hypertrophy      Priority: Medium     Created by Conversion         Lethargy      Priority: Medium     Created by Conversion         Impaired Fasting Glucose      Priority: Medium     Created by Conversion          Past Medical History:   Diagnosis Date     Abdominal pain 2/28/2020     Allergic rhinitis     Created by Conversion Replacement Utility updated for latest IMO load      Asthma     Created by Conversion      Benign Prostatic Hypertrophy     Created by Conversion      Current moderate episode of major depressive disorder without prior episode (H) 3/16/2020     Dyslipidemia 10/29/2021    Formatting of this note might be different from the original. Created by Conversion     Dyslipidemia     Created by Conversion      Eczema     Created by Conversion      Lethargy     Created by Conversion      Lower urinary tract symptoms 2/28/2020     Pelvic and perineal pain 4/22/2020     Slowing of urinary stream 2/28/2020     Past Surgical History:   Procedure Laterality Date     ARTHROSCOPY KNEE Left      Current Outpatient Medications   Medication Sig Dispense Refill     albuterol (PROAIR HFA) 90 mcg/actuation inhaler [ALBUTEROL (PROAIR HFA) 90 MCG/ACTUATION INHALER] Inhale 1-2 puffs. Every 4-6 hours as needed and as directed.       amphetamine-dextroamphetamine  (ADDERALL XR) 10 MG 24 hr capsule TAKE 1 CAPSULE (10 MG) BY MOUTH DAILY 30 capsule 0     buPROPion (WELLBUTRIN XL) 150 MG 24 hr tablet Take 1 tablet (150 mg) by mouth every morning 30 tablet 10     escitalopram (LEXAPRO) 20 MG tablet Take 1 tablet (20 mg) by mouth daily 90 tablet 2     fluocinonide (LIDEX) 0.05 % external solution        fluorouracil (EFUDEX) 5 % external cream        montelukast (SINGULAIR) 10 mg tablet [MONTELUKAST (SINGULAIR) 10 MG TABLET] Take 10 mg by mouth daily as needed.        oxyCODONE (ROXICODONE) 5 MG tablet TAKE 1 TABLET (5 MG) BY MOUTH EVERY 8 HOURS 90 tablet 0     sildenafil (VIAGRA) 50 MG tablet Take 1 tablet (50 mg) by mouth daily as needed (ED) 30 tablet 4     tamsulosin (FLOMAX) 0.4 MG capsule TAKE 2 CAPSULE(S) EVERY DAY BY ORAL ROUTE. 180 capsule 1     triamcinolone (KENALOG) 0.1 % external cream        zolpidem (AMBIEN) 5 MG tablet Take 1 tablet (5 mg) by mouth nightly as needed for sleep 30 tablet 1     naproxen sodium (ALEVE) 220 MG tablet [NAPROXEN SODIUM (ALEVE) 220 MG TABLET] Take 220 mg by mouth 2 (two) times a day with meals. (Patient not taking: Reported on 7/26/2023)         No Known Allergies     Social History     Tobacco Use     Smoking status: Never     Smokeless tobacco: Former     Types: Chew   Substance Use Topics     Alcohol use: Yes     Alcohol/week: 2.0 standard drinks of alcohol     Family History   Problem Relation Age of Onset     No Known Problems Mother      Hypertension Father      History   Drug Use No         Objective     BP (!) 144/82 (BP Location: Left arm, Patient Position: Sitting, Cuff Size: Adult Large)   Pulse 50   Temp 97.8  F (36.6  C) (Temporal)   Resp 14   Wt 93.5 kg (206 lb 3.2 oz)   SpO2 98%   BMI 27.39 kg/m      Physical Exam    GENERAL APPEARANCE: healthy, alert and no distress     EYES: EOMI,  PERRL     HENT: ear canals and TM's normal and nose and mouth without ulcers or lesions     NECK: no adenopathy, no asymmetry, masses, or  scars and thyroid normal to palpation     RESP: lungs clear to auscultation - no rales, rhonchi or wheezes     CV: regular rates and rhythm, normal S1 S2, no S3 or S4 and no murmur, click or rub     ABDOMEN:  soft, nontender, no HSM or masses and bowel sounds normal     MS: extremities normal- no gross deformities noted, no evidence of inflammation in joints, FROM in all extremities.     SKIN: no suspicious lesions or rashes     NEURO: Normal strength and tone, sensory exam grossly normal, mentation intact and speech normal     PSYCH: mentation appears normal. and affect normal/bright     LYMPHATICS: No cervical adenopathy    Recent Labs   Lab Test 03/03/23  1036   HGB 14.3         POTASSIUM 4.3   CR 1.05   A1C 5.7*        Diagnostics:  Labs pending at this time.  Results will be reviewed when available.   EKG: appears normal, NSR, no significant findings noted, unchanged from previous tracings    Revised Cardiac Risk Index (RCRI):  The patient has the following serious cardiovascular risks for perioperative complications:   - No serious cardiac risks = 0 points     RCRI Interpretation: 0 points: Class I (very low risk - 0.4% complication rate)           Signed Electronically by: AURORA Garcia CNP  Copy of this evaluation report is provided to requesting physician.

## 2023-07-27 LAB
ATRIAL RATE - MUSE: 49 BPM
DIASTOLIC BLOOD PRESSURE - MUSE: NORMAL MMHG
HCV AB SERPL QL IA: NONREACTIVE
HIV 1+2 AB+HIV1 P24 AG SERPL QL IA: NONREACTIVE
INTERPRETATION ECG - MUSE: NORMAL
P AXIS - MUSE: 71 DEGREES
PR INTERVAL - MUSE: 276 MS
QRS DURATION - MUSE: 94 MS
QT - MUSE: 438 MS
QTC - MUSE: 395 MS
R AXIS - MUSE: 16 DEGREES
SYSTOLIC BLOOD PRESSURE - MUSE: NORMAL MMHG
T AXIS - MUSE: 71 DEGREES
VENTRICULAR RATE- MUSE: 49 BPM

## 2023-07-27 PROCEDURE — 93010 ELECTROCARDIOGRAM REPORT: CPT | Performed by: INTERNAL MEDICINE

## 2023-08-01 ENCOUNTER — ANESTHESIA EVENT (OUTPATIENT)
Dept: SURGERY | Facility: CLINIC | Age: 63
End: 2023-08-01
Payer: COMMERCIAL

## 2023-08-02 ENCOUNTER — HOSPITAL ENCOUNTER (OUTPATIENT)
Facility: CLINIC | Age: 63
Discharge: HOME | End: 2023-08-02
Attending: SURGERY | Admitting: SURGERY
Payer: COMMERCIAL

## 2023-08-02 ENCOUNTER — ANESTHESIA (OUTPATIENT)
Dept: SURGERY | Facility: CLINIC | Age: 63
End: 2023-08-02
Payer: COMMERCIAL

## 2023-08-02 VITALS
OXYGEN SATURATION: 97 % | TEMPERATURE: 97.2 F | HEART RATE: 48 BPM | HEIGHT: 73 IN | BODY MASS INDEX: 26.65 KG/M2 | RESPIRATION RATE: 13 BRPM | SYSTOLIC BLOOD PRESSURE: 127 MMHG | WEIGHT: 201.1 LBS | DIASTOLIC BLOOD PRESSURE: 60 MMHG

## 2023-08-02 DIAGNOSIS — Z98.890 S/P INGUINAL HERNIA REPAIR: Primary | ICD-10-CM

## 2023-08-02 DIAGNOSIS — Z87.19 S/P INGUINAL HERNIA REPAIR: Primary | ICD-10-CM

## 2023-08-02 PROCEDURE — 250N000011 HC RX IP 250 OP 636: Performed by: SURGERY

## 2023-08-02 PROCEDURE — 250N000013 HC RX MED GY IP 250 OP 250 PS 637: Performed by: ANESTHESIOLOGY

## 2023-08-02 PROCEDURE — 272N000001 HC OR GENERAL SUPPLY STERILE: Performed by: SURGERY

## 2023-08-02 PROCEDURE — 250N000025 HC SEVOFLURANE, PER MIN: Performed by: SURGERY

## 2023-08-02 PROCEDURE — 710N000010 HC RECOVERY PHASE 1, LEVEL 2, PER MIN: Performed by: SURGERY

## 2023-08-02 PROCEDURE — 370N000017 HC ANESTHESIA TECHNICAL FEE, PER MIN: Performed by: SURGERY

## 2023-08-02 PROCEDURE — 250N000011 HC RX IP 250 OP 636: Performed by: NURSE ANESTHETIST, CERTIFIED REGISTERED

## 2023-08-02 PROCEDURE — C1781 MESH (IMPLANTABLE): HCPCS | Performed by: SURGERY

## 2023-08-02 PROCEDURE — 258N000003 HC RX IP 258 OP 636: Performed by: ANESTHESIOLOGY

## 2023-08-02 PROCEDURE — 710N000012 HC RECOVERY PHASE 2, PER MINUTE: Performed by: SURGERY

## 2023-08-02 PROCEDURE — 999N000141 HC STATISTIC PRE-PROCEDURE NURSING ASSESSMENT: Performed by: SURGERY

## 2023-08-02 PROCEDURE — 250N000011 HC RX IP 250 OP 636: Performed by: ANESTHESIOLOGY

## 2023-08-02 PROCEDURE — 360N000080 HC SURGERY LEVEL 7, PER MIN: Performed by: SURGERY

## 2023-08-02 PROCEDURE — 49650 LAP ING HERNIA REPAIR INIT: CPT | Mod: RT | Performed by: SURGERY

## 2023-08-02 PROCEDURE — 250N000009 HC RX 250: Performed by: ANESTHESIOLOGY

## 2023-08-02 DEVICE — LAPAROSCOPIC SELF-FIXATING MESH POLYESTER WITH POLYLACTIC ACID GRIPS AND COLLAGEN FILM
Type: IMPLANTABLE DEVICE | Site: INGUINAL | Status: FUNCTIONAL
Brand: PROGRIP

## 2023-08-02 RX ORDER — ONDANSETRON 2 MG/ML
INJECTION INTRAMUSCULAR; INTRAVENOUS PRN
Status: DISCONTINUED | OUTPATIENT
Start: 2023-08-02 | End: 2023-08-02

## 2023-08-02 RX ORDER — HYDROMORPHONE HCL IN WATER/PF 6 MG/30 ML
0.4 PATIENT CONTROLLED ANALGESIA SYRINGE INTRAVENOUS EVERY 5 MIN PRN
Status: DISCONTINUED | OUTPATIENT
Start: 2023-08-02 | End: 2023-08-02 | Stop reason: HOSPADM

## 2023-08-02 RX ORDER — CEFAZOLIN SODIUM/WATER 2 G/20 ML
2 SYRINGE (ML) INTRAVENOUS
Status: COMPLETED | OUTPATIENT
Start: 2023-08-02 | End: 2023-08-02

## 2023-08-02 RX ORDER — SODIUM CHLORIDE, SODIUM LACTATE, POTASSIUM CHLORIDE, CALCIUM CHLORIDE 600; 310; 30; 20 MG/100ML; MG/100ML; MG/100ML; MG/100ML
INJECTION, SOLUTION INTRAVENOUS CONTINUOUS
Status: DISCONTINUED | OUTPATIENT
Start: 2023-08-02 | End: 2023-08-02 | Stop reason: HOSPADM

## 2023-08-02 RX ORDER — BUPIVACAINE HYDROCHLORIDE 2.5 MG/ML
INJECTION, SOLUTION INFILTRATION; PERINEURAL PRN
Status: DISCONTINUED | OUTPATIENT
Start: 2023-08-02 | End: 2023-08-02 | Stop reason: HOSPADM

## 2023-08-02 RX ORDER — FENTANYL CITRATE 50 UG/ML
INJECTION, SOLUTION INTRAMUSCULAR; INTRAVENOUS PRN
Status: DISCONTINUED | OUTPATIENT
Start: 2023-08-02 | End: 2023-08-02

## 2023-08-02 RX ORDER — PROPOFOL 10 MG/ML
INJECTION, EMULSION INTRAVENOUS CONTINUOUS PRN
Status: DISCONTINUED | OUTPATIENT
Start: 2023-08-02 | End: 2023-08-02

## 2023-08-02 RX ORDER — OXYCODONE HYDROCHLORIDE 5 MG/1
10 TABLET ORAL
Status: COMPLETED | OUTPATIENT
Start: 2023-08-02 | End: 2023-08-02

## 2023-08-02 RX ORDER — KETAMINE HYDROCHLORIDE 10 MG/ML
INJECTION INTRAMUSCULAR; INTRAVENOUS PRN
Status: DISCONTINUED | OUTPATIENT
Start: 2023-08-02 | End: 2023-08-02

## 2023-08-02 RX ORDER — ONDANSETRON 4 MG/1
4 TABLET, ORALLY DISINTEGRATING ORAL EVERY 30 MIN PRN
Status: DISCONTINUED | OUTPATIENT
Start: 2023-08-02 | End: 2023-08-02 | Stop reason: HOSPADM

## 2023-08-02 RX ORDER — HYDROMORPHONE HCL IN WATER/PF 6 MG/30 ML
0.2 PATIENT CONTROLLED ANALGESIA SYRINGE INTRAVENOUS EVERY 5 MIN PRN
Status: DISCONTINUED | OUTPATIENT
Start: 2023-08-02 | End: 2023-08-02 | Stop reason: HOSPADM

## 2023-08-02 RX ORDER — FENTANYL CITRATE 50 UG/ML
50 INJECTION, SOLUTION INTRAMUSCULAR; INTRAVENOUS EVERY 5 MIN PRN
Status: DISCONTINUED | OUTPATIENT
Start: 2023-08-02 | End: 2023-08-02 | Stop reason: HOSPADM

## 2023-08-02 RX ORDER — HALOPERIDOL 5 MG/ML
1 INJECTION INTRAMUSCULAR
Status: DISCONTINUED | OUTPATIENT
Start: 2023-08-02 | End: 2023-08-02 | Stop reason: HOSPADM

## 2023-08-02 RX ORDER — OXYCODONE HYDROCHLORIDE 5 MG/1
5 TABLET ORAL EVERY 8 HOURS PRN
Status: ON HOLD | COMMUNITY
End: 2023-08-02

## 2023-08-02 RX ORDER — LIDOCAINE HYDROCHLORIDE 10 MG/ML
INJECTION, SOLUTION INFILTRATION; PERINEURAL PRN
Status: DISCONTINUED | OUTPATIENT
Start: 2023-08-02 | End: 2023-08-02

## 2023-08-02 RX ORDER — LIDOCAINE 40 MG/G
CREAM TOPICAL
Status: DISCONTINUED | OUTPATIENT
Start: 2023-08-02 | End: 2023-08-02 | Stop reason: HOSPADM

## 2023-08-02 RX ORDER — OXYCODONE HYDROCHLORIDE 5 MG/1
5 TABLET ORAL
Status: DISCONTINUED | OUTPATIENT
Start: 2023-08-02 | End: 2023-08-02 | Stop reason: HOSPADM

## 2023-08-02 RX ORDER — CEFAZOLIN SODIUM/WATER 2 G/20 ML
2 SYRINGE (ML) INTRAVENOUS SEE ADMIN INSTRUCTIONS
Status: DISCONTINUED | OUTPATIENT
Start: 2023-08-02 | End: 2023-08-02 | Stop reason: HOSPADM

## 2023-08-02 RX ORDER — DIPHENHYDRAMINE HCL 25 MG
25 CAPSULE ORAL EVERY 6 HOURS PRN
Status: DISCONTINUED | OUTPATIENT
Start: 2023-08-02 | End: 2023-08-02 | Stop reason: HOSPADM

## 2023-08-02 RX ORDER — ONDANSETRON 2 MG/ML
4 INJECTION INTRAMUSCULAR; INTRAVENOUS EVERY 30 MIN PRN
Status: DISCONTINUED | OUTPATIENT
Start: 2023-08-02 | End: 2023-08-02 | Stop reason: HOSPADM

## 2023-08-02 RX ORDER — DIPHENHYDRAMINE HYDROCHLORIDE 50 MG/ML
25 INJECTION INTRAMUSCULAR; INTRAVENOUS EVERY 6 HOURS PRN
Status: DISCONTINUED | OUTPATIENT
Start: 2023-08-02 | End: 2023-08-02 | Stop reason: HOSPADM

## 2023-08-02 RX ORDER — FENTANYL CITRATE 50 UG/ML
25 INJECTION, SOLUTION INTRAMUSCULAR; INTRAVENOUS EVERY 5 MIN PRN
Status: DISCONTINUED | OUTPATIENT
Start: 2023-08-02 | End: 2023-08-02 | Stop reason: HOSPADM

## 2023-08-02 RX ORDER — PROPOFOL 10 MG/ML
INJECTION, EMULSION INTRAVENOUS PRN
Status: DISCONTINUED | OUTPATIENT
Start: 2023-08-02 | End: 2023-08-02

## 2023-08-02 RX ORDER — DIAZEPAM 10 MG/2ML
2.5 INJECTION, SOLUTION INTRAMUSCULAR; INTRAVENOUS
Status: DISCONTINUED | OUTPATIENT
Start: 2023-08-02 | End: 2023-08-02 | Stop reason: HOSPADM

## 2023-08-02 RX ORDER — OXYCODONE HYDROCHLORIDE 5 MG/1
5 TABLET ORAL EVERY 4 HOURS PRN
Qty: 10 TABLET | Refills: 0 | Status: SHIPPED | OUTPATIENT
Start: 2023-08-02 | End: 2023-08-14

## 2023-08-02 RX ORDER — DEXAMETHASONE SODIUM PHOSPHATE 10 MG/ML
INJECTION, SOLUTION INTRAMUSCULAR; INTRAVENOUS PRN
Status: DISCONTINUED | OUTPATIENT
Start: 2023-08-02 | End: 2023-08-02

## 2023-08-02 RX ORDER — BUPIVACAINE HYDROCHLORIDE 2.5 MG/ML
INJECTION, SOLUTION INFILTRATION; PERINEURAL
Status: DISCONTINUED
Start: 2023-08-02 | End: 2023-08-02 | Stop reason: HOSPADM

## 2023-08-02 RX ADMIN — PROPOFOL 250 MG: 10 INJECTION, EMULSION INTRAVENOUS at 07:35

## 2023-08-02 RX ADMIN — DEXAMETHASONE SODIUM PHOSPHATE 10 MG: 10 INJECTION, SOLUTION INTRAMUSCULAR; INTRAVENOUS at 07:40

## 2023-08-02 RX ADMIN — SUGAMMADEX 200 MG: 100 INJECTION, SOLUTION INTRAVENOUS at 08:54

## 2023-08-02 RX ADMIN — ROCURONIUM BROMIDE 50 MG: 10 INJECTION, SOLUTION INTRAVENOUS at 07:35

## 2023-08-02 RX ADMIN — KETAMINE HYDROCHLORIDE 50 MG: 10 INJECTION, SOLUTION INTRAMUSCULAR; INTRAVENOUS at 07:57

## 2023-08-02 RX ADMIN — PROPOFOL 50 MCG/KG/MIN: 10 INJECTION, EMULSION INTRAVENOUS at 07:38

## 2023-08-02 RX ADMIN — OXYCODONE HYDROCHLORIDE 10 MG: 5 TABLET ORAL at 10:09

## 2023-08-02 RX ADMIN — ONDANSETRON 4 MG: 2 INJECTION INTRAMUSCULAR; INTRAVENOUS at 08:46

## 2023-08-02 RX ADMIN — SODIUM CHLORIDE, POTASSIUM CHLORIDE, SODIUM LACTATE AND CALCIUM CHLORIDE: 600; 310; 30; 20 INJECTION, SOLUTION INTRAVENOUS at 06:33

## 2023-08-02 RX ADMIN — SODIUM CHLORIDE, POTASSIUM CHLORIDE, SODIUM LACTATE AND CALCIUM CHLORIDE: 600; 310; 30; 20 INJECTION, SOLUTION INTRAVENOUS at 08:51

## 2023-08-02 RX ADMIN — MIDAZOLAM 2 MG: 1 INJECTION INTRAMUSCULAR; INTRAVENOUS at 07:24

## 2023-08-02 RX ADMIN — LIDOCAINE HYDROCHLORIDE 2 ML: 10 INJECTION, SOLUTION INFILTRATION; PERINEURAL at 07:35

## 2023-08-02 RX ADMIN — FENTANYL CITRATE 50 MCG: 50 INJECTION, SOLUTION INTRAMUSCULAR; INTRAVENOUS at 07:35

## 2023-08-02 RX ADMIN — ROCURONIUM BROMIDE 30 MG: 10 INJECTION, SOLUTION INTRAVENOUS at 07:58

## 2023-08-02 RX ADMIN — FENTANYL CITRATE 50 MCG: 50 INJECTION, SOLUTION INTRAMUSCULAR; INTRAVENOUS at 07:55

## 2023-08-02 RX ADMIN — HYDROMORPHONE HYDROCHLORIDE 0.5 MG: 1 INJECTION, SOLUTION INTRAMUSCULAR; INTRAVENOUS; SUBCUTANEOUS at 08:12

## 2023-08-02 RX ADMIN — Medication 2 G: at 07:25

## 2023-08-02 ASSESSMENT — ACTIVITIES OF DAILY LIVING (ADL)
ADLS_ACUITY_SCORE: 22
ADLS_ACUITY_SCORE: 22

## 2023-08-02 NOTE — ANESTHESIA PREPROCEDURE EVALUATION
Anesthesia Pre-Procedure Evaluation    Patient: Segundo Sellers   MRN: [id number] : 1960        Procedure : Procedure(s):  HERNIORRHAPHY, INGUINAL, ROBOT-ASSISTED, LAPAROSCOPIC, USING DA AVEL XI          Past Medical History:   Diagnosis Date     Abdominal pain 2020     Allergic rhinitis     Created by Conversion Replacement Utility updated for latest IMO load      Asthma     Created by Conversion      Benign Prostatic Hypertrophy     Created by Conversion      Current moderate episode of major depressive disorder without prior episode (H) 2020     Dyslipidemia 10/29/2021    Formatting of this note might be different from the original. Created by Conversion     Dyslipidemia     Created by Conversion      Eczema     Created by Conversion      Lethargy     Created by Conversion      Lower urinary tract symptoms 2020     Pelvic and perineal pain 2020     Slowing of urinary stream 2020      Past Surgical History:   Procedure Laterality Date     ARTHROSCOPY KNEE Left       No Known Allergies   Social History     Tobacco Use     Smoking status: Never     Smokeless tobacco: Former     Types: Chew   Substance Use Topics     Alcohol use: Yes     Alcohol/week: 2.0 standard drinks of alcohol      Wt Readings from Last 1 Encounters:   23 91.2 kg (201 lb 1.6 oz)        Anesthesia Evaluation   Pt has had prior anesthetic.         ROS/MED HX  ENT/Pulmonary:     (+)                     asthma                  Neurologic:  - neg neurologic ROS     Cardiovascular:     (+) Dyslipidemia - -   -  - -                                      METS/Exercise Tolerance:     Hematologic:  - neg hematologic  ROS     Musculoskeletal:  - neg musculoskeletal ROS     GI/Hepatic:  - neg GI/hepatic ROS     Renal/Genitourinary:  - neg Renal ROS     Endo:  - neg endo ROS     Psychiatric/Substance Use:  - neg psychiatric ROS     Infectious Disease:  - neg infectious disease ROS     Malignancy:  - neg  malignancy ROS     Other:  - neg other ROS        Physical Exam    Airway  airway exam normal      Mallampati: I   TM distance: > 3 FB   Neck ROM: full   Mouth opening: > 3 cm    Respiratory Devices and Support         Dental  no notable dental history     (+) Minor Abnormalities - some fillings, tiny chips      Cardiovascular   cardiovascular exam normal       Rhythm and rate: regular and normal     Pulmonary   pulmonary exam normal        breath sounds clear to auscultation       OUTSIDE LABS:  CBC:   Lab Results   Component Value Date    WBC 5.0 07/26/2023    WBC 5.7 03/03/2023    HGB 14.5 07/26/2023    HGB 14.3 03/03/2023    HCT 42.9 07/26/2023    HCT 40.8 03/03/2023     07/26/2023     03/03/2023     BMP:   Lab Results   Component Value Date     07/26/2023     03/03/2023    POTASSIUM 4.6 07/26/2023    POTASSIUM 4.3 03/03/2023    CHLORIDE 103 07/26/2023    CHLORIDE 102 03/03/2023    CO2 26 07/26/2023    CO2 23 03/03/2023    BUN 20.1 07/26/2023    BUN 14.9 03/03/2023    CR 0.98 07/26/2023    CR 1.05 03/03/2023     (H) 07/26/2023     (H) 03/03/2023     COAGS: No results found for: PTT, INR, FIBR  POC: No results found for: BGM, HCG, HCGS  HEPATIC: No results found for: ALBUMIN, PROTTOTAL, ALT, AST, GGT, ALKPHOS, BILITOTAL, BILIDIRECT, MARYJANE  OTHER:   Lab Results   Component Value Date    A1C 5.7 (H) 03/03/2023    ANGELA 9.4 07/26/2023       Anesthesia Plan    ASA Status:  2    NPO Status:  NPO Appropriate    Anesthesia Type: General.     - Airway: ETT   Induction: Intravenous, Propofol.   Maintenance: Balanced.        Consents    Anesthesia Plan(s) and associated risks, benefits, and realistic alternatives discussed. Questions answered and patient/representative(s) expressed understanding.     - Discussed:     - Discussed with:  Patient      - Extended Intubation/Ventilatory Support Discussed: Yes.      - Patient is DNR/DNI Status: No     Use of blood products discussed: Yes.      - Discussed with: Patient.     Postoperative Care    Pain management: Multi-modal analgesia.   PONV prophylaxis: Ondansetron (or other 5HT-3), Dexamethasone or Solumedrol, Background Propofol Infusion     Comments:              Teto Lloyd MD

## 2023-08-02 NOTE — ANESTHESIA POSTPROCEDURE EVALUATION
Patient: Segundo Sellers    Procedure: Procedure(s):  HERNIORRHAPHY, INGUINAL, ROBOT-ASSISTED, LAPAROSCOPIC, USING DA AVEL XI       Anesthesia Type:  General    Note:  Disposition: Outpatient   Postop Pain Control: Uneventful            Sign Out: Well controlled pain   PONV: No   Neuro/Psych: Uneventful            Sign Out: Acceptable/Baseline neuro status   Airway/Respiratory: Uneventful            Sign Out: Acceptable/Baseline resp. status   CV/Hemodynamics: Uneventful            Sign Out: Acceptable CV status; No obvious hypovolemia; No obvious fluid overload   Other NRE: NONE   DID A NON-ROUTINE EVENT OCCUR? No       Last vitals:  Vitals Value Taken Time   /73 08/02/23 0925   Temp 36.6  C (97.9  F) 08/02/23 0925   Pulse 47 08/02/23 0928   Resp 13 08/02/23 0928   SpO2 97 % 08/02/23 0928   Vitals shown include unvalidated device data.    Electronically Signed By: Teto Lloyd MD  August 2, 2023  10:40 AM

## 2023-08-02 NOTE — INTERVAL H&P NOTE
"I have reviewed the surgical (or preoperative) H&P that is linked to this encounter, and examined the patient. There are no significant changes    Clinical Conditions Present on Arrival:  Clinically Significant Risk Factors Present on Admission                  # Overweight: Estimated body mass index is 26.53 kg/m  as calculated from the following:    Height as of this encounter: 1.854 m (6' 1\").    Weight as of this encounter: 91.2 kg (201 lb 1.6 oz).       "

## 2023-08-02 NOTE — ANESTHESIA PROCEDURE NOTES
Airway       Patient location during procedure: OR       Procedure Start/Stop Times: 8/2/2023 7:38 AM  Staff -        Anesthesiologist:  Teto Lloyd MD       CRNA: Meghna Chirinos APRN CRNA       Other Anesthesia Staff: Jefe Conner       Performed By: SRNA and with CRNAs       Procedure performed by resident/fellow/CRNA in presence of a teaching physician.    Consent for Airway        Urgency: elective  Indications and Patient Condition       Indications for airway management: alejandro-procedural       Induction type:intravenous       Mask difficulty assessment: 1 - vent by mask    Final Airway Details       Final airway type: endotracheal airway       Successful airway: ETT - single  Endotracheal Airway Details        ETT size (mm): 8.0       Cuffed: yes       Successful intubation technique: direct laryngoscopy       DL Blade Type: [place] 2       Grade View of Cords: 1       Adjucts: stylet       Position: Right       Measured from: gums/teeth       Secured at (cm): 23       Bite block used: None    Post intubation assessment        Placement verified by: capnometry, equal breath sounds and chest rise        Number of attempts at approach: 1       Number of other approaches attempted: 0       Secured with: silk tape       Ease of procedure: easy       Dentition: Intact and Unchanged       Dental guard used and removed. Dental Guard Type: Proguard Red.    Medication(s) Administered   Medication Administration Time: 8/2/2023 7:38 AM

## 2023-08-02 NOTE — OP NOTE
General Surgery Operative Note    Date of Surgery: 8/2/2023     Surgeon: Neftaly Rm MD    Assistant: None    Preoperative Diagnosis: Right inguinal hernia    Postoperative Diagnosis: Indirect right inguinal hernia    Procedure: Robotic assisted right inguinal herniorrhaphy with mesh    EBL: 5 cc     Findings: Moderate sized right inguinal hernia with sizable cord lipoma    Indications:  Patient is a 63-year-old man who presented to my clinic with symptomatic right inguinal hernia.  After discussion of the risks and benefits of robotic repair, the patient consented to the operation.    Details of operation:  Patient was brought to the operating room placed on the table in the supine position.  General anesthesia was induced without incident.  The patient was prepped and draped in the usual sterile fashion.  A timeout for safety was performed.    I began with a supraumbilical incision and a Veress needle was introduced.  Pneumoperitoneum was established without incident.  A robotic optical trocar was placed at the site.  There was no injury with entry.  I placed 2 additional 8 mm robotic ports in the upper abdomen.  There was a clear right inguinal hernia.  I do not see any clear hernia on the left side.  Patient was placed in Trendelenburg positioning and a 10 x 15 cm ProGrip mesh and 2-0 absorbable V-Loc suture were inserted into the abdomen.  The robot was then docked.    I began by making my peritoneal flap this was carried down to the iliopubic tract.  I then began reduction of the hernia sac.  The peritoneum was fairly thin and I made 3 small rents in the peritoneum during this dissection.  Ultimately I was successful in fully reducing the hernia sac.  I then continued my dissection medially until I crossed midline and was 2 cm below Jordan's ligament.  There was no femoral defect.  There was no direct defect.  I continued to dissect the peritoneum off of the vas deferens and testicular vessels until I  had plenty of room to lay mesh flat.  I then investigated for cord lipoma which I found.  It was fairly sizable.  I dissected off of the cord structures and left it attached to the retroperitoneum but down clearly below the lip of the mesh.  At this point I placed the ProGrip mesh into place.  I then closed the peritoneal flap using the 2 0 V-Loc suture.  I used the 2-0 Vicryl to close the 3 small rents.  The abdomen was desufflated and all the ports were removed.  The robot was undocked.  The skin was closed with 4-0 Monocryl after injection of local anesthetic.  Dermabond was used as a dressing.  The patient tolerated the procedure well.  There were no immediately apparent complications.    Neftaly mR MD  General Surgeon  [facility]  Surgery [address] [address]?  Office: [phone]

## 2023-08-02 NOTE — ANESTHESIA CARE TRANSFER NOTE
Patient: Segundo Sellers    Procedure: Procedure(s):  HERNIORRHAPHY, INGUINAL, ROBOT-ASSISTED, LAPAROSCOPIC, USING DA AVEL XI       Diagnosis: Right inguinal hernia [K40.90]  Diagnosis Additional Information: No value filed.    Anesthesia Type:   General     Note:    Oropharynx: oropharynx clear of all foreign objects and spontaneously breathing  Level of Consciousness: awake  Oxygen Supplementation: face mask  Level of Supplemental Oxygen (L/min / FiO2): 8  Independent Airway: airway patency satisfactory and stable  Dentition: dentition unchanged  Vital Signs Stable: post-procedure vital signs reviewed and stable  Report to RN Given: handoff report given  Patient transferred to: PACU    Handoff Report: Identifed the Patient, Identified the Reponsible Provider, Reviewed the pertinent medical history, Discussed the surgical course, Reviewed Intra-OP anesthesia mangement and issues during anesthesia, Set expectations for post-procedure period and Allowed opportunity for questions and acknowledgement of understanding      Vitals:  Vitals Value Taken Time   /84 08/02/23 0905   Temp 36.6  C (97.9  F) 08/02/23 0905   Pulse 56 08/02/23 0905   Resp 17 08/02/23 0905   SpO2 100 % 08/02/23 0905       Electronically Signed By: AURORA Royal CRNA  August 2, 2023  9:07 AM

## 2023-08-02 NOTE — PHARMACY-ADMISSION MEDICATION HISTORY
Pharmacist Admission Medication History    Admission medication history is complete. The information provided in this note is only as accurate as the sources available at the time of the update.    Medication reconciliation/reorder completed by provider prior to medication history? No    Information Source(s): Patient and CareEverywhere/SureScripts via in-person    Pertinent Information:      Changes made to PTA medication list:  Added: None  Deleted: Lexapro, Flomax  Changed: Singulair    Medication Affordability:  Not including over the counter (OTC) medications, was there a time in the past 3 months when you did not take your medications as prescribed because of cost?: No    Allergies reviewed with patient and updates made in EHR: yes    Medication History Completed By: Ender Romo RP 8/2/2023 6:42 AM    Prior to Admission medications    Medication Sig Last Dose Taking? Auth Provider Long Term End Date   albuterol (PROAIR HFA) 90 mcg/actuation inhaler [ALBUTEROL (PROAIR HFA) 90 MCG/ACTUATION INHALER] Inhale 1-2 puffs. Every 4-6 hours as needed and as directed. Past Week Yes Provider, Historical     amphetamine-dextroamphetamine (ADDERALL XR) 10 MG 24 hr capsule TAKE 1 CAPSULE (10 MG) BY MOUTH DAILY 8/1/2023 Yes Segundo Dejesus MD     buPROPion (WELLBUTRIN XL) 150 MG 24 hr tablet Take 1 tablet (150 mg) by mouth every morning 8/1/2023 Yes Segundo Dejesus MD Yes    montelukast (SINGULAIR) 10 mg tablet Take 10 mg by mouth daily 8/1/2023 Yes Provider, Historical     oxyCODONE (ROXICODONE) 5 MG tablet Take 5 mg by mouth every 8 hours as needed for severe pain Past Week Yes Unknown, Entered By History     sildenafil (VIAGRA) 100 MG tablet Take 1 tablet (100 mg) by mouth daily as needed  Yes Amada Khalil, APRN CNP Yes    zolpidem (AMBIEN) 5 MG tablet Take 1 tablet (5 mg) by mouth nightly as needed for sleep 8/1/2023 at pm Yes Segundo Dejesus MD

## 2023-08-14 ENCOUNTER — MYC MEDICAL ADVICE (OUTPATIENT)
Dept: FAMILY MEDICINE | Facility: CLINIC | Age: 63
End: 2023-08-14
Payer: COMMERCIAL

## 2023-08-14 DIAGNOSIS — Z87.19 S/P INGUINAL HERNIA REPAIR: ICD-10-CM

## 2023-08-14 DIAGNOSIS — Z98.890 S/P INGUINAL HERNIA REPAIR: ICD-10-CM

## 2023-08-14 DIAGNOSIS — G47.00 INSOMNIA, UNSPECIFIED TYPE: ICD-10-CM

## 2023-08-14 DIAGNOSIS — G89.29 OTHER CHRONIC PAIN: ICD-10-CM

## 2023-08-14 DIAGNOSIS — F90.0 ATTENTION-DEFICIT HYPERACTIVITY DISORDER, PREDOMINANTLY INATTENTIVE TYPE: ICD-10-CM

## 2023-08-14 DIAGNOSIS — N52.9 ERECTILE DYSFUNCTION, UNSPECIFIED ERECTILE DYSFUNCTION TYPE: ICD-10-CM

## 2023-08-14 DIAGNOSIS — M48.061 SPINAL STENOSIS OF LUMBAR REGION WITHOUT NEUROGENIC CLAUDICATION: ICD-10-CM

## 2023-08-14 RX ORDER — ZOLPIDEM TARTRATE 5 MG/1
5 TABLET ORAL
Qty: 30 TABLET | Refills: 1 | Status: SHIPPED | OUTPATIENT
Start: 2023-08-14 | End: 2023-10-19

## 2023-08-14 RX ORDER — OXYCODONE HYDROCHLORIDE 5 MG/1
5 TABLET ORAL EVERY 4 HOURS PRN
Qty: 10 TABLET | Refills: 0 | Status: SHIPPED | OUTPATIENT
Start: 2023-08-14 | End: 2023-09-28

## 2023-08-14 RX ORDER — SILDENAFIL 100 MG/1
100 TABLET, FILM COATED ORAL DAILY PRN
Qty: 30 TABLET | Refills: 1 | Status: SHIPPED | OUTPATIENT
Start: 2023-08-14 | End: 2023-09-28

## 2023-08-14 NOTE — TELEPHONE ENCOUNTER
"Last Written Prescription Date:  7/26/23  Last Fill Quantity: 30,  # refills: 0   Last office visit provider:  7/26/23     Requested Prescriptions   Pending Prescriptions Disp Refills    sildenafil (VIAGRA) 100 MG tablet 30 tablet 0     Sig: Take 1 tablet (100 mg) by mouth daily as needed       Erectile Dysfuction Protocol Passed - 8/14/2023 10:45 AM        Passed - Absence of nitrates on medication list        Passed - Absence of Alpha Blockers on Med list        Passed - Recent (12 mo) or future (30 days) visit within the authorizing provider's specialty     Patient has had an office visit with the authorizing provider or a provider within the authorizing providers department within the previous 12 mos or has a future within next 30 days. See \"Patient Info\" tab in inbasket, or \"Choose Columns\" in Meds & Orders section of the refill encounter.              Passed - Medication is active on med list        Passed - Patient is age 18 or older             Brittani Ward 08/14/23 2:17 PM  "

## 2023-08-14 NOTE — TELEPHONE ENCOUNTER
Rx pended and routing to covering provider to review and advise.    Jocelin Hall, BSN, RN  [facility]

## 2023-08-15 RX ORDER — ZOLPIDEM TARTRATE 5 MG/1
5 TABLET ORAL
Qty: 30 TABLET | Refills: 0 | OUTPATIENT
Start: 2023-08-15

## 2023-08-15 RX ORDER — DEXTROAMPHETAMINE SACCHARATE, AMPHETAMINE ASPARTATE MONOHYDRATE, DEXTROAMPHETAMINE SULFATE AND AMPHETAMINE SULFATE 2.5; 2.5; 2.5; 2.5 MG/1; MG/1; MG/1; MG/1
10 CAPSULE, EXTENDED RELEASE ORAL DAILY
Qty: 30 CAPSULE | Refills: 0 | Status: SHIPPED | OUTPATIENT
Start: 2023-08-15 | End: 2023-09-17

## 2023-08-16 ENCOUNTER — OFFICE VISIT (OUTPATIENT)
Dept: SURGERY | Facility: CLINIC | Age: 63
End: 2023-08-16
Payer: COMMERCIAL

## 2023-08-16 DIAGNOSIS — Z98.890 S/P INGUINAL HERNIA REPAIR: Primary | ICD-10-CM

## 2023-08-16 DIAGNOSIS — Z87.19 S/P INGUINAL HERNIA REPAIR: Primary | ICD-10-CM

## 2023-08-16 PROCEDURE — 99024 POSTOP FOLLOW-UP VISIT: CPT | Performed by: SURGERY

## 2023-08-16 RX ORDER — OXYCODONE HYDROCHLORIDE 5 MG/1
5 TABLET ORAL EVERY 8 HOURS
Qty: 90 TABLET | Refills: 0 | Status: SHIPPED | OUTPATIENT
Start: 2023-08-16 | End: 2023-09-17

## 2023-08-16 RX ORDER — OXYCODONE HYDROCHLORIDE 5 MG/1
5 TABLET ORAL EVERY 4 HOURS PRN
Qty: 90 TABLET | Refills: 0 | OUTPATIENT
Start: 2023-08-16

## 2023-08-16 NOTE — TELEPHONE ENCOUNTER
Informed pt that Rx for Oxycodone 5 mg with qty #90 sent to [facility]. Pt would like to have rx for Ambien to [facility] as well. Patient stated that [facility] pharmacy should be his primary clinic. He only use [facility] pharmacy for his Viagra d/t special coupons.    Called [facility] and verbally gave rx written on 8/14/23 from Dr Veronica to Tamiko, pharmacist, over the phone. Will call and cancel rx at [facility].    Tristan Wright, BSN, RN  [facility]

## 2023-08-16 NOTE — PROGRESS NOTES
General surgery clinic follow-up visit    Patient is a 63-year-old man who is 2 weeks out from a right inguinal hernia repair with the robot.  He is done very well.  He has no complaints.  He has minimal swelling that is now improved.    On exam his incisions are well-healed.  I cleaned off the glue.  His right groin feels completely normal without any evidence of hernia recurrence.  He has no tenderness.    Patient is recovering as expected after his right inguinal hernia repair with the robot.  I encouraged him to continue to take it easy for the next 6 weeks as far as heavy lifting goes.  He can return to normal activities otherwise.  He can call if any additional concerns arise.    Neftaly Rm MD  General Surgeon  [facility]  Surgery [facility] - [address] [address]?  Office: [phone]

## 2023-08-16 NOTE — LETTER
8/16/2023         RE: Segundo Sellers  [address] [place] [address]   [place] [address]        Dear Colleague,    Thank you for referring your patient, Segundo Sellers, to the [facility] SURGERY CLINIC AND BARIATRICS CARE [place]. Please see a copy of my visit note below.    General surgery clinic follow-up visit    Patient is a 63-year-old man who is 2 weeks out from a right inguinal hernia repair with the robot.  He is done very well.  He has no complaints.  He has minimal swelling that is now improved.    On exam his incisions are well-healed.  I cleaned off the glue.  His right groin feels completely normal without any evidence of hernia recurrence.  He has no tenderness.    Patient is recovering as expected after his right inguinal hernia repair with the robot.  I encouraged him to continue to take it easy for the next 6 weeks as far as heavy lifting goes.  He can return to normal activities otherwise.  He can call if any additional concerns arise.    Neftaly Rm MD  General Surgeon  [facility]  Surgery [facility] - [address]  [place] [address]?  Office: [phone]      Again, thank you for allowing me to participate in the care of your patient.        Sincerely,        Neftaly Rm MD

## 2023-08-16 NOTE — TELEPHONE ENCOUNTER
"Patient was seen today with surgeon and noted not taking taking oxycodone 5 mg table today:        Per surgeon's notes from today:  \"Patient is a 63-year-old man who is 2 weeks out from a right inguinal hernia repair with the robot.  He is done very well.  He has no complaints.  He has minimal swelling that is now improved.     On exam his incisions are well-healed.  I cleaned off the glue.  His right groin feels completely normal without any evidence of hernia recurrence.  He has no tenderness.\"    Previous rx for Oxycodone 5 mg Q8H PRN prescribed by Dr Dejesus was for qty #90 to [facility] for  \"Spinal stenosis of lumbar region without neurogenic claudication\" and \"Other chronic pain.\"    Will route to PCP and covering provider to review.    Tristan Wright, GENTRYN, RN  [facility]          "

## 2023-08-16 NOTE — TELEPHONE ENCOUNTER
Called and informed Blanca, pharmacist at [facility] to cancel Ambien order as it was called into his preferred pharmacy ([facility]). Blanca stated she will cancel that order.    Tristan Wright, GENTRYN, RN  [facility]

## 2023-08-16 NOTE — TELEPHONE ENCOUNTER
Routing to covering provider to review and advise. Patient is requesting for qty #90, not 10 as prescribed on 8/14/23.    Please advise.    Routing refill request to provider for review/approval because:  Drug not on the [facility] refill protocol- controlled substance    Please fill in with dates, using N/A if not applicable:   Urine Drug Screen in the last 12 months - 5/22/23   Controlled Substance Agreement in the last 12 months (Must be scanned to the ControlledSubstance Agreement-Opioid document type) See Controlled Substance Agreement Opioid Procedure for workflow- 5/22/23   PHQ-9 completed in the last 12 months -       3/3/2023     9:11 AM 4/11/2023     4:26 PM 5/15/2023     9:50 AM   PHQ   PHQ-9 Total Score 14 10 5   Q9: Thoughts of better off dead/self-harm past 2 weeks Not at all Not at all Not at all        ANTONIO-7 completed in the last 12 months-       8/28/2022     5:00 PM 4/11/2023     4:30 PM 5/15/2023     9:50 AM   ANTONIO-7 SCORE   Total Score 6 (mild anxiety) 8 (mild anxiety) 7 (mild anxiety)   Total Score 6 8 7           Pending Prescriptions:                       Disp   Refills    oxyCODONE (ROXICODONE) 5 MG tablet        90 tab*0            Sig: Take 1 tablet (5 mg) by mouth every 4 hours as           needed for moderate to severe pain    Signed Prescriptions:                        Disp   Refills    oxyCODONE (ROXICODONE) 5 MG tablet         10 tab*0        Sig: Take 1 tablet (5 mg) by mouth every 4 hours as needed           for moderate to severe pain  Authorizing Provider: CAMMY MONTALVO    zolpidem (AMBIEN) 5 MG tablet              30 tab*1        Sig: Take 1 tablet (5 mg) by mouth nightly as needed for           sleep  Authorizing Provider: CAMMY MONTALVO    Last office visitwith prescribing provider: 7/26/2023   Future Office Visit:    Appointments in Next Year      Aug 16, 2023  1:40 PM  (Arrive by 1:25 PM)  Post-Op Visit with Neftaly Rm MD  [facility]  [facility] ([facility] ) [phone]            GENTRY BaiN, RN  [facility]

## 2023-08-18 RX ORDER — OXYCODONE HYDROCHLORIDE 5 MG/1
5 TABLET ORAL EVERY 4 HOURS PRN
Qty: 10 TABLET | Refills: 0 | OUTPATIENT
Start: 2023-08-18

## 2023-09-12 ENCOUNTER — TRANSFERRED RECORDS (OUTPATIENT)
Dept: HEALTH INFORMATION MANAGEMENT | Facility: CLINIC | Age: 63
End: 2023-09-12
Payer: COMMERCIAL

## 2023-09-13 ENCOUNTER — OFFICE VISIT (OUTPATIENT)
Dept: SURGERY | Facility: CLINIC | Age: 63
End: 2023-09-13
Payer: COMMERCIAL

## 2023-09-13 DIAGNOSIS — Z98.890 S/P INGUINAL HERNIA REPAIR: Primary | ICD-10-CM

## 2023-09-13 DIAGNOSIS — Z87.19 S/P INGUINAL HERNIA REPAIR: Primary | ICD-10-CM

## 2023-09-13 PROCEDURE — 99024 POSTOP FOLLOW-UP VISIT: CPT | Performed by: SURGERY

## 2023-09-13 NOTE — LETTER
9/13/2023         RE: Segundo Sellers  [address] [place] [address] [address]  [place] [address]        Dear Colleague,    Thank you for referring your patient, Segundo Sellers, to the [facility] SURGERY CLINIC AND BARIATRICS CARE [place]. Please see a copy of my visit note below.    General Surgery Clinic follow up visit    Patient is a 63 year old man who is about 6 weeks out from a robotic assisted right inguinal hernia repair. He is doing well but has noticed a small lump in his right groin. He denies any pain.     On exam he has a firm mass along his cord structures. There is no bulging or change with valsalva.     Patient has a small seroma following his robotic right inguinal hernia repair. I advised him that this will likely resorb over time. If it does not or becomes more problematic, we can aspirate it. I would wait at least another 2 months before doing this.     He is free perform any activity he would like at this point.     Follow up if needed.     Neftaly Rm MD  General Surgeon  [facility]  Surgery Clinic - [place] [id number] [facility] [address]  [place] [address]  Office: [phone]        Again, thank you for allowing me to participate in the care of your patient.        Sincerely,        Neftaly Rm MD

## 2023-09-13 NOTE — PROGRESS NOTES
General Surgery Clinic follow up visit    Patient is a 63 year old man who is about 6 weeks out from a robotic assisted right inguinal hernia repair. He is doing well but has noticed a small lump in his right groin. He denies any pain.     On exam he has a firm mass along his cord structures. There is no bulging or change with valsalva.     Patient has a small seroma following his robotic right inguinal hernia repair. I advised him that this will likely resorb over time. If it does not or becomes more problematic, we can aspirate it. I would wait at least another 2 months before doing this.     He is free perform any activity he would like at this point.     Follow up if needed.     Neftaly Rm MD  General Surgeon  [facility]  Surgery [facility] - [address] [address]  Office: [phone]

## 2023-09-16 DIAGNOSIS — F90.0 ATTENTION-DEFICIT HYPERACTIVITY DISORDER, PREDOMINANTLY INATTENTIVE TYPE: ICD-10-CM

## 2023-09-16 DIAGNOSIS — M48.061 SPINAL STENOSIS OF LUMBAR REGION WITHOUT NEUROGENIC CLAUDICATION: ICD-10-CM

## 2023-09-16 DIAGNOSIS — G89.29 OTHER CHRONIC PAIN: ICD-10-CM

## 2023-09-17 RX ORDER — DEXTROAMPHETAMINE SACCHARATE, AMPHETAMINE ASPARTATE MONOHYDRATE, DEXTROAMPHETAMINE SULFATE AND AMPHETAMINE SULFATE 2.5; 2.5; 2.5; 2.5 MG/1; MG/1; MG/1; MG/1
10 CAPSULE, EXTENDED RELEASE ORAL DAILY
Qty: 30 CAPSULE | Refills: 0 | Status: SHIPPED | OUTPATIENT
Start: 2023-09-17 | End: 2023-10-19

## 2023-09-17 RX ORDER — OXYCODONE HYDROCHLORIDE 5 MG/1
5 TABLET ORAL EVERY 8 HOURS
Qty: 90 TABLET | Refills: 0 | Status: SHIPPED | OUTPATIENT
Start: 2023-09-17 | End: 2023-10-19

## 2023-09-26 ASSESSMENT — ASTHMA QUESTIONNAIRES: ACT_TOTALSCORE: 23

## 2023-09-28 ENCOUNTER — OFFICE VISIT (OUTPATIENT)
Dept: FAMILY MEDICINE | Facility: CLINIC | Age: 63
End: 2023-09-28
Payer: COMMERCIAL

## 2023-09-28 VITALS
RESPIRATION RATE: 14 BRPM | SYSTOLIC BLOOD PRESSURE: 133 MMHG | HEIGHT: 73 IN | TEMPERATURE: 98.7 F | HEART RATE: 64 BPM | OXYGEN SATURATION: 98 % | WEIGHT: 213.3 LBS | DIASTOLIC BLOOD PRESSURE: 84 MMHG | BODY MASS INDEX: 28.27 KG/M2

## 2023-09-28 DIAGNOSIS — N50.89 GENITAL LESION, MALE: ICD-10-CM

## 2023-09-28 DIAGNOSIS — F90.0 ATTENTION DEFICIT HYPERACTIVITY DISORDER (ADHD), PREDOMINANTLY INATTENTIVE TYPE: Primary | ICD-10-CM

## 2023-09-28 PROCEDURE — 86780 TREPONEMA PALLIDUM: CPT | Performed by: NURSE PRACTITIONER

## 2023-09-28 PROCEDURE — 36415 COLL VENOUS BLD VENIPUNCTURE: CPT | Performed by: NURSE PRACTITIONER

## 2023-09-28 PROCEDURE — 99214 OFFICE O/P EST MOD 30 MIN: CPT | Mod: 24 | Performed by: NURSE PRACTITIONER

## 2023-09-28 PROCEDURE — 86696 HERPES SIMPLEX TYPE 2 TEST: CPT | Performed by: NURSE PRACTITIONER

## 2023-09-28 PROCEDURE — 90471 IMMUNIZATION ADMIN: CPT | Performed by: NURSE PRACTITIONER

## 2023-09-28 PROCEDURE — 86695 HERPES SIMPLEX TYPE 1 TEST: CPT | Performed by: NURSE PRACTITIONER

## 2023-09-28 PROCEDURE — 90682 RIV4 VACC RECOMBINANT DNA IM: CPT | Performed by: NURSE PRACTITIONER

## 2023-09-28 RX ORDER — TADALAFIL 20 MG/1
1 TABLET ORAL DAILY PRN
COMMUNITY
Start: 2023-09-14 | End: 2023-09-28

## 2023-09-28 RX ORDER — BUPROPION HYDROCHLORIDE 300 MG/1
300 TABLET ORAL EVERY MORNING
Qty: 90 TABLET | Refills: 0 | Status: SHIPPED | OUTPATIENT
Start: 2023-09-28 | End: 2023-10-23

## 2023-09-28 RX ORDER — VALACYCLOVIR HYDROCHLORIDE 1 G/1
1000 TABLET, FILM COATED ORAL 2 TIMES DAILY
Qty: 14 TABLET | Refills: 0 | Status: SHIPPED | OUTPATIENT
Start: 2023-09-28 | End: 2023-12-11

## 2023-09-28 ASSESSMENT — PAIN SCALES - GENERAL: PAINLEVEL: MODERATE PAIN (5)

## 2023-09-28 NOTE — PROGRESS NOTES
Assessment and Plan:     Attention deficit hyperactivity disorder (ADHD), predominantly inattentive type  This is uncontrolled.  Discussed treatment options.  We will increase bupropion XL to 300 mg daily.  Educated on its indications and side effects.  He is to follow-up with Dr. Dejesus in 1 month.  - buPROPion (WELLBUTRIN XL) 300 MG 24 hr tablet  Dispense: 90 tablet; Refill: 0    Genital lesion, male  Lesions are suspicious for herpes.  He refused a swab of this today. Will evaluate for antibodies.  We will also rule out syphilis.  Considering he developed a new lesion, will treat with valacyclovir 1000 mg twice daily for 7 days.  Educated on its indications and side effects.  Further plans pending the results.  He is content with the plan.  - Herpes Simplex Virus 1 and 2 IgG  - Treponema Abs w Reflex to RPR and Titer  - Herpes Simplex Virus 1 and 2 IgG  - Treponema Abs w Reflex to RPR and Titer  - valACYclovir (VALTREX) 1000 mg tablet  Dispense: 14 tablet; Refill: 0        Subjective:     Segundo is a 63-year-old male presenting to the clinic with concerns of a rash present on his penis.  Patient noticed the rash develop 3 weeks ago.  Patient states it is slightly uncomfortable.  He feels as though the rash is not healing.  He had another lesion develop yesterday.  His wife passed away last year.  He is single and is sexually active.  He has applied triamcinolone cream and has been using alcohol wipes with no relief.  He denies any penile discharge.  He has no concerns for STDs.  Patient has ADHD and is taking Adderall XR 10 mg daily and bupropion  mg daily.  He is interested in increasing the dose of the Wellbutrin.  He is feeling less motivated.  He has had difficulty concentrating.    Reviewof Systems: A complete 14 point review of systems was obtained and is negative or as stated in the history of present illness.    Social History     Socioeconomic History    Marital status:      Spouse name: Not  on file    Number of children: Not on file    Years of education: Not on file    Highest education level: Not on file   Occupational History    Not on file   Tobacco Use    Smoking status: Never     Passive exposure: Never    Smokeless tobacco: Former     Types: Chew   Vaping Use    Vaping Use: Never used   Substance and Sexual Activity    Alcohol use: Yes     Alcohol/week: 2.0 standard drinks of alcohol    Drug use: No    Sexual activity: Yes     Partners: Female     Comment:    Other Topics Concern    Not on file   Social History Narrative    Not on file     Social Determinants of Health     Financial Resource Strain: Low Risk  (9/26/2023)    Financial Resource Strain     Within the past 12 months, have you or your family members you live with been unable to get utilities (heat, electricity) when it was really needed?: No   Food Insecurity: Low Risk  (9/26/2023)    Food Insecurity     Within the past 12 months, did you worry that your food would run out before you got money to buy more?: No     Within the past 12 months, did the food you bought just not last and you didn t have money to get more?: No   Transportation Needs: Low Risk  (9/26/2023)    Transportation Needs     Within the past 12 months, has lack of transportation kept you from medical appointments, getting your medicines, non-medical meetings or appointments, work, or from getting things that you need?: No   Physical Activity: Not on file   Stress: Not on file   Social Connections: Not on file   Interpersonal Safety: Low Risk  (9/28/2023)    Interpersonal Safety     Do you feel physically and emotionally safe where you currently live?: Yes     Within the past 12 months, have you been hit, slapped, kicked or otherwise physically hurt by someone?: No     Within the past 12 months, have you been humiliated or emotionally abused in other ways by your partner or ex-partner?: No   Housing Stability: Low Risk  (9/26/2023)    Housing Stability     Do  "you have housing? : Yes     Are you worried about losing your housing?: No       Active Ambulatory Problems     Diagnosis Date Noted    Allergic Rhinitis     Eczema     Dyslipidemia     Asthma     Benign Prostatic Hypertrophy     Lethargy     Impaired Fasting Glucose     Current moderate episode of major depressive disorder without prior episode (H) 03/16/2020    Slowing of urinary stream 02/28/2020    Pelvic and perineal pain 04/22/2020    Dyslipidemia 10/29/2021    Abdominal pain 02/28/2020    Lower urinary tract symptoms 02/28/2020     Resolved Ambulatory Problems     Diagnosis Date Noted    No Resolved Ambulatory Problems     No Additional Past Medical History       Family History   Problem Relation Age of Onset    No Known Problems Mother     Hypertension Father        Objective:     /84   Pulse 64   Temp 98.7  F (37.1  C)   Resp 14   Ht 1.854 m (6' 1\")   Wt 96.8 kg (213 lb 4.8 oz)   SpO2 98%   BMI 28.14 kg/m      Patient is alert, in no obvious distress.   Skin: Warm, dry.    Lungs:  Clear to auscultation. Respirations even and unlabored.  No wheezing or rales noted.   Heart:  Regular rate and rhythm.  No murmurs.   :  Patient has a cluster of crater-like sores with scabs present on the shaft of his penis.  He has two similar sores present within his mid pubic region.             Answers submitted by the patient for this visit:  General Questionnaire (Submitted on 9/26/2023)  Chief Complaint: Chronic problems general questions HPI Form  How many servings of fruits and vegetables do you eat daily?: 2-3  On average, how many sweetened beverages do you drink each day (Examples: soda, juice, sweet tea, etc.  Do NOT count diet or artificially sweetened beverages)?: 2  How many minutes a day do you exercise enough to make your heart beat faster?: 30 to 60  How many days a week do you exercise enough to make your heart beat faster?: 4  How many days per week do you miss taking your medication?: " 0  General Concern (Submitted on 9/26/2023)  Chief Complaint: Chronic problems general questions HPI Form  What is the reason for your visit today?: Rash on genitals  When did your symptoms begin?: 1-2 weeks ago  What are your symptoms?: Won t clear up  How would you describe these symptoms?: Moderate  Are your symptoms:: Staying the same  Have you had these symptoms before?: No

## 2023-09-29 LAB
HSV1 IGG SERPL QL IA: 33.5 INDEX
HSV1 IGG SERPL QL IA: ABNORMAL
HSV2 IGG SERPL QL IA: 0.85 INDEX
HSV2 IGG SERPL QL IA: ABNORMAL
T PALLIDUM AB SER QL: NONREACTIVE

## 2023-10-19 DIAGNOSIS — F90.0 ATTENTION-DEFICIT HYPERACTIVITY DISORDER, PREDOMINANTLY INATTENTIVE TYPE: ICD-10-CM

## 2023-10-19 DIAGNOSIS — M48.061 SPINAL STENOSIS OF LUMBAR REGION WITHOUT NEUROGENIC CLAUDICATION: ICD-10-CM

## 2023-10-19 DIAGNOSIS — G47.00 INSOMNIA, UNSPECIFIED TYPE: ICD-10-CM

## 2023-10-19 DIAGNOSIS — G89.29 OTHER CHRONIC PAIN: ICD-10-CM

## 2023-10-19 RX ORDER — ZOLPIDEM TARTRATE 5 MG/1
5 TABLET ORAL
Qty: 30 TABLET | Refills: 0 | Status: SHIPPED | OUTPATIENT
Start: 2023-10-19 | End: 2023-12-06

## 2023-10-19 RX ORDER — DEXTROAMPHETAMINE SACCHARATE, AMPHETAMINE ASPARTATE MONOHYDRATE, DEXTROAMPHETAMINE SULFATE AND AMPHETAMINE SULFATE 2.5; 2.5; 2.5; 2.5 MG/1; MG/1; MG/1; MG/1
10 CAPSULE, EXTENDED RELEASE ORAL DAILY
Qty: 30 CAPSULE | Refills: 0 | Status: SHIPPED | OUTPATIENT
Start: 2023-10-19 | End: 2023-11-16

## 2023-10-19 RX ORDER — OXYCODONE HYDROCHLORIDE 5 MG/1
5 TABLET ORAL EVERY 8 HOURS
Qty: 90 TABLET | Refills: 0 | Status: SHIPPED | OUTPATIENT
Start: 2023-10-19 | End: 2023-11-16

## 2023-10-23 DIAGNOSIS — F90.0 ATTENTION-DEFICIT HYPERACTIVITY DISORDER, PREDOMINANTLY INATTENTIVE TYPE: Primary | ICD-10-CM

## 2023-10-23 RX ORDER — BUPROPION HYDROCHLORIDE 150 MG/1
150 TABLET ORAL EVERY MORNING
Qty: 90 TABLET | Refills: 3 | Status: SHIPPED | OUTPATIENT
Start: 2023-10-23 | End: 2023-12-11 | Stop reason: SINTOL

## 2023-11-16 DIAGNOSIS — M48.061 SPINAL STENOSIS OF LUMBAR REGION WITHOUT NEUROGENIC CLAUDICATION: ICD-10-CM

## 2023-11-16 DIAGNOSIS — F90.0 ATTENTION-DEFICIT HYPERACTIVITY DISORDER, PREDOMINANTLY INATTENTIVE TYPE: ICD-10-CM

## 2023-11-16 DIAGNOSIS — G89.29 OTHER CHRONIC PAIN: ICD-10-CM

## 2023-11-16 RX ORDER — OXYCODONE HYDROCHLORIDE 5 MG/1
5 TABLET ORAL EVERY 8 HOURS
Qty: 90 TABLET | Refills: 0 | Status: SHIPPED | OUTPATIENT
Start: 2023-11-16 | End: 2023-12-06

## 2023-11-16 RX ORDER — DEXTROAMPHETAMINE SACCHARATE, AMPHETAMINE ASPARTATE MONOHYDRATE, DEXTROAMPHETAMINE SULFATE AND AMPHETAMINE SULFATE 2.5; 2.5; 2.5; 2.5 MG/1; MG/1; MG/1; MG/1
10 CAPSULE, EXTENDED RELEASE ORAL DAILY
Qty: 30 CAPSULE | Refills: 0 | Status: SHIPPED | OUTPATIENT
Start: 2023-11-16 | End: 2023-12-06

## 2023-11-17 ENCOUNTER — TRANSFERRED RECORDS (OUTPATIENT)
Dept: HEALTH INFORMATION MANAGEMENT | Facility: CLINIC | Age: 63
End: 2023-11-17
Payer: COMMERCIAL

## 2023-12-06 ENCOUNTER — MYC MEDICAL ADVICE (OUTPATIENT)
Dept: FAMILY MEDICINE | Facility: CLINIC | Age: 63
End: 2023-12-06
Payer: COMMERCIAL

## 2023-12-06 DIAGNOSIS — G47.00 INSOMNIA, UNSPECIFIED TYPE: ICD-10-CM

## 2023-12-06 DIAGNOSIS — F90.0 ATTENTION-DEFICIT HYPERACTIVITY DISORDER, PREDOMINANTLY INATTENTIVE TYPE: ICD-10-CM

## 2023-12-06 DIAGNOSIS — M48.061 SPINAL STENOSIS OF LUMBAR REGION WITHOUT NEUROGENIC CLAUDICATION: ICD-10-CM

## 2023-12-06 DIAGNOSIS — G89.29 OTHER CHRONIC PAIN: ICD-10-CM

## 2023-12-06 RX ORDER — OXYCODONE HYDROCHLORIDE 5 MG/1
5 TABLET ORAL EVERY 8 HOURS
Qty: 90 TABLET | Refills: 0 | Status: SHIPPED | OUTPATIENT
Start: 2023-12-14 | End: 2023-12-11

## 2023-12-06 RX ORDER — DEXTROAMPHETAMINE SACCHARATE, AMPHETAMINE ASPARTATE MONOHYDRATE, DEXTROAMPHETAMINE SULFATE AND AMPHETAMINE SULFATE 2.5; 2.5; 2.5; 2.5 MG/1; MG/1; MG/1; MG/1
10 CAPSULE, EXTENDED RELEASE ORAL DAILY
Qty: 30 CAPSULE | Refills: 0 | Status: SHIPPED | OUTPATIENT
Start: 2023-12-14 | End: 2024-05-24 | Stop reason: DRUGHIGH

## 2023-12-06 RX ORDER — ZOLPIDEM TARTRATE 5 MG/1
5 TABLET ORAL
Qty: 30 TABLET | Refills: 0 | Status: SHIPPED | OUTPATIENT
Start: 2023-12-06 | End: 2024-01-09

## 2023-12-06 NOTE — TELEPHONE ENCOUNTER
"Routing refill request to provider for review/approval because:  Drug not on the [facility] refill protocol- controlled substance. Rx pended with \"OK to early refill,\" so please advise if appropriate.    Please fill in with dates, using N/A if not applicable:   Urine Drug Screen in the last 12 months - 5/22/23   Controlled Substance Agreement in the last 12 months (Must be scanned to the ControlledSubstance Agreement-Opioid document type) See Controlled Substance Agreement Opioid Procedure for workflow- 5/22/23   PHQ-9 completed in the last 12 months -       3/3/2023     9:11 AM 4/11/2023     4:26 PM 5/15/2023     9:50 AM   PHQ   PHQ-9 Total Score 14 10 5   Q9: Thoughts of better off dead/self-harm past 2 weeks Not at all Not at all Not at all        ANTONIO-7 completed in the last 12 months-       8/28/2022     5:00 PM 4/11/2023     4:30 PM 5/15/2023     9:50 AM   ANTONIO-7 SCORE   Total Score 6 (mild anxiety) 8 (mild anxiety) 7 (mild anxiety)   Total Score 6 8 7       Pending Prescriptions:                       Disp   Refills    amphetamine-dextroamphetamine (ADDERALL X*30 cap*0            Sig: Take 1 capsule (10 mg) by mouth daily    oxyCODONE (ROXICODONE) 5 MG tablet        90 tab*0            Sig: Take 1 tablet (5 mg) by mouth every 8 hours    zolpidem (AMBIEN) 5 MG tablet             30 tab*0            Sig: Take 1 tablet (5 mg) by mouth nightly as needed           for sleep    Last office visitwith prescribing provider: 9/28/2023   Future Office Visit:   Appointments in Next Year      Dec 11, 2023  3:00 PM  (Arrive by 2:40 PM)  Provider Visit with Segundo Dejesus MD  [facility] ([facility]) [phone]             "

## 2023-12-10 ASSESSMENT — PATIENT HEALTH QUESTIONNAIRE - PHQ9: SUM OF ALL RESPONSES TO PHQ QUESTIONS 1-9: 6

## 2023-12-10 ASSESSMENT — ANXIETY QUESTIONNAIRES
1. FEELING NERVOUS, ANXIOUS, OR ON EDGE: SEVERAL DAYS
7. FEELING AFRAID AS IF SOMETHING AWFUL MIGHT HAPPEN: SEVERAL DAYS
IF YOU CHECKED OFF ANY PROBLEMS ON THIS QUESTIONNAIRE, HOW DIFFICULT HAVE THESE PROBLEMS MADE IT FOR YOU TO DO YOUR WORK, TAKE CARE OF THINGS AT HOME, OR GET ALONG WITH OTHER PEOPLE: NOT DIFFICULT AT ALL
3. WORRYING TOO MUCH ABOUT DIFFERENT THINGS: SEVERAL DAYS
6. BECOMING EASILY ANNOYED OR IRRITABLE: SEVERAL DAYS
GAD7 TOTAL SCORE: 7
5. BEING SO RESTLESS THAT IT IS HARD TO SIT STILL: SEVERAL DAYS
2. NOT BEING ABLE TO STOP OR CONTROL WORRYING: SEVERAL DAYS
4. TROUBLE RELAXING: SEVERAL DAYS

## 2023-12-11 ENCOUNTER — OFFICE VISIT (OUTPATIENT)
Dept: FAMILY MEDICINE | Facility: CLINIC | Age: 63
End: 2023-12-11
Payer: COMMERCIAL

## 2023-12-11 VITALS
RESPIRATION RATE: 18 BRPM | HEIGHT: 73 IN | SYSTOLIC BLOOD PRESSURE: 130 MMHG | DIASTOLIC BLOOD PRESSURE: 78 MMHG | TEMPERATURE: 98.6 F | WEIGHT: 216 LBS | OXYGEN SATURATION: 99 % | BODY MASS INDEX: 28.63 KG/M2 | HEART RATE: 72 BPM

## 2023-12-11 DIAGNOSIS — G89.29 OTHER CHRONIC PAIN: ICD-10-CM

## 2023-12-11 DIAGNOSIS — M48.061 SPINAL STENOSIS OF LUMBAR REGION WITHOUT NEUROGENIC CLAUDICATION: ICD-10-CM

## 2023-12-11 DIAGNOSIS — F90.0 ATTENTION-DEFICIT HYPERACTIVITY DISORDER, PREDOMINANTLY INATTENTIVE TYPE: Primary | ICD-10-CM

## 2023-12-11 PROCEDURE — 90471 IMMUNIZATION ADMIN: CPT | Performed by: FAMILY MEDICINE

## 2023-12-11 PROCEDURE — 99214 OFFICE O/P EST MOD 30 MIN: CPT | Mod: 25 | Performed by: FAMILY MEDICINE

## 2023-12-11 PROCEDURE — 90480 ADMN SARSCOV2 VAC 1/ONLY CMP: CPT | Performed by: FAMILY MEDICINE

## 2023-12-11 PROCEDURE — 91320 SARSCV2 VAC 30MCG TRS-SUC IM: CPT | Performed by: FAMILY MEDICINE

## 2023-12-11 PROCEDURE — 90678 RSV VACC PREF BIVALENT IM: CPT | Performed by: FAMILY MEDICINE

## 2023-12-11 RX ORDER — OXYCODONE HYDROCHLORIDE 5 MG/1
5 TABLET ORAL EVERY 6 HOURS PRN
Qty: 120 TABLET | Refills: 0 | Status: SHIPPED | OUTPATIENT
Start: 2023-12-11 | End: 2024-01-09

## 2023-12-11 RX ORDER — DEXTROAMPHETAMINE SACCHARATE, AMPHETAMINE ASPARTATE MONOHYDRATE, DEXTROAMPHETAMINE SULFATE AND AMPHETAMINE SULFATE 3.75; 3.75; 3.75; 3.75 MG/1; MG/1; MG/1; MG/1
15 CAPSULE, EXTENDED RELEASE ORAL DAILY
Qty: 30 CAPSULE | Refills: 0 | Status: SHIPPED | OUTPATIENT
Start: 2023-12-11 | End: 2024-01-09

## 2023-12-11 ASSESSMENT — ANXIETY QUESTIONNAIRES: GAD7 TOTAL SCORE: 7

## 2023-12-11 ASSESSMENT — PATIENT HEALTH QUESTIONNAIRE - PHQ9
SUM OF ALL RESPONSES TO PHQ QUESTIONS 1-9: 6
10. IF YOU CHECKED OFF ANY PROBLEMS, HOW DIFFICULT HAVE THESE PROBLEMS MADE IT FOR YOU TO DO YOUR WORK, TAKE CARE OF THINGS AT HOME, OR GET ALONG WITH OTHER PEOPLE: NOT DIFFICULT AT ALL

## 2023-12-11 ASSESSMENT — PAIN SCALES - GENERAL: PAINLEVEL: MODERATE PAIN (5)

## 2023-12-11 NOTE — PROGRESS NOTES
"Segundo Sellers  /78   Pulse 72   Temp 98.6  F (37  C)   Resp 18   Ht 1.854 m (6' 1\")   Wt 98 kg (216 lb)   SpO2 99%   BMI 28.50 kg/m       Assessment/Plan:                Segundo was seen today for recheck medication and mental health problem.    Diagnoses and all orders for this visit:    Attention-deficit hyperactivity disorder, predominantly inattentive type  -     amphetamine-dextroamphetamine (ADDERALL XR) 15 MG 24 hr capsule; Take 1 capsule (15 mg) by mouth daily    Other chronic pain  -     oxyCODONE (ROXICODONE) 5 MG tablet; Take 1 tablet (5 mg) by mouth every 6 hours as needed for severe pain    Spinal stenosis of lumbar region without neurogenic claudication  -     oxyCODONE (ROXICODONE) 5 MG tablet; Take 1 tablet (5 mg) by mouth every 6 hours as needed for severe pain    Other orders  -     RSV VACCINE (ABRYSVO)  -     COVID-19 12+ (2023-24) (PFIZER)         DISCUSSION  See orders above,  Subjective:     HPI:    Segundo Sellres is a 63 year old male is here today to follow-up on attention deficit disorder and chronic pain.    His chronic pain stemming from lumbar stenosis, he has chronic back pain.  He also has osteoarthritis in the lower extremities has had joint replacement surgery.  I did help with pain control to some extent.  He is on oxycodone.  Today we discussed increasing the dose slightly to gain better pain control.  He does report having pain that is causing dysfunction on a regular basis.  There is no evidence of misuse or diversion of current medication no significant side effects of current medication.  We discussed increasing by 1 pill/day.  We will arrange for short-term follow-up to assess progress.    His attention deficit disorder.  He has benefited from stimulant medication.  Medication does not cause side effects.  Medication allows for restoration of normal function without causing significant problems.  He had been on higher doses previously we had reduced the " dose due to concerns of potential side effects.  He is noticing his got more used to the medication at the lower dose that is less effective.  We discussed increasing slightly although we will still remain below previous levels.  We discussed all of these medication changes in great detail including the potential dangers associate with these medications including but not limited to sedation, respiratory depression, dependence, constipation, tachycardia, anxiety, overstimulation and feeling jittery.    ROS:  Complete review of systems is obtained.  Other than the specific considerations noted above complete review of systems is negative.          Objective:   Medications:  Current Outpatient Medications   Medication    albuterol (PROAIR HFA) 90 mcg/actuation inhaler    [START ON 12/14/2023] amphetamine-dextroamphetamine (ADDERALL XR) 10 MG 24 hr capsule    amphetamine-dextroamphetamine (ADDERALL XR) 15 MG 24 hr capsule    montelukast (SINGULAIR) 10 mg tablet    oxyCODONE (ROXICODONE) 5 MG tablet    zolpidem (AMBIEN) 5 MG tablet     No current facility-administered medications for this visit.        Allergies:   No Known Allergies     Social History     Socioeconomic History    Marital status:      Spouse name: Not on file    Number of children: Not on file    Years of education: Not on file    Highest education level: Not on file   Occupational History    Not on file   Tobacco Use    Smoking status: Never     Passive exposure: Never    Smokeless tobacco: Former     Types: Chew   Vaping Use    Vaping Use: Never used   Substance and Sexual Activity    Alcohol use: Yes     Alcohol/week: 2.0 standard drinks of alcohol    Drug use: No    Sexual activity: Yes     Partners: Female     Comment:    Other Topics Concern    Not on file   Social History Narrative    Not on file     Social Determinants of Health     Financial Resource Strain: Low Risk  (12/10/2023)    Financial Resource Strain     Within the past 12  months, have you or your family members you live with been unable to get utilities (heat, electricity) when it was really needed?: No   Food Insecurity: Low Risk  (12/10/2023)    Food Insecurity     Within the past 12 months, did you worry that your food would run out before you got money to buy more?: No     Within the past 12 months, did the food you bought just not last and you didn t have money to get more?: No   Transportation Needs: Low Risk  (12/10/2023)    Transportation Needs     Within the past 12 months, has lack of transportation kept you from medical appointments, getting your medicines, non-medical meetings or appointments, work, or from getting things that you need?: No   Physical Activity: Not on file   Stress: Not on file   Social Connections: Not on file   Interpersonal Safety: Low Risk  (9/28/2023)    Interpersonal Safety     Do you feel physically and emotionally safe where you currently live?: Yes     Within the past 12 months, have you been hit, slapped, kicked or otherwise physically hurt by someone?: No     Within the past 12 months, have you been humiliated or emotionally abused in other ways by your partner or ex-partner?: No   Housing Stability: Low Risk  (12/10/2023)    Housing Stability     Do you have housing? : Yes     Are you worried about losing your housing?: No       Family History   Problem Relation Age of Onset    No Known Problems Mother     Hypertension Father         Most Recent Immunizations   Administered Date(s) Administered    COVID-19 MONOVALENT 12+ (Pfizer) 01/04/2022    COVID-19 Monovalent 12+ (Pfizer 2022) 04/13/2022    Flu, Unspecified 01/01/2022    Influenza (IIV3) PF 12/17/2014    Influenza Vaccine 18-64 (Flublok) 09/28/2023    Influenza Vaccine >6 months,quad, PF 11/23/2020    Pneumococcal 23 valent 03/02/2012    TD,PF 7+ (Tenivac) 02/20/2020    TDAP (Adacel,Boostrix) 04/02/2019    Tdap (Adult) Unspecified Formulation 01/23/2009   Pended Date(s) Pended    COVID-19  "12+ (2023-24) (Pfizer) 12/11/2023    RSV Vaccine (Abrysvo) 12/11/2023        Wt Readings from Last 3 Encounters:   12/11/23 98 kg (216 lb)   09/28/23 96.8 kg (213 lb 4.8 oz)   08/02/23 91.2 kg (201 lb 1.6 oz)        BP Readings from Last 6 Encounters:   12/11/23 130/78   09/28/23 133/84   08/02/23 127/60   07/26/23 133/80   07/19/23 133/85   06/19/23 136/76        Hemoglobin A1C   Date Value Ref Range Status   03/03/2023 5.7 (H) 0.0 - 5.6 % Final     Comment:     Normal <5.7%   Prediabetes 5.7-6.4%    Diabetes 6.5% or higher     Note: Adopted from ADA consensus guidelines.   02/20/2020 6.0 3.5 - 6.0 % Final   03/02/2012 5.7 3.5 - 6.0 % Final        PHYSICAL EXAM:    /78   Pulse 72   Temp 98.6  F (37  C)   Resp 18   Ht 1.854 m (6' 1\")   Wt 98 kg (216 lb)   SpO2 99%   BMI 28.50 kg/m       General: Patient no signs of distress    He walks with a normal gait and has no signs of any significant overall muscular dysfunction or deficits.  He has limited range of motion in the low back.                          Answers submitted by the patient for this visit:  Patient Health Questionnaire (Submitted on 12/11/2023)  If you checked off any problems, how difficult have these problems made it for you to do your work, take care of things at home, or get along with other people?: Not difficult at all  PHQ9 TOTAL SCORE: 6  ANTONIO-7 (Submitted on 12/11/2023)  ANTONIO 7 TOTAL SCORE: 7  Back Pain Visit Questionnaire (Submitted on 12/11/2023)  Your back pain is: chronic  Chronic or Recurring Back Pain Visit Questionnaire (Submitted on 12/11/2023)  Where is your back pain located? : right lower back, right buttock, right hip, right side of waist  How would you describe your back pain? : dull ache, shooting  Where does your back pain spread? : right buttocks  Since you noticed your back pain, how has it changed? : gradually worsening  Does your back pain interfere with your job?: Not applicable  General Questionnaire (Submitted on " 12/11/2023)  Chief Complaint: Chronic problems general questions HPI Form  How many servings of fruits and vegetables do you eat daily?: 2-3  On average, how many sweetened beverages do you drink each day (Examples: soda, juice, sweet tea, etc.  Do NOT count diet or artificially sweetened beverages)?: 2  How many minutes a day do you exercise enough to make your heart beat faster?: 20 to 29  How many days a week do you exercise enough to make your heart beat faster?: 4  How many days per week do you miss taking your medication?: 0

## 2023-12-12 ENCOUNTER — TRANSFERRED RECORDS (OUTPATIENT)
Dept: HEALTH INFORMATION MANAGEMENT | Facility: CLINIC | Age: 63
End: 2023-12-12
Payer: COMMERCIAL

## 2023-12-13 DIAGNOSIS — G89.29 OTHER CHRONIC PAIN: ICD-10-CM

## 2023-12-13 DIAGNOSIS — F90.0 ATTENTION-DEFICIT HYPERACTIVITY DISORDER, PREDOMINANTLY INATTENTIVE TYPE: ICD-10-CM

## 2023-12-13 DIAGNOSIS — M48.061 SPINAL STENOSIS OF LUMBAR REGION WITHOUT NEUROGENIC CLAUDICATION: ICD-10-CM

## 2023-12-14 RX ORDER — DEXTROAMPHETAMINE SACCHARATE, AMPHETAMINE ASPARTATE MONOHYDRATE, DEXTROAMPHETAMINE SULFATE AND AMPHETAMINE SULFATE 3.75; 3.75; 3.75; 3.75 MG/1; MG/1; MG/1; MG/1
15 CAPSULE, EXTENDED RELEASE ORAL DAILY
Qty: 30 CAPSULE | Refills: 0 | Status: CANCELLED | OUTPATIENT
Start: 2023-12-14

## 2023-12-14 RX ORDER — OXYCODONE HYDROCHLORIDE 5 MG/1
5 TABLET ORAL EVERY 6 HOURS PRN
Qty: 120 TABLET | Refills: 0 | Status: CANCELLED | OUTPATIENT
Start: 2023-12-14

## 2024-01-09 DIAGNOSIS — M48.061 SPINAL STENOSIS OF LUMBAR REGION WITHOUT NEUROGENIC CLAUDICATION: ICD-10-CM

## 2024-01-09 DIAGNOSIS — G89.29 OTHER CHRONIC PAIN: ICD-10-CM

## 2024-01-09 DIAGNOSIS — G47.00 INSOMNIA, UNSPECIFIED TYPE: ICD-10-CM

## 2024-01-09 DIAGNOSIS — F90.0 ATTENTION-DEFICIT HYPERACTIVITY DISORDER, PREDOMINANTLY INATTENTIVE TYPE: ICD-10-CM

## 2024-01-09 RX ORDER — DEXTROAMPHETAMINE SACCHARATE, AMPHETAMINE ASPARTATE MONOHYDRATE, DEXTROAMPHETAMINE SULFATE AND AMPHETAMINE SULFATE 3.75; 3.75; 3.75; 3.75 MG/1; MG/1; MG/1; MG/1
15 CAPSULE, EXTENDED RELEASE ORAL DAILY
Qty: 30 CAPSULE | Refills: 0 | Status: SHIPPED | OUTPATIENT
Start: 2024-01-09 | End: 2024-02-02

## 2024-01-09 RX ORDER — OXYCODONE HYDROCHLORIDE 5 MG/1
5 TABLET ORAL EVERY 6 HOURS PRN
Qty: 120 TABLET | Refills: 0 | Status: SHIPPED | OUTPATIENT
Start: 2024-01-09 | End: 2024-02-02

## 2024-01-09 RX ORDER — ZOLPIDEM TARTRATE 5 MG/1
5 TABLET ORAL
Qty: 30 TABLET | Refills: 0 | Status: SHIPPED | OUTPATIENT
Start: 2024-01-09 | End: 2024-02-02

## 2024-02-02 DIAGNOSIS — G89.29 OTHER CHRONIC PAIN: ICD-10-CM

## 2024-02-02 DIAGNOSIS — M48.061 SPINAL STENOSIS OF LUMBAR REGION WITHOUT NEUROGENIC CLAUDICATION: ICD-10-CM

## 2024-02-02 DIAGNOSIS — F90.0 ATTENTION-DEFICIT HYPERACTIVITY DISORDER, PREDOMINANTLY INATTENTIVE TYPE: ICD-10-CM

## 2024-02-02 DIAGNOSIS — G47.00 INSOMNIA, UNSPECIFIED TYPE: ICD-10-CM

## 2024-02-02 RX ORDER — DEXTROAMPHETAMINE SACCHARATE, AMPHETAMINE ASPARTATE MONOHYDRATE, DEXTROAMPHETAMINE SULFATE AND AMPHETAMINE SULFATE 3.75; 3.75; 3.75; 3.75 MG/1; MG/1; MG/1; MG/1
15 CAPSULE, EXTENDED RELEASE ORAL DAILY
Qty: 30 CAPSULE | Refills: 0 | Status: SHIPPED | OUTPATIENT
Start: 2024-02-02 | End: 2024-03-04

## 2024-02-02 RX ORDER — ZOLPIDEM TARTRATE 5 MG/1
5 TABLET ORAL
Qty: 30 TABLET | Refills: 0 | Status: SHIPPED | OUTPATIENT
Start: 2024-02-02 | End: 2024-03-04

## 2024-02-02 RX ORDER — OXYCODONE HYDROCHLORIDE 5 MG/1
5 TABLET ORAL EVERY 6 HOURS PRN
Qty: 120 TABLET | Refills: 0 | Status: SHIPPED | OUTPATIENT
Start: 2024-02-02 | End: 2024-03-04

## 2024-02-08 ENCOUNTER — MYC MEDICAL ADVICE (OUTPATIENT)
Dept: FAMILY MEDICINE | Facility: CLINIC | Age: 64
End: 2024-02-08
Payer: COMMERCIAL

## 2024-02-08 ENCOUNTER — MYC MEDICAL ADVICE (OUTPATIENT)
Dept: FAMILY MEDICINE | Facility: CLINIC | Age: 64
End: 2024-02-08

## 2024-02-08 DIAGNOSIS — N52.9 ERECTILE DYSFUNCTION, UNSPECIFIED ERECTILE DYSFUNCTION TYPE: ICD-10-CM

## 2024-02-08 RX ORDER — SILDENAFIL 100 MG/1
100 TABLET, FILM COATED ORAL DAILY PRN
Qty: 30 TABLET | Refills: 1 | Status: SHIPPED | OUTPATIENT
Start: 2024-02-08 | End: 2024-10-07

## 2024-02-08 NOTE — TELEPHONE ENCOUNTER
Routing refill request to provider for review/approval because:  Drug not active on patient's medication list    Pending Prescriptions:                       Disp   Refills    sildenafil (VIAGRA) 100 MG tablet         30 tab*1            Sig: Take 1 tablet (100 mg) by mouth daily as needed    Last Written Prescription Date:  8/14/23  Last Fill Quantity: 30,  # refills: 1   Last office visitwith prescribing provider: 12/11/2023   Future Office Visit:  None scheduled    GENTRY BaiN, RN  [facility]

## 2024-03-04 DIAGNOSIS — G47.00 INSOMNIA, UNSPECIFIED TYPE: ICD-10-CM

## 2024-03-04 DIAGNOSIS — F90.0 ATTENTION-DEFICIT HYPERACTIVITY DISORDER, PREDOMINANTLY INATTENTIVE TYPE: ICD-10-CM

## 2024-03-04 DIAGNOSIS — M48.061 SPINAL STENOSIS OF LUMBAR REGION WITHOUT NEUROGENIC CLAUDICATION: ICD-10-CM

## 2024-03-04 DIAGNOSIS — G89.29 OTHER CHRONIC PAIN: ICD-10-CM

## 2024-03-04 RX ORDER — OXYCODONE HYDROCHLORIDE 5 MG/1
5 TABLET ORAL EVERY 6 HOURS PRN
Qty: 120 TABLET | Refills: 0 | Status: SHIPPED | OUTPATIENT
Start: 2024-03-04 | End: 2024-04-08

## 2024-03-04 RX ORDER — ZOLPIDEM TARTRATE 5 MG/1
5 TABLET ORAL
Qty: 30 TABLET | Refills: 0 | Status: SHIPPED | OUTPATIENT
Start: 2024-03-04 | End: 2024-04-08

## 2024-03-04 RX ORDER — DEXTROAMPHETAMINE SACCHARATE, AMPHETAMINE ASPARTATE MONOHYDRATE, DEXTROAMPHETAMINE SULFATE AND AMPHETAMINE SULFATE 3.75; 3.75; 3.75; 3.75 MG/1; MG/1; MG/1; MG/1
15 CAPSULE, EXTENDED RELEASE ORAL DAILY
Qty: 30 CAPSULE | Refills: 0 | Status: SHIPPED | OUTPATIENT
Start: 2024-03-04 | End: 2024-04-08

## 2024-04-07 DIAGNOSIS — M48.061 SPINAL STENOSIS OF LUMBAR REGION WITHOUT NEUROGENIC CLAUDICATION: ICD-10-CM

## 2024-04-07 DIAGNOSIS — G47.00 INSOMNIA, UNSPECIFIED TYPE: ICD-10-CM

## 2024-04-07 DIAGNOSIS — G89.29 OTHER CHRONIC PAIN: ICD-10-CM

## 2024-04-07 DIAGNOSIS — J31.0 CHRONIC RHINITIS: Primary | ICD-10-CM

## 2024-04-07 DIAGNOSIS — F90.0 ATTENTION-DEFICIT HYPERACTIVITY DISORDER, PREDOMINANTLY INATTENTIVE TYPE: ICD-10-CM

## 2024-04-08 RX ORDER — FLUTICASONE PROPIONATE 50 MCG
SPRAY, SUSPENSION (ML) NASAL
Qty: 16 G | Refills: 1 | Status: ON HOLD | OUTPATIENT
Start: 2024-04-08 | End: 2024-06-11

## 2024-04-08 RX ORDER — ZOLPIDEM TARTRATE 5 MG/1
5 TABLET ORAL
Qty: 30 TABLET | Refills: 0 | Status: SHIPPED | OUTPATIENT
Start: 2024-04-08 | End: 2024-05-03

## 2024-04-08 RX ORDER — OXYCODONE HYDROCHLORIDE 5 MG/1
5 TABLET ORAL EVERY 6 HOURS PRN
Qty: 120 TABLET | Refills: 0 | Status: SHIPPED | OUTPATIENT
Start: 2024-04-08 | End: 2024-05-03

## 2024-04-08 RX ORDER — DEXTROAMPHETAMINE SACCHARATE, AMPHETAMINE ASPARTATE MONOHYDRATE, DEXTROAMPHETAMINE SULFATE AND AMPHETAMINE SULFATE 3.75; 3.75; 3.75; 3.75 MG/1; MG/1; MG/1; MG/1
15 CAPSULE, EXTENDED RELEASE ORAL DAILY
Qty: 30 CAPSULE | Refills: 0 | Status: SHIPPED | OUTPATIENT
Start: 2024-04-08 | End: 2024-05-03

## 2024-04-10 ENCOUNTER — TRANSFERRED RECORDS (OUTPATIENT)
Dept: HEALTH INFORMATION MANAGEMENT | Facility: CLINIC | Age: 64
End: 2024-04-10
Payer: COMMERCIAL

## 2024-04-12 ENCOUNTER — TRANSFERRED RECORDS (OUTPATIENT)
Dept: HEALTH INFORMATION MANAGEMENT | Facility: CLINIC | Age: 64
End: 2024-04-12
Payer: COMMERCIAL

## 2024-04-15 ASSESSMENT — PATIENT HEALTH QUESTIONNAIRE - PHQ9
SUM OF ALL RESPONSES TO PHQ QUESTIONS 1-9: 12
SUM OF ALL RESPONSES TO PHQ QUESTIONS 1-9: 12
10. IF YOU CHECKED OFF ANY PROBLEMS, HOW DIFFICULT HAVE THESE PROBLEMS MADE IT FOR YOU TO DO YOUR WORK, TAKE CARE OF THINGS AT HOME, OR GET ALONG WITH OTHER PEOPLE: NOT DIFFICULT AT ALL

## 2024-04-15 NOTE — TELEPHONE ENCOUNTER
Depression Screening Follow Up        Follow Up Actions Taken  Patient counseled, no additional follow up at this time.       Called patient to discuss the results of his PHQ 9, patient states that he denies any feelings of self harm or thoughts of suicide and verbally contracted to safety, stating that he has family he can reach out to if he starts to have thoughts of suicide. Patient states that he doesn't feel that his depression is better or worse then before and says he is dealing with it. States that he doesn't have much energy and doesn't want to do things but he makes himself.     Offered to schedule an appointment to follow up with Dr. Dejesus. Pt states that he is waiting to hear from insurance about getting surgery for his back pain. Pt states that he will call back to schedule follow up/preop with provider when he gets surgery scheduled.     Reno Paulino RN  [facility]

## 2024-05-03 DIAGNOSIS — G89.29 OTHER CHRONIC PAIN: ICD-10-CM

## 2024-05-03 DIAGNOSIS — G47.00 INSOMNIA, UNSPECIFIED TYPE: ICD-10-CM

## 2024-05-03 DIAGNOSIS — M48.061 SPINAL STENOSIS OF LUMBAR REGION WITHOUT NEUROGENIC CLAUDICATION: ICD-10-CM

## 2024-05-03 DIAGNOSIS — F90.0 ATTENTION-DEFICIT HYPERACTIVITY DISORDER, PREDOMINANTLY INATTENTIVE TYPE: ICD-10-CM

## 2024-05-03 RX ORDER — DEXTROAMPHETAMINE SACCHARATE, AMPHETAMINE ASPARTATE MONOHYDRATE, DEXTROAMPHETAMINE SULFATE AND AMPHETAMINE SULFATE 3.75; 3.75; 3.75; 3.75 MG/1; MG/1; MG/1; MG/1
15 CAPSULE, EXTENDED RELEASE ORAL DAILY
Qty: 30 CAPSULE | Refills: 0 | Status: SHIPPED | OUTPATIENT
Start: 2024-05-06 | End: 2024-06-03

## 2024-05-03 RX ORDER — OXYCODONE HYDROCHLORIDE 5 MG/1
5 TABLET ORAL EVERY 6 HOURS PRN
Qty: 120 TABLET | Refills: 0 | Status: SHIPPED | OUTPATIENT
Start: 2024-05-06 | End: 2024-06-03

## 2024-05-03 RX ORDER — ZOLPIDEM TARTRATE 5 MG/1
5 TABLET ORAL
Qty: 30 TABLET | Refills: 0 | Status: SHIPPED | OUTPATIENT
Start: 2024-05-06 | End: 2024-06-03

## 2024-05-19 ASSESSMENT — ASTHMA QUESTIONNAIRES
QUESTION_1 LAST FOUR WEEKS HOW MUCH OF THE TIME DID YOUR ASTHMA KEEP YOU FROM GETTING AS MUCH DONE AT WORK, SCHOOL OR AT HOME: NONE OF THE TIME
QUESTION_3 LAST FOUR WEEKS HOW OFTEN DID YOUR ASTHMA SYMPTOMS (WHEEZING, COUGHING, SHORTNESS OF BREATH, CHEST TIGHTNESS OR PAIN) WAKE YOU UP AT NIGHT OR EARLIER THAN USUAL IN THE MORNING: NOT AT ALL
QUESTION_4 LAST FOUR WEEKS HOW OFTEN HAVE YOU USED YOUR RESCUE INHALER OR NEBULIZER MEDICATION (SUCH AS ALBUTEROL): ONCE A WEEK OR LESS
ACT_TOTALSCORE: 23
ACT_TOTALSCORE: 23
QUESTION_5 LAST FOUR WEEKS HOW WOULD YOU RATE YOUR ASTHMA CONTROL: WELL CONTROLLED
QUESTION_2 LAST FOUR WEEKS HOW OFTEN HAVE YOU HAD SHORTNESS OF BREATH: NOT AT ALL

## 2024-05-24 ENCOUNTER — OFFICE VISIT (OUTPATIENT)
Dept: FAMILY MEDICINE | Facility: CLINIC | Age: 64
End: 2024-05-24
Payer: COMMERCIAL

## 2024-05-24 VITALS
SYSTOLIC BLOOD PRESSURE: 132 MMHG | DIASTOLIC BLOOD PRESSURE: 76 MMHG | BODY MASS INDEX: 28.4 KG/M2 | HEART RATE: 59 BPM | TEMPERATURE: 98.6 F | HEIGHT: 73 IN | OXYGEN SATURATION: 99 % | WEIGHT: 214.3 LBS | RESPIRATION RATE: 18 BRPM

## 2024-05-24 DIAGNOSIS — Z01.818 PREOP GENERAL PHYSICAL EXAM: Primary | ICD-10-CM

## 2024-05-24 DIAGNOSIS — R73.03 PREDIABETES: ICD-10-CM

## 2024-05-24 DIAGNOSIS — M48.061 SPINAL STENOSIS OF LUMBAR REGION WITHOUT NEUROGENIC CLAUDICATION: ICD-10-CM

## 2024-05-24 DIAGNOSIS — E78.5 DYSLIPIDEMIA: ICD-10-CM

## 2024-05-24 DIAGNOSIS — F90.0 ATTENTION-DEFICIT HYPERACTIVITY DISORDER, PREDOMINANTLY INATTENTIVE TYPE: ICD-10-CM

## 2024-05-24 DIAGNOSIS — G47.00 INSOMNIA, UNSPECIFIED TYPE: ICD-10-CM

## 2024-05-24 DIAGNOSIS — J31.0 CHRONIC RHINITIS: ICD-10-CM

## 2024-05-24 DIAGNOSIS — Z12.5 SCREENING FOR PROSTATE CANCER: ICD-10-CM

## 2024-05-24 LAB
ATRIAL RATE - MUSE: 55 BPM
DIASTOLIC BLOOD PRESSURE - MUSE: NORMAL MMHG
ERYTHROCYTE [DISTWIDTH] IN BLOOD BY AUTOMATED COUNT: 12.1 % (ref 10–15)
HBA1C MFR BLD: 5.7 % (ref 0–5.6)
HCT VFR BLD AUTO: 42.8 % (ref 40–53)
HGB BLD-MCNC: 14.6 G/DL (ref 13.3–17.7)
INTERPRETATION ECG - MUSE: NORMAL
MCH RBC QN AUTO: 30.7 PG (ref 26.5–33)
MCHC RBC AUTO-ENTMCNC: 34.1 G/DL (ref 31.5–36.5)
MCV RBC AUTO: 90 FL (ref 78–100)
P AXIS - MUSE: 69 DEGREES
PLATELET # BLD AUTO: 292 10E3/UL (ref 150–450)
PR INTERVAL - MUSE: 282 MS
QRS DURATION - MUSE: 96 MS
QT - MUSE: 438 MS
QTC - MUSE: 419 MS
R AXIS - MUSE: 13 DEGREES
RBC # BLD AUTO: 4.75 10E6/UL (ref 4.4–5.9)
SYSTOLIC BLOOD PRESSURE - MUSE: NORMAL MMHG
T AXIS - MUSE: 71 DEGREES
VENTRICULAR RATE- MUSE: 55 BPM
WBC # BLD AUTO: 4.5 10E3/UL (ref 4–11)

## 2024-05-24 PROCEDURE — G0103 PSA SCREENING: HCPCS | Performed by: FAMILY MEDICINE

## 2024-05-24 PROCEDURE — 93005 ELECTROCARDIOGRAM TRACING: CPT | Performed by: FAMILY MEDICINE

## 2024-05-24 PROCEDURE — 93010 ELECTROCARDIOGRAM REPORT: CPT | Performed by: GENERAL ACUTE CARE HOSPITAL

## 2024-05-24 PROCEDURE — 85027 COMPLETE CBC AUTOMATED: CPT | Performed by: FAMILY MEDICINE

## 2024-05-24 PROCEDURE — 80048 BASIC METABOLIC PNL TOTAL CA: CPT | Performed by: FAMILY MEDICINE

## 2024-05-24 PROCEDURE — 80061 LIPID PANEL: CPT | Performed by: FAMILY MEDICINE

## 2024-05-24 PROCEDURE — 99214 OFFICE O/P EST MOD 30 MIN: CPT | Performed by: FAMILY MEDICINE

## 2024-05-24 PROCEDURE — 36415 COLL VENOUS BLD VENIPUNCTURE: CPT | Performed by: FAMILY MEDICINE

## 2024-05-24 PROCEDURE — 83036 HEMOGLOBIN GLYCOSYLATED A1C: CPT | Performed by: FAMILY MEDICINE

## 2024-05-24 ASSESSMENT — PAIN SCALES - GENERAL: PAINLEVEL: MODERATE PAIN (5)

## 2024-05-24 NOTE — H&P (VIEW-ONLY)
Preoperative Evaluation  [facility]  [address]  [facility] [address]  Phone: [phone]  Fax: [phone]  Primary Provider: Segundo Dejesus MD, MD  Pre-op Performing Provider: Segundo Dejesus MD, MD  May 24, 2024             5/19/2024   Surgical Information   What procedure is being done? LUMBAR 5 - SACRAL 1 ANTERIOR LUMBAR INTERBODY FUSION, POSTERIOR INSTRUMENTED FUSION WITH O ARM AND STEALTH NAVIGATION    Facility or Hospital where procedure/surgery will be performed: [facility]   Who is doing the procedure / surgery? Dr Desir   Date of surgery / procedure: January 11, 2024   Time of surgery / procedure: 11:55 AM   Where do you plan to recover after surgery? at home alone     Fax number for surgical facility: [phone]    Assessment & Plan     The proposed surgical procedure is considered INTERMEDIATE risk.    Segundo was seen today for recheck medication and pre-op exam.    Diagnoses and all orders for this visit:    Preop general physical exam  -     EKG 12-lead, tracing only  -     Basic metabolic panel  (Ca, Cl, CO2, Creat, Gluc, K, Na, BUN); Future  -     CBC with platelets; Future  -     Basic metabolic panel  (Ca, Cl, CO2, Creat, Gluc, K, Na, BUN)  -     CBC with platelets  -     EKG 12-lead, tracing only  -     EKG 12-lead, tracing only    Spinal stenosis of lumbar region without neurogenic claudication    Insomnia, unspecified type    Attention-deficit hyperactivity disorder, predominantly inattentive type    Prediabetes  -     Hemoglobin A1c    Dyslipidemia  -     Lipid panel reflex to direct LDL Fasting    Chronic rhinitis    Screening for prostate cancer  -     PSA, screen          - No identified additional risk factors other than previously addressed    Antiplatelet or Anticoagulation Medication Instructions   - Patient is on no antiplatelet or anticoagulation medications.    Additional Medication Instructions  Reviewed with patient the  importance of not taking NSAIDs for 1 week prior to the procedure.    Hold stimulant medication the morning of the procedure.  Recommend taking other medications as usual  Unless instructed otherwise by surgical team.    Recommendation  Approval given to proceed with proposed procedure, without further diagnostic evaluation.        eBcca Raymond is a 64 year old, presenting for the following:  Recheck Medication and Pre-Op Exam    He has longstanding chronic back issues including spinal stenosis and spondylolisthesis in the lumbar spine.  He has exhausted conservative means for managing his pain issues and is now scheduled for surgical intervention.      Due to his chronic pain from the above-stated concerns he takes oxycodone, he takes 5 mg divided 4 times daily for a total daily dose of 20 mg.  There is no evidence of misuse or diversion of the medication.  He does not have significant side effects of med.  His chronic use of oxycodone will need to be taken into account when managing postoperative pain.      He has attention deficit disorder and has benefited from use of stimulant medication.  No side effects of the medication are noted no evidence of misuse or diversion of the medication.      He takes zolpidem for sleep.    He has allergic rhinitis benefits from taking Singulair and utilizing Flonase no concerns for acute respiratory infection.    He has a history of prediabetes with an A1c as high as 6.0, no symptoms of hyperglycemia overall low risk for progression.  Will recheck A1c at this time.  He has dyslipidemia without any history of vascular disease.  Will recheck cholesterol at this time.  He request prostate cancer screening with PSA test today.      No signs or symptoms of an acute infection process or any decompensation of his chronic medical concerns.  No history of any concerns related to anesthesia in the past.                        5/19/2024   Pre-Op Questionnaire   Have you ever had a  heart attack or stroke? No   Have you ever had surgery on your heart or blood vessels, such as a stent placement, a coronary artery bypass, or surgery on an artery in your head, neck, heart, or legs? No   Do you have chest pain with activity? No   Do you have a history of heart failure? No   Do you currently have a cold, bronchitis or symptoms of other infection? No   Do you have a cough, shortness of breath, or wheezing? No   Do you or anyone in your family have previous history of blood clots? No   Do you or does anyone in your family have a serious bleeding problem such as prolonged bleeding following surgeries or cuts? No   Have you ever had problems with anemia or been told to take iron pills? No   Have you had any abnormal blood loss such as black, tarry or bloody stools? No   Have you ever had a blood transfusion? No   Are you willing to have a blood transfusion if it is medically needed before, during, or after your surgery? Yes   Have you or any of your relatives ever had problems with anesthesia? No   Do you have sleep apnea, excessive snoring or daytime drowsiness? No   Do you have any artifical heart valves or other implanted medical devices like a pacemaker, defibrillator, or continuous glucose monitor? No   Do you have artificial joints? (!) YES   Are you allergic to latex? No     Health Care Directive  Patient does not have a Health Care Directive or Living Will:     Preoperative Review of    reviewed - controlled substances reflected in medication list.          Patient Active Problem List    Diagnosis Date Noted    Dyslipidemia 10/29/2021     Priority: Medium     Formatting of this note might be different from the original.  Created by Conversion      Pelvic and perineal pain 04/22/2020     Priority: Medium    Current moderate episode of major depressive disorder without prior episode (H) 03/16/2020     Priority: Medium    Slowing of urinary stream 02/28/2020     Priority: Medium    Abdominal  pain 02/28/2020     Priority: Medium    Lower urinary tract symptoms 02/28/2020     Priority: Medium    Asthma      Priority: Medium     Created by Conversion        Allergic Rhinitis      Priority: Medium     Created by Conversion  Replacement Utility updated for latest IMO load        Eczema      Priority: Medium     Created by Conversion        Dyslipidemia      Priority: Medium     Created by Conversion        Benign Prostatic Hypertrophy      Priority: Medium     Created by Conversion        Lethargy      Priority: Medium     Created by Conversion        Impaired Fasting Glucose      Priority: Medium     Created by Conversion          Past Medical History:   Diagnosis Date    Abdominal pain 02/28/2020    Allergic rhinitis     Created by Conversion Replacement Utility updated for latest IMO load     Asthma     Created by Conversion     Benign Prostatic Hypertrophy     Created by Conversion     Current moderate episode of major depressive disorder without prior episode (H) 03/16/2020    Dyslipidemia 10/29/2021    Formatting of this note might be different from the original. Created by Conversion    Dyslipidemia     Created by Conversion     Eczema     Created by Conversion     Lethargy     Created by Conversion     Lower urinary tract symptoms 02/28/2020    Pelvic and perineal pain 04/22/2020    Slowing of urinary stream 02/28/2020     Past Surgical History:   Procedure Laterality Date    ARTHROSCOPY KNEE Left     DAVINCI XI HERNIORRHAPHY INGUINAL Right 8/2/2023    Procedure: HERNIORRHAPHY, INGUINAL, ROBOT-ASSISTED, LAPAROSCOPIC, USING DA AVEL XI;  Surgeon: Neftaly Rm MD;  Location: [facility] Main OR     Current Outpatient Medications   Medication Sig Dispense Refill    albuterol (PROAIR HFA) 90 mcg/actuation inhaler [ALBUTEROL (PROAIR HFA) 90 MCG/ACTUATION INHALER] Inhale 1-2 puffs. Every 4-6 hours as needed and as directed.      amphetamine-dextroamphetamine (ADDERALL XR) 15 MG 24 hr capsule Take  "1 capsule (15 mg) by mouth daily 30 capsule 0    fluticasone (FLONASE) 50 MCG/ACT nasal spray USE 2 SPRAYS IN EACH NOSTIL DAILY 16 g 1    montelukast (SINGULAIR) 10 mg tablet Take 10 mg by mouth daily      oxyCODONE (ROXICODONE) 5 MG tablet Take 1 tablet (5 mg) by mouth every 6 hours as needed for severe pain 120 tablet 0    sildenafil (VIAGRA) 100 MG tablet Take 1 tablet (100 mg) by mouth daily as needed 30 tablet 1    zolpidem (AMBIEN) 5 MG tablet Take 1 tablet (5 mg) by mouth nightly as needed for sleep 30 tablet 0       No Known Allergies     Social History     Tobacco Use    Smoking status: Never     Passive exposure: Never    Smokeless tobacco: Former     Types: Chew   Substance Use Topics    Alcohol use: Yes     Alcohol/week: 2.0 standard drinks of alcohol       History   Drug Use No           Complete review of systems is obtained.  Other than the specific considerations noted above complete review of systems is negative.      Objective    /76   Pulse 59   Temp 98.6  F (37  C)   Resp 18   Ht 1.854 m (6' 1\")   Wt 97.2 kg (214 lb 4.8 oz)   SpO2 99%   BMI 28.27 kg/m     Estimated body mass index is 28.27 kg/m  as calculated from the following:    Height as of this encounter: 1.854 m (6' 1\").    Weight as of this encounter: 97.2 kg (214 lb 4.8 oz).  Physical Exam        General Appearance:    Alert, cooperative, no distress   Eyes:   No scleral icterus or conjunctival irritation       Ears:    Normal TM's and external ear canals, both ears   Throat:   Lips, mucosa, and tongue normal; teeth and gums normal   Neck:   Supple, symmetrical, trachea midline, no adenopathy;        thyroid:  No enlargement/tenderness/nodules   Lungs:     Clear to auscultation bilaterally, respirations unlabored, no wheezes or crackles   Heart:    Regular rate and rhythm,  No murmur   Abdomen:    Soft, no distention, no tenderness on palpation, no masses, no organomegaly     Extremities:  No edema, no joint swelling or " redness, no evidence of any injuries   Skin:  No concerning skin findings, no suspicious moles, no rashes   Neurologic:  On gross examination there is no motor or sensory deficit.  Patient walks with a normal gait               Recent Labs   Lab Test 07/26/23  1104   HGB 14.5         POTASSIUM 4.6   CR 0.98        Diagnostics  Labs pending at this time.  Results will be reviewed when available.     Recent Results (from the past 24 hour(s))   CBC with platelets    Collection Time: 05/24/24  8:21 AM   Result Value Ref Range    WBC Count 4.5 4.0 - 11.0 10e3/uL    RBC Count 4.75 4.40 - 5.90 10e6/uL    Hemoglobin 14.6 13.3 - 17.7 g/dL    Hematocrit 42.8 40.0 - 53.0 %    MCV 90 78 - 100 fL    MCH 30.7 26.5 - 33.0 pg    MCHC 34.1 31.5 - 36.5 g/dL    RDW 12.1 10.0 - 15.0 %    Platelet Count 292 150 - 450 10e3/uL   EKG 12-lead, tracing only    Collection Time: 05/24/24  8:32 AM   Result Value Ref Range    Systolic Blood Pressure  mmHg    Diastolic Blood Pressure  mmHg    Ventricular Rate 55 BPM    Atrial Rate 55 BPM    AL Interval 282 ms    QRS Duration 96 ms     ms    QTc 419 ms    P Axis 69 degrees    R AXIS 13 degrees    T Axis 71 degrees    Interpretation ECG       Sinus bradycardia with 1st degree A-V block  Otherwise normal ECG  When compared with ECG of 26-JUL-2023 10:59,  No significant change was found          Revised Cardiac Risk Index (RCRI)  The patient has the following serious cardiovascular risks for perioperative complications:   - No serious cardiac risks = 0 points     RCRI Interpretation: 0 points: Class I (very low risk - 0.4% complication rate)         Signed Electronically by: Segundo Dejesus MD, MD  Copy of this evaluation report is provided to requesting physician.

## 2024-05-24 NOTE — PROGRESS NOTES
Preoperative Evaluation  [facility]  [address]  [facility] [address]  Phone: [phone]  Fax: [phone]  Primary Provider: Segundo Dejesus MD, MD  Pre-op Performing Provider: Segundo Dejesus MD, MD  May 24, 2024             5/19/2024   Surgical Information   What procedure is being done? LUMBAR 5 - SACRAL 1 ANTERIOR LUMBAR INTERBODY FUSION, POSTERIOR INSTRUMENTED FUSION WITH O ARM AND STEALTH NAVIGATION    Facility or Hospital where procedure/surgery will be performed: [facility]   Who is doing the procedure / surgery? Dr Desir   Date of surgery / procedure: January 11, 2024   Time of surgery / procedure: 11:55 AM   Where do you plan to recover after surgery? at home alone     Fax number for surgical facility: [phone]    Assessment & Plan     The proposed surgical procedure is considered INTERMEDIATE risk.    Segundo was seen today for recheck medication and pre-op exam.    Diagnoses and all orders for this visit:    Preop general physical exam  -     EKG 12-lead, tracing only  -     Basic metabolic panel  (Ca, Cl, CO2, Creat, Gluc, K, Na, BUN); Future  -     CBC with platelets; Future  -     Basic metabolic panel  (Ca, Cl, CO2, Creat, Gluc, K, Na, BUN)  -     CBC with platelets  -     EKG 12-lead, tracing only  -     EKG 12-lead, tracing only    Spinal stenosis of lumbar region without neurogenic claudication    Insomnia, unspecified type    Attention-deficit hyperactivity disorder, predominantly inattentive type    Prediabetes  -     Hemoglobin A1c    Dyslipidemia  -     Lipid panel reflex to direct LDL Fasting    Chronic rhinitis    Screening for prostate cancer  -     PSA, screen          - No identified additional risk factors other than previously addressed    Antiplatelet or Anticoagulation Medication Instructions   - Patient is on no antiplatelet or anticoagulation medications.    Additional Medication Instructions  Reviewed with patient the  importance of not taking NSAIDs for 1 week prior to the procedure.    Hold stimulant medication the morning of the procedure.  Recommend taking other medications as usual  Unless instructed otherwise by surgical team.    Recommendation  Approval given to proceed with proposed procedure, without further diagnostic evaluation.        Becca Raymond is a 64 year old, presenting for the following:  Recheck Medication and Pre-Op Exam    He has longstanding chronic back issues including spinal stenosis and spondylolisthesis in the lumbar spine.  He has exhausted conservative means for managing his pain issues and is now scheduled for surgical intervention.      Due to his chronic pain from the above-stated concerns he takes oxycodone, he takes 5 mg divided 4 times daily for a total daily dose of 20 mg.  There is no evidence of misuse or diversion of the medication.  He does not have significant side effects of med.  His chronic use of oxycodone will need to be taken into account when managing postoperative pain.      He has attention deficit disorder and has benefited from use of stimulant medication.  No side effects of the medication are noted no evidence of misuse or diversion of the medication.      He takes zolpidem for sleep.    He has allergic rhinitis benefits from taking Singulair and utilizing Flonase no concerns for acute respiratory infection.    He has a history of prediabetes with an A1c as high as 6.0, no symptoms of hyperglycemia overall low risk for progression.  Will recheck A1c at this time.  He has dyslipidemia without any history of vascular disease.  Will recheck cholesterol at this time.  He request prostate cancer screening with PSA test today.      No signs or symptoms of an acute infection process or any decompensation of his chronic medical concerns.  No history of any concerns related to anesthesia in the past.                        5/19/2024   Pre-Op Questionnaire   Have you ever had a  heart attack or stroke? No   Have you ever had surgery on your heart or blood vessels, such as a stent placement, a coronary artery bypass, or surgery on an artery in your head, neck, heart, or legs? No   Do you have chest pain with activity? No   Do you have a history of heart failure? No   Do you currently have a cold, bronchitis or symptoms of other infection? No   Do you have a cough, shortness of breath, or wheezing? No   Do you or anyone in your family have previous history of blood clots? No   Do you or does anyone in your family have a serious bleeding problem such as prolonged bleeding following surgeries or cuts? No   Have you ever had problems with anemia or been told to take iron pills? No   Have you had any abnormal blood loss such as black, tarry or bloody stools? No   Have you ever had a blood transfusion? No   Are you willing to have a blood transfusion if it is medically needed before, during, or after your surgery? Yes   Have you or any of your relatives ever had problems with anesthesia? No   Do you have sleep apnea, excessive snoring or daytime drowsiness? No   Do you have any artifical heart valves or other implanted medical devices like a pacemaker, defibrillator, or continuous glucose monitor? No   Do you have artificial joints? (!) YES   Are you allergic to latex? No     Health Care Directive  Patient does not have a Health Care Directive or Living Will:     Preoperative Review of    reviewed - controlled substances reflected in medication list.          Patient Active Problem List    Diagnosis Date Noted    Dyslipidemia 10/29/2021     Priority: Medium     Formatting of this note might be different from the original.  Created by Conversion      Pelvic and perineal pain 04/22/2020     Priority: Medium    Current moderate episode of major depressive disorder without prior episode (H) 03/16/2020     Priority: Medium    Slowing of urinary stream 02/28/2020     Priority: Medium    Abdominal  pain 02/28/2020     Priority: Medium    Lower urinary tract symptoms 02/28/2020     Priority: Medium    Asthma      Priority: Medium     Created by Conversion        Allergic Rhinitis      Priority: Medium     Created by Conversion  Replacement Utility updated for latest IMO load        Eczema      Priority: Medium     Created by Conversion        Dyslipidemia      Priority: Medium     Created by Conversion        Benign Prostatic Hypertrophy      Priority: Medium     Created by Conversion        Lethargy      Priority: Medium     Created by Conversion        Impaired Fasting Glucose      Priority: Medium     Created by Conversion          Past Medical History:   Diagnosis Date    Abdominal pain 02/28/2020    Allergic rhinitis     Created by Conversion Replacement Utility updated for latest IMO load     Asthma     Created by Conversion     Benign Prostatic Hypertrophy     Created by Conversion     Current moderate episode of major depressive disorder without prior episode (H) 03/16/2020    Dyslipidemia 10/29/2021    Formatting of this note might be different from the original. Created by Conversion    Dyslipidemia     Created by Conversion     Eczema     Created by Conversion     Lethargy     Created by Conversion     Lower urinary tract symptoms 02/28/2020    Pelvic and perineal pain 04/22/2020    Slowing of urinary stream 02/28/2020     Past Surgical History:   Procedure Laterality Date    ARTHROSCOPY KNEE Left     DAVINCI XI HERNIORRHAPHY INGUINAL Right 8/2/2023    Procedure: HERNIORRHAPHY, INGUINAL, ROBOT-ASSISTED, LAPAROSCOPIC, USING DA AVEL XI;  Surgeon: Neftaly Rm MD;  Location: [facility] Main OR     Current Outpatient Medications   Medication Sig Dispense Refill    albuterol (PROAIR HFA) 90 mcg/actuation inhaler [ALBUTEROL (PROAIR HFA) 90 MCG/ACTUATION INHALER] Inhale 1-2 puffs. Every 4-6 hours as needed and as directed.      amphetamine-dextroamphetamine (ADDERALL XR) 15 MG 24 hr capsule Take  "1 capsule (15 mg) by mouth daily 30 capsule 0    fluticasone (FLONASE) 50 MCG/ACT nasal spray USE 2 SPRAYS IN EACH NOSTIL DAILY 16 g 1    montelukast (SINGULAIR) 10 mg tablet Take 10 mg by mouth daily      oxyCODONE (ROXICODONE) 5 MG tablet Take 1 tablet (5 mg) by mouth every 6 hours as needed for severe pain 120 tablet 0    sildenafil (VIAGRA) 100 MG tablet Take 1 tablet (100 mg) by mouth daily as needed 30 tablet 1    zolpidem (AMBIEN) 5 MG tablet Take 1 tablet (5 mg) by mouth nightly as needed for sleep 30 tablet 0       No Known Allergies     Social History     Tobacco Use    Smoking status: Never     Passive exposure: Never    Smokeless tobacco: Former     Types: Chew   Substance Use Topics    Alcohol use: Yes     Alcohol/week: 2.0 standard drinks of alcohol       History   Drug Use No           Complete review of systems is obtained.  Other than the specific considerations noted above complete review of systems is negative.      Objective    /76   Pulse 59   Temp 98.6  F (37  C)   Resp 18   Ht 1.854 m (6' 1\")   Wt 97.2 kg (214 lb 4.8 oz)   SpO2 99%   BMI 28.27 kg/m     Estimated body mass index is 28.27 kg/m  as calculated from the following:    Height as of this encounter: 1.854 m (6' 1\").    Weight as of this encounter: 97.2 kg (214 lb 4.8 oz).  Physical Exam        General Appearance:    Alert, cooperative, no distress   Eyes:   No scleral icterus or conjunctival irritation       Ears:    Normal TM's and external ear canals, both ears   Throat:   Lips, mucosa, and tongue normal; teeth and gums normal   Neck:   Supple, symmetrical, trachea midline, no adenopathy;        thyroid:  No enlargement/tenderness/nodules   Lungs:     Clear to auscultation bilaterally, respirations unlabored, no wheezes or crackles   Heart:    Regular rate and rhythm,  No murmur   Abdomen:    Soft, no distention, no tenderness on palpation, no masses, no organomegaly     Extremities:  No edema, no joint swelling or " redness, no evidence of any injuries   Skin:  No concerning skin findings, no suspicious moles, no rashes   Neurologic:  On gross examination there is no motor or sensory deficit.  Patient walks with a normal gait               Recent Labs   Lab Test 07/26/23  1104   HGB 14.5         POTASSIUM 4.6   CR 0.98        Diagnostics  Labs pending at this time.  Results will be reviewed when available.     Recent Results (from the past 24 hour(s))   CBC with platelets    Collection Time: 05/24/24  8:21 AM   Result Value Ref Range    WBC Count 4.5 4.0 - 11.0 10e3/uL    RBC Count 4.75 4.40 - 5.90 10e6/uL    Hemoglobin 14.6 13.3 - 17.7 g/dL    Hematocrit 42.8 40.0 - 53.0 %    MCV 90 78 - 100 fL    MCH 30.7 26.5 - 33.0 pg    MCHC 34.1 31.5 - 36.5 g/dL    RDW 12.1 10.0 - 15.0 %    Platelet Count 292 150 - 450 10e3/uL   EKG 12-lead, tracing only    Collection Time: 05/24/24  8:32 AM   Result Value Ref Range    Systolic Blood Pressure  mmHg    Diastolic Blood Pressure  mmHg    Ventricular Rate 55 BPM    Atrial Rate 55 BPM    LA Interval 282 ms    QRS Duration 96 ms     ms    QTc 419 ms    P Axis 69 degrees    R AXIS 13 degrees    T Axis 71 degrees    Interpretation ECG       Sinus bradycardia with 1st degree A-V block  Otherwise normal ECG  When compared with ECG of 26-JUL-2023 10:59,  No significant change was found          Revised Cardiac Risk Index (RCRI)  The patient has the following serious cardiovascular risks for perioperative complications:   - No serious cardiac risks = 0 points     RCRI Interpretation: 0 points: Class I (very low risk - 0.4% complication rate)         Signed Electronically by: Segundo Dejesus MD, MD  Copy of this evaluation report is provided to requesting physician.

## 2024-05-25 LAB
ANION GAP SERPL CALCULATED.3IONS-SCNC: 9 MMOL/L (ref 7–15)
BUN SERPL-MCNC: 20.1 MG/DL (ref 8–23)
CALCIUM SERPL-MCNC: 9.5 MG/DL (ref 8.8–10.2)
CHLORIDE SERPL-SCNC: 102 MMOL/L (ref 98–107)
CHOLEST SERPL-MCNC: 175 MG/DL
CREAT SERPL-MCNC: 1.04 MG/DL (ref 0.67–1.17)
DEPRECATED HCO3 PLAS-SCNC: 26 MMOL/L (ref 22–29)
EGFRCR SERPLBLD CKD-EPI 2021: 80 ML/MIN/1.73M2
GLUCOSE SERPL-MCNC: 108 MG/DL (ref 70–99)
HDLC SERPL-MCNC: 56 MG/DL
LDLC SERPL CALC-MCNC: 106 MG/DL
NONHDLC SERPL-MCNC: 119 MG/DL
POTASSIUM SERPL-SCNC: 4.6 MMOL/L (ref 3.4–5.3)
PSA SERPL DL<=0.01 NG/ML-MCNC: 0.79 NG/ML (ref 0–4.5)
SODIUM SERPL-SCNC: 137 MMOL/L (ref 135–145)
TRIGL SERPL-MCNC: 66 MG/DL

## 2024-06-03 DIAGNOSIS — F90.0 ATTENTION-DEFICIT HYPERACTIVITY DISORDER, PREDOMINANTLY INATTENTIVE TYPE: ICD-10-CM

## 2024-06-03 DIAGNOSIS — G89.29 OTHER CHRONIC PAIN: ICD-10-CM

## 2024-06-03 DIAGNOSIS — G47.00 INSOMNIA, UNSPECIFIED TYPE: ICD-10-CM

## 2024-06-03 DIAGNOSIS — M48.061 SPINAL STENOSIS OF LUMBAR REGION WITHOUT NEUROGENIC CLAUDICATION: ICD-10-CM

## 2024-06-03 RX ORDER — OXYCODONE HYDROCHLORIDE 5 MG/1
5 TABLET ORAL EVERY 6 HOURS PRN
Qty: 120 TABLET | Refills: 0 | Status: ON HOLD | OUTPATIENT
Start: 2024-06-06 | End: 2024-06-14

## 2024-06-03 RX ORDER — ZOLPIDEM TARTRATE 5 MG/1
5 TABLET ORAL
Qty: 30 TABLET | Refills: 0 | Status: SHIPPED | OUTPATIENT
Start: 2024-06-06 | End: 2024-07-08

## 2024-06-03 RX ORDER — DEXTROAMPHETAMINE SACCHARATE, AMPHETAMINE ASPARTATE MONOHYDRATE, DEXTROAMPHETAMINE SULFATE AND AMPHETAMINE SULFATE 3.75; 3.75; 3.75; 3.75 MG/1; MG/1; MG/1; MG/1
15 CAPSULE, EXTENDED RELEASE ORAL DAILY
Qty: 30 CAPSULE | Refills: 0 | Status: SHIPPED | OUTPATIENT
Start: 2024-06-06 | End: 2024-07-08

## 2024-06-10 RX ORDER — BUPIVACAINE HYDROCHLORIDE AND EPINEPHRINE 2.5; 5 MG/ML; UG/ML
2 INJECTION, SOLUTION EPIDURAL; INFILTRATION; INTRACAUDAL; PERINEURAL ONCE
Status: CANCELLED | OUTPATIENT
Start: 2024-06-10 | End: 2024-06-10

## 2024-06-11 ENCOUNTER — HOSPITAL ENCOUNTER (INPATIENT)
Facility: CLINIC | Age: 64
LOS: 3 days | Discharge: HOME | End: 2024-06-14
Attending: ORTHOPAEDIC SURGERY | Admitting: ORTHOPAEDIC SURGERY
Payer: COMMERCIAL

## 2024-06-11 ENCOUNTER — ANESTHESIA EVENT (OUTPATIENT)
Dept: SURGERY | Facility: CLINIC | Age: 64
End: 2024-06-11
Payer: COMMERCIAL

## 2024-06-11 ENCOUNTER — ANESTHESIA (OUTPATIENT)
Dept: SURGERY | Facility: CLINIC | Age: 64
End: 2024-06-11
Payer: COMMERCIAL

## 2024-06-11 ENCOUNTER — APPOINTMENT (OUTPATIENT)
Dept: RADIOLOGY | Facility: CLINIC | Age: 64
End: 2024-06-11
Attending: ORTHOPAEDIC SURGERY
Payer: COMMERCIAL

## 2024-06-11 DIAGNOSIS — M43.16 SPONDYLOLISTHESIS OF LUMBAR REGION: Primary | ICD-10-CM

## 2024-06-11 LAB — GLUCOSE BLDC GLUCOMTR-MCNC: 109 MG/DL (ref 70–99)

## 2024-06-11 PROCEDURE — 4A11X4G MONITORING OF PERIPHERAL NERVOUS ELECTRICAL ACTIVITY, INTRAOPERATIVE, EXTERNAL APPROACH: ICD-10-PCS | Performed by: ORTHOPAEDIC SURGERY

## 2024-06-11 PROCEDURE — 250N000011 HC RX IP 250 OP 636: Mod: JZ | Performed by: PHYSICIAN ASSISTANT

## 2024-06-11 PROCEDURE — 999N000141 HC STATISTIC PRE-PROCEDURE NURSING ASSESSMENT: Performed by: ORTHOPAEDIC SURGERY

## 2024-06-11 PROCEDURE — 250N000013 HC RX MED GY IP 250 OP 250 PS 637: Performed by: INTERNAL MEDICINE

## 2024-06-11 PROCEDURE — 710N000010 HC RECOVERY PHASE 1, LEVEL 2, PER MIN: Performed by: ORTHOPAEDIC SURGERY

## 2024-06-11 PROCEDURE — 0SH334Z INSERTION OF INTERNAL FIXATION DEVICE INTO LUMBOSACRAL JOINT, PERCUTANEOUS APPROACH: ICD-10-PCS | Performed by: ORTHOPAEDIC SURGERY

## 2024-06-11 PROCEDURE — 250N000025 HC SEVOFLURANE, PER MIN: Performed by: ORTHOPAEDIC SURGERY

## 2024-06-11 PROCEDURE — 250N000013 HC RX MED GY IP 250 OP 250 PS 637: Performed by: STUDENT IN AN ORGANIZED HEALTH CARE EDUCATION/TRAINING PROGRAM

## 2024-06-11 PROCEDURE — 96375 TX/PRO/DX INJ NEW DRUG ADDON: CPT | Mod: XU

## 2024-06-11 PROCEDURE — 278N000051 HC OR IMPLANT GENERAL: Performed by: ORTHOPAEDIC SURGERY

## 2024-06-11 PROCEDURE — 370N000017 HC ANESTHESIA TECHNICAL FEE, PER MIN: Performed by: ORTHOPAEDIC SURGERY

## 2024-06-11 PROCEDURE — 258N000003 HC RX IP 258 OP 636: Mod: JZ | Performed by: PHYSICIAN ASSISTANT

## 2024-06-11 PROCEDURE — 120N000013 HC R&B IMCU

## 2024-06-11 PROCEDURE — 99253 IP/OBS CNSLTJ NEW/EST LOW 45: CPT | Performed by: INTERNAL MEDICINE

## 2024-06-11 PROCEDURE — 250N000009 HC RX 250: Performed by: ORTHOPAEDIC SURGERY

## 2024-06-11 PROCEDURE — G0378 HOSPITAL OBSERVATION PER HR: HCPCS

## 2024-06-11 PROCEDURE — 258N000003 HC RX IP 258 OP 636: Mod: JZ | Performed by: STUDENT IN AN ORGANIZED HEALTH CARE EDUCATION/TRAINING PROGRAM

## 2024-06-11 PROCEDURE — 250N000011 HC RX IP 250 OP 636: Mod: JZ | Performed by: STUDENT IN AN ORGANIZED HEALTH CARE EDUCATION/TRAINING PROGRAM

## 2024-06-11 PROCEDURE — 999N000182 XR SURGERY CARM FLUORO GREATER THAN 5 MIN: Mod: TC

## 2024-06-11 PROCEDURE — 250N000011 HC RX IP 250 OP 636: Performed by: NURSE ANESTHETIST, CERTIFIED REGISTERED

## 2024-06-11 PROCEDURE — 250N000013 HC RX MED GY IP 250 OP 250 PS 637: Performed by: PHYSICIAN ASSISTANT

## 2024-06-11 PROCEDURE — 250N000011 HC RX IP 250 OP 636: Performed by: PHYSICIAN ASSISTANT

## 2024-06-11 PROCEDURE — L8699 PROSTHETIC IMPLANT NOS: HCPCS | Performed by: ORTHOPAEDIC SURGERY

## 2024-06-11 PROCEDURE — C1713 ANCHOR/SCREW BN/BN,TIS/BN: HCPCS | Performed by: ORTHOPAEDIC SURGERY

## 2024-06-11 PROCEDURE — 250N000011 HC RX IP 250 OP 636: Mod: JZ | Performed by: ANESTHESIOLOGY

## 2024-06-11 PROCEDURE — 272N000001 HC OR GENERAL SUPPLY STERILE: Performed by: ORTHOPAEDIC SURGERY

## 2024-06-11 PROCEDURE — 258N000003 HC RX IP 258 OP 636: Mod: JZ | Performed by: ORTHOPAEDIC SURGERY

## 2024-06-11 PROCEDURE — 272N000002 HC OR SUPPLY OTHER OPNP: Performed by: ORTHOPAEDIC SURGERY

## 2024-06-11 PROCEDURE — 272N000004 HC RX 272: Performed by: ORTHOPAEDIC SURGERY

## 2024-06-11 PROCEDURE — 360N000086 HC SURGERY LEVEL 6 W/ FLUORO, PER MIN: Performed by: ORTHOPAEDIC SURGERY

## 2024-06-11 PROCEDURE — 0SB40ZZ EXCISION OF LUMBOSACRAL DISC, OPEN APPROACH: ICD-10-PCS | Performed by: ORTHOPAEDIC SURGERY

## 2024-06-11 PROCEDURE — 250N000009 HC RX 250: Performed by: STUDENT IN AN ORGANIZED HEALTH CARE EDUCATION/TRAINING PROGRAM

## 2024-06-11 PROCEDURE — 250N000011 HC RX IP 250 OP 636: Mod: JZ | Performed by: DENTIST

## 2024-06-11 PROCEDURE — 999N000063 XR ABDOMEN PORT 1 VIEW

## 2024-06-11 PROCEDURE — 0SG30A0 FUSION OF LUMBOSACRAL JOINT WITH INTERBODY FUSION DEVICE, ANTERIOR APPROACH, ANTERIOR COLUMN, OPEN APPROACH: ICD-10-PCS | Performed by: ORTHOPAEDIC SURGERY

## 2024-06-11 PROCEDURE — 250N000011 HC RX IP 250 OP 636: Performed by: ORTHOPAEDIC SURGERY

## 2024-06-11 PROCEDURE — C9290 INJ, BUPIVACAINE LIPOSOME: HCPCS | Performed by: STUDENT IN AN ORGANIZED HEALTH CARE EDUCATION/TRAINING PROGRAM

## 2024-06-11 PROCEDURE — 250N000009 HC RX 250: Performed by: ANESTHESIOLOGY

## 2024-06-11 PROCEDURE — 3E013NZ INTRODUCTION OF ANALGESICS, HYPNOTICS, SEDATIVES INTO SUBCUTANEOUS TISSUE, PERCUTANEOUS APPROACH: ICD-10-PCS | Performed by: ORTHOPAEDIC SURGERY

## 2024-06-11 PROCEDURE — 999N000182 XR SURGERY OARM: Mod: TC

## 2024-06-11 PROCEDURE — 96374 THER/PROPH/DIAG INJ IV PUSH: CPT | Mod: XU

## 2024-06-11 PROCEDURE — 250N000005 HC OR RX SURGIFLO HEMOSTATIC MATRIX 10ML 199102S OPNP: Performed by: ORTHOPAEDIC SURGERY

## 2024-06-11 DEVICE — SPACER 46511214 MD TI 12DG 37X29X14MM
Type: IMPLANTABLE DEVICE | Site: SPINE LUMBAR | Status: FUNCTIONAL
Brand: ANTERALIGN SPINAL SYSTEM WITH TITAN NANOLOCK SURFACE TECHNOLOGY

## 2024-06-11 DEVICE — BONE GRAFT KIT 7510200 INFUSE SMALL
Type: IMPLANTABLE DEVICE | Site: SPINE LUMBAR | Status: FUNCTIONAL
Brand: INFUSE® BONE GRAFT

## 2024-06-11 DEVICE — IMP ROD MEDT CAPPED VOYAGER 4.75X35MM 641000035: Type: IMPLANTABLE DEVICE | Site: SPINE LUMBAR | Status: FUNCTIONAL

## 2024-06-11 DEVICE — IMP SCR MEDT VOYAGER 4.75MM 6440530: Type: IMPLANTABLE DEVICE | Site: SPINE LUMBAR | Status: FUNCTIONAL

## 2024-06-11 DEVICE — IMPLANTABLE DEVICE: Type: IMPLANTABLE DEVICE | Site: SPINE LUMBAR | Status: FUNCTIONAL

## 2024-06-11 RX ORDER — HYDROMORPHONE HCL IN WATER/PF 6 MG/30 ML
0.4 PATIENT CONTROLLED ANALGESIA SYRINGE INTRAVENOUS EVERY 5 MIN PRN
Status: DISCONTINUED | OUTPATIENT
Start: 2024-06-11 | End: 2024-06-11 | Stop reason: ALTCHOICE

## 2024-06-11 RX ORDER — CEFAZOLIN SODIUM/WATER 2 G/20 ML
2 SYRINGE (ML) INTRAVENOUS SEE ADMIN INSTRUCTIONS
Status: DISCONTINUED | OUTPATIENT
Start: 2024-06-11 | End: 2024-06-11 | Stop reason: HOSPADM

## 2024-06-11 RX ORDER — OXYCODONE HYDROCHLORIDE 5 MG/1
10 TABLET ORAL EVERY 4 HOURS PRN
Status: DISCONTINUED | OUTPATIENT
Start: 2024-06-11 | End: 2024-06-13

## 2024-06-11 RX ORDER — CYCLOBENZAPRINE HCL 10 MG
10 TABLET ORAL EVERY 8 HOURS PRN
Status: DISCONTINUED | OUTPATIENT
Start: 2024-06-11 | End: 2024-06-12

## 2024-06-11 RX ORDER — FENTANYL CITRATE 50 UG/ML
INJECTION, SOLUTION INTRAMUSCULAR; INTRAVENOUS PRN
Status: DISCONTINUED | OUTPATIENT
Start: 2024-06-11 | End: 2024-06-11

## 2024-06-11 RX ORDER — DOCUSATE SODIUM 100 MG/1
100 CAPSULE, LIQUID FILLED ORAL DAILY PRN
Status: DISCONTINUED | OUTPATIENT
Start: 2024-06-11 | End: 2024-06-14 | Stop reason: HOSPADM

## 2024-06-11 RX ORDER — MONTELUKAST SODIUM 10 MG/1
10 TABLET ORAL DAILY
Status: DISCONTINUED | OUTPATIENT
Start: 2024-06-12 | End: 2024-06-14 | Stop reason: HOSPADM

## 2024-06-11 RX ORDER — ONDANSETRON 4 MG/1
4 TABLET, ORALLY DISINTEGRATING ORAL EVERY 30 MIN PRN
Status: DISCONTINUED | OUTPATIENT
Start: 2024-06-11 | End: 2024-06-11 | Stop reason: HOSPADM

## 2024-06-11 RX ORDER — FENTANYL CITRATE 50 UG/ML
25 INJECTION, SOLUTION INTRAMUSCULAR; INTRAVENOUS EVERY 5 MIN PRN
Status: DISCONTINUED | OUTPATIENT
Start: 2024-06-11 | End: 2024-06-11 | Stop reason: HOSPADM

## 2024-06-11 RX ORDER — AMOXICILLIN 250 MG
1 CAPSULE ORAL 2 TIMES DAILY
Status: DISCONTINUED | OUTPATIENT
Start: 2024-06-11 | End: 2024-06-13

## 2024-06-11 RX ORDER — ONDANSETRON 2 MG/ML
4 INJECTION INTRAMUSCULAR; INTRAVENOUS EVERY 6 HOURS PRN
Status: DISCONTINUED | OUTPATIENT
Start: 2024-06-11 | End: 2024-06-14 | Stop reason: HOSPADM

## 2024-06-11 RX ORDER — HYDROMORPHONE HCL IN WATER/PF 6 MG/30 ML
0.2 PATIENT CONTROLLED ANALGESIA SYRINGE INTRAVENOUS EVERY 5 MIN PRN
Status: DISCONTINUED | OUTPATIENT
Start: 2024-06-11 | End: 2024-06-11 | Stop reason: HOSPADM

## 2024-06-11 RX ORDER — MULTIVITAMIN,THERAPEUTIC
1 TABLET ORAL DAILY
Status: DISCONTINUED | OUTPATIENT
Start: 2024-06-12 | End: 2024-06-14 | Stop reason: HOSPADM

## 2024-06-11 RX ORDER — OXYCODONE HYDROCHLORIDE 5 MG/1
5 TABLET ORAL EVERY 4 HOURS PRN
Status: DISCONTINUED | OUTPATIENT
Start: 2024-06-11 | End: 2024-06-13

## 2024-06-11 RX ORDER — LIDOCAINE 40 MG/G
CREAM TOPICAL
Status: DISCONTINUED | OUTPATIENT
Start: 2024-06-11 | End: 2024-06-11 | Stop reason: HOSPADM

## 2024-06-11 RX ORDER — NALOXONE HYDROCHLORIDE 0.4 MG/ML
0.2 INJECTION, SOLUTION INTRAMUSCULAR; INTRAVENOUS; SUBCUTANEOUS
Status: DISCONTINUED | OUTPATIENT
Start: 2024-06-11 | End: 2024-06-14 | Stop reason: HOSPADM

## 2024-06-11 RX ORDER — CEFAZOLIN SODIUM 2 G/100ML
2 INJECTION, SOLUTION INTRAVENOUS EVERY 8 HOURS
Qty: 200 ML | Refills: 0 | Status: COMPLETED | OUTPATIENT
Start: 2024-06-11 | End: 2024-06-12

## 2024-06-11 RX ORDER — ACETAMINOPHEN 325 MG/1
975 TABLET ORAL ONCE
Status: COMPLETED | OUTPATIENT
Start: 2024-06-11 | End: 2024-06-11

## 2024-06-11 RX ORDER — DOCUSATE SODIUM 100 MG/1
100 CAPSULE, LIQUID FILLED ORAL DAILY PRN
Status: ON HOLD | COMMUNITY
End: 2024-06-14

## 2024-06-11 RX ORDER — BUPIVACAINE HYDROCHLORIDE 2.5 MG/ML
INJECTION, SOLUTION EPIDURAL; INFILTRATION; INTRACAUDAL
Status: COMPLETED | OUTPATIENT
Start: 2024-06-11 | End: 2024-06-11

## 2024-06-11 RX ORDER — BUPIVACAINE HYDROCHLORIDE AND EPINEPHRINE 2.5; 5 MG/ML; UG/ML
INJECTION, SOLUTION EPIDURAL; INFILTRATION; INTRACAUDAL; PERINEURAL PRN
Status: DISCONTINUED | OUTPATIENT
Start: 2024-06-11 | End: 2024-06-11 | Stop reason: HOSPADM

## 2024-06-11 RX ORDER — FENTANYL CITRATE 50 UG/ML
25-100 INJECTION, SOLUTION INTRAMUSCULAR; INTRAVENOUS
Status: DISCONTINUED | OUTPATIENT
Start: 2024-06-11 | End: 2024-06-11 | Stop reason: HOSPADM

## 2024-06-11 RX ORDER — PROPOFOL 10 MG/ML
INJECTION, EMULSION INTRAVENOUS PRN
Status: DISCONTINUED | OUTPATIENT
Start: 2024-06-11 | End: 2024-06-11

## 2024-06-11 RX ORDER — NALOXONE HYDROCHLORIDE 0.4 MG/ML
0.1 INJECTION, SOLUTION INTRAMUSCULAR; INTRAVENOUS; SUBCUTANEOUS
Status: CANCELLED | OUTPATIENT
Start: 2024-06-11

## 2024-06-11 RX ORDER — FLUTICASONE PROPIONATE 50 MCG
2 SPRAY, SUSPENSION (ML) NASAL DAILY PRN
COMMUNITY

## 2024-06-11 RX ORDER — BUPIVACAINE HYDROCHLORIDE AND EPINEPHRINE 2.5; 5 MG/ML; UG/ML
INJECTION, SOLUTION EPIDURAL; INFILTRATION; INTRACAUDAL; PERINEURAL
Status: DISCONTINUED
Start: 2024-06-11 | End: 2024-06-11 | Stop reason: HOSPADM

## 2024-06-11 RX ORDER — KETAMINE HYDROCHLORIDE 10 MG/ML
INJECTION INTRAMUSCULAR; INTRAVENOUS PRN
Status: DISCONTINUED | OUTPATIENT
Start: 2024-06-11 | End: 2024-06-11

## 2024-06-11 RX ORDER — ZOLPIDEM TARTRATE 5 MG/1
5 TABLET ORAL
Status: DISCONTINUED | OUTPATIENT
Start: 2024-06-11 | End: 2024-06-14 | Stop reason: HOSPADM

## 2024-06-11 RX ORDER — NALOXONE HYDROCHLORIDE 0.4 MG/ML
0.1 INJECTION, SOLUTION INTRAMUSCULAR; INTRAVENOUS; SUBCUTANEOUS
Status: DISCONTINUED | OUTPATIENT
Start: 2024-06-11 | End: 2024-06-11 | Stop reason: HOSPADM

## 2024-06-11 RX ORDER — ONDANSETRON 4 MG/1
4 TABLET, ORALLY DISINTEGRATING ORAL EVERY 6 HOURS PRN
Status: DISCONTINUED | OUTPATIENT
Start: 2024-06-11 | End: 2024-06-14 | Stop reason: HOSPADM

## 2024-06-11 RX ORDER — ONDANSETRON 4 MG/1
4 TABLET, ORALLY DISINTEGRATING ORAL EVERY 30 MIN PRN
Status: CANCELLED | OUTPATIENT
Start: 2024-06-11

## 2024-06-11 RX ORDER — ONDANSETRON 2 MG/ML
4 INJECTION INTRAMUSCULAR; INTRAVENOUS EVERY 30 MIN PRN
Status: CANCELLED | OUTPATIENT
Start: 2024-06-11

## 2024-06-11 RX ORDER — BISACODYL 10 MG
10 SUPPOSITORY, RECTAL RECTAL DAILY PRN
Status: DISCONTINUED | OUTPATIENT
Start: 2024-06-11 | End: 2024-06-14 | Stop reason: HOSPADM

## 2024-06-11 RX ORDER — FENTANYL CITRATE 50 UG/ML
50 INJECTION, SOLUTION INTRAMUSCULAR; INTRAVENOUS EVERY 5 MIN PRN
Status: DISCONTINUED | OUTPATIENT
Start: 2024-06-11 | End: 2024-06-11 | Stop reason: HOSPADM

## 2024-06-11 RX ORDER — DEXAMETHASONE SODIUM PHOSPHATE 4 MG/ML
4 INJECTION, SOLUTION INTRA-ARTICULAR; INTRALESIONAL; INTRAMUSCULAR; INTRAVENOUS; SOFT TISSUE
Status: CANCELLED | OUTPATIENT
Start: 2024-06-11

## 2024-06-11 RX ORDER — LABETALOL HYDROCHLORIDE 5 MG/ML
10 INJECTION, SOLUTION INTRAVENOUS
Status: DISCONTINUED | OUTPATIENT
Start: 2024-06-11 | End: 2024-06-11 | Stop reason: HOSPADM

## 2024-06-11 RX ORDER — CEFAZOLIN SODIUM/WATER 2 G/20 ML
2 SYRINGE (ML) INTRAVENOUS
Status: COMPLETED | OUTPATIENT
Start: 2024-06-11 | End: 2024-06-11

## 2024-06-11 RX ORDER — DEXAMETHASONE SODIUM PHOSPHATE 4 MG/ML
4 INJECTION, SOLUTION INTRA-ARTICULAR; INTRALESIONAL; INTRAMUSCULAR; INTRAVENOUS; SOFT TISSUE
Status: DISCONTINUED | OUTPATIENT
Start: 2024-06-11 | End: 2024-06-11 | Stop reason: HOSPADM

## 2024-06-11 RX ORDER — PROCHLORPERAZINE MALEATE 10 MG
10 TABLET ORAL EVERY 6 HOURS PRN
Status: DISCONTINUED | OUTPATIENT
Start: 2024-06-11 | End: 2024-06-14 | Stop reason: HOSPADM

## 2024-06-11 RX ORDER — HYDROMORPHONE HCL IN WATER/PF 6 MG/30 ML
0.4 PATIENT CONTROLLED ANALGESIA SYRINGE INTRAVENOUS
Status: DISCONTINUED | OUTPATIENT
Start: 2024-06-11 | End: 2024-06-13

## 2024-06-11 RX ORDER — ACETAMINOPHEN 325 MG/1
650 TABLET ORAL EVERY 4 HOURS PRN
Status: DISCONTINUED | OUTPATIENT
Start: 2024-06-14 | End: 2024-06-14 | Stop reason: HOSPADM

## 2024-06-11 RX ORDER — LORATADINE 10 MG/1
10 TABLET ORAL DAILY PRN
Status: DISCONTINUED | OUTPATIENT
Start: 2024-06-11 | End: 2024-06-14 | Stop reason: HOSPADM

## 2024-06-11 RX ORDER — HYDROMORPHONE HCL IN WATER/PF 6 MG/30 ML
0.2 PATIENT CONTROLLED ANALGESIA SYRINGE INTRAVENOUS
Status: DISCONTINUED | OUTPATIENT
Start: 2024-06-11 | End: 2024-06-13

## 2024-06-11 RX ORDER — NALOXONE HYDROCHLORIDE 0.4 MG/ML
0.4 INJECTION, SOLUTION INTRAMUSCULAR; INTRAVENOUS; SUBCUTANEOUS
Status: DISCONTINUED | OUTPATIENT
Start: 2024-06-11 | End: 2024-06-14 | Stop reason: HOSPADM

## 2024-06-11 RX ORDER — DEXAMETHASONE SODIUM PHOSPHATE 10 MG/ML
INJECTION, SOLUTION INTRAMUSCULAR; INTRAVENOUS PRN
Status: DISCONTINUED | OUTPATIENT
Start: 2024-06-11 | End: 2024-06-11

## 2024-06-11 RX ORDER — MORPHINE SULFATE 4 MG/ML
2 INJECTION, SOLUTION INTRAMUSCULAR; INTRAVENOUS EVERY 5 MIN PRN
Status: DISCONTINUED | OUTPATIENT
Start: 2024-06-11 | End: 2024-06-12

## 2024-06-11 RX ORDER — ONDANSETRON 2 MG/ML
4 INJECTION INTRAMUSCULAR; INTRAVENOUS EVERY 30 MIN PRN
Status: DISCONTINUED | OUTPATIENT
Start: 2024-06-11 | End: 2024-06-11 | Stop reason: HOSPADM

## 2024-06-11 RX ORDER — SODIUM CHLORIDE 9 MG/ML
INJECTION, SOLUTION INTRAVENOUS CONTINUOUS
Status: DISCONTINUED | OUTPATIENT
Start: 2024-06-11 | End: 2024-06-14 | Stop reason: HOSPADM

## 2024-06-11 RX ORDER — POLYETHYLENE GLYCOL 3350 17 G/17G
17 POWDER, FOR SOLUTION ORAL DAILY
Status: DISCONTINUED | OUTPATIENT
Start: 2024-06-12 | End: 2024-06-14 | Stop reason: HOSPADM

## 2024-06-11 RX ORDER — HYDROMORPHONE HCL IN WATER/PF 6 MG/30 ML
0.4 PATIENT CONTROLLED ANALGESIA SYRINGE INTRAVENOUS EVERY 5 MIN PRN
Status: DISCONTINUED | OUTPATIENT
Start: 2024-06-11 | End: 2024-06-11 | Stop reason: HOSPADM

## 2024-06-11 RX ORDER — ALBUTEROL SULFATE 90 UG/1
1-2 AEROSOL, METERED RESPIRATORY (INHALATION) EVERY 4 HOURS PRN
Status: DISCONTINUED | OUTPATIENT
Start: 2024-06-11 | End: 2024-06-14 | Stop reason: HOSPADM

## 2024-06-11 RX ORDER — SODIUM CHLORIDE, SODIUM LACTATE, POTASSIUM CHLORIDE, CALCIUM CHLORIDE 600; 310; 30; 20 MG/100ML; MG/100ML; MG/100ML; MG/100ML
INJECTION, SOLUTION INTRAVENOUS CONTINUOUS
Status: DISCONTINUED | OUTPATIENT
Start: 2024-06-11 | End: 2024-06-11 | Stop reason: HOSPADM

## 2024-06-11 RX ORDER — ONDANSETRON 2 MG/ML
INJECTION INTRAMUSCULAR; INTRAVENOUS PRN
Status: DISCONTINUED | OUTPATIENT
Start: 2024-06-11 | End: 2024-06-11

## 2024-06-11 RX ORDER — ACETAMINOPHEN 325 MG/1
975 TABLET ORAL EVERY 8 HOURS
Qty: 27 TABLET | Refills: 0 | Status: DISCONTINUED | OUTPATIENT
Start: 2024-06-11 | End: 2024-06-12

## 2024-06-11 RX ORDER — LIDOCAINE HYDROCHLORIDE 10 MG/ML
INJECTION, SOLUTION INFILTRATION; PERINEURAL PRN
Status: DISCONTINUED | OUTPATIENT
Start: 2024-06-11 | End: 2024-06-11

## 2024-06-11 RX ORDER — OXYCODONE HYDROCHLORIDE 5 MG/1
5 TABLET ORAL
Status: CANCELLED | OUTPATIENT
Start: 2024-06-11

## 2024-06-11 RX ADMIN — HYDROMORPHONE HYDROCHLORIDE 0.4 MG: 0.2 INJECTION, SOLUTION INTRAMUSCULAR; INTRAVENOUS; SUBCUTANEOUS at 16:08

## 2024-06-11 RX ADMIN — HYDROMORPHONE HYDROCHLORIDE 0.5 MG: 1 INJECTION, SOLUTION INTRAMUSCULAR; INTRAVENOUS; SUBCUTANEOUS at 14:11

## 2024-06-11 RX ADMIN — ONDANSETRON 4 MG: 2 INJECTION INTRAMUSCULAR; INTRAVENOUS at 14:54

## 2024-06-11 RX ADMIN — Medication 2 G: at 12:07

## 2024-06-11 RX ADMIN — ROCURONIUM BROMIDE 20 MG: 10 INJECTION, SOLUTION INTRAVENOUS at 14:11

## 2024-06-11 RX ADMIN — HYDROMORPHONE HYDROCHLORIDE 0.4 MG: 0.2 INJECTION, SOLUTION INTRAMUSCULAR; INTRAVENOUS; SUBCUTANEOUS at 15:50

## 2024-06-11 RX ADMIN — DEXAMETHASONE SODIUM PHOSPHATE 10 MG: 10 INJECTION, SOLUTION INTRAMUSCULAR; INTRAVENOUS at 12:35

## 2024-06-11 RX ADMIN — ZOLPIDEM TARTRATE 5 MG: 5 TABLET ORAL at 22:24

## 2024-06-11 RX ADMIN — HYDROMORPHONE HYDROCHLORIDE 0.4 MG: 0.2 INJECTION, SOLUTION INTRAMUSCULAR; INTRAVENOUS; SUBCUTANEOUS at 20:29

## 2024-06-11 RX ADMIN — HYDROMORPHONE HYDROCHLORIDE 0.4 MG: 0.2 INJECTION, SOLUTION INTRAMUSCULAR; INTRAVENOUS; SUBCUTANEOUS at 16:02

## 2024-06-11 RX ADMIN — MORPHINE SULFATE 2 MG: 4 INJECTION, SOLUTION INTRAMUSCULAR; INTRAVENOUS at 16:59

## 2024-06-11 RX ADMIN — SUGAMMADEX 200 MG: 100 INJECTION, SOLUTION INTRAVENOUS at 15:20

## 2024-06-11 RX ADMIN — BUPIVACAINE HYDROCHLORIDE 20 ML: 2.5 INJECTION, SOLUTION EPIDURAL; INFILTRATION; INTRACAUDAL at 12:24

## 2024-06-11 RX ADMIN — HYDROMORPHONE HYDROCHLORIDE 0.4 MG: 0.2 INJECTION, SOLUTION INTRAMUSCULAR; INTRAVENOUS; SUBCUTANEOUS at 16:25

## 2024-06-11 RX ADMIN — ACETAMINOPHEN 975 MG: 325 TABLET ORAL at 11:14

## 2024-06-11 RX ADMIN — HYDROMORPHONE HYDROCHLORIDE 0.4 MG: 0.2 INJECTION, SOLUTION INTRAMUSCULAR; INTRAVENOUS; SUBCUTANEOUS at 16:40

## 2024-06-11 RX ADMIN — FENTANYL CITRATE 50 MCG: 50 INJECTION INTRAMUSCULAR; INTRAVENOUS at 12:19

## 2024-06-11 RX ADMIN — ROCURONIUM BROMIDE 40 MG: 10 INJECTION, SOLUTION INTRAVENOUS at 13:39

## 2024-06-11 RX ADMIN — HYDROMORPHONE HYDROCHLORIDE 0.4 MG: 0.2 INJECTION, SOLUTION INTRAMUSCULAR; INTRAVENOUS; SUBCUTANEOUS at 16:20

## 2024-06-11 RX ADMIN — FENTANYL CITRATE 50 MCG: 50 INJECTION, SOLUTION INTRAMUSCULAR; INTRAVENOUS at 15:45

## 2024-06-11 RX ADMIN — KETAMINE HYDROCHLORIDE 20 MG: 10 INJECTION INTRAMUSCULAR; INTRAVENOUS at 12:57

## 2024-06-11 RX ADMIN — HYDROMORPHONE HYDROCHLORIDE 0.5 MG: 1 INJECTION, SOLUTION INTRAMUSCULAR; INTRAVENOUS; SUBCUTANEOUS at 12:49

## 2024-06-11 RX ADMIN — CEFAZOLIN SODIUM 2 G: 2 INJECTION, SOLUTION INTRAVENOUS at 20:14

## 2024-06-11 RX ADMIN — OXYCODONE HYDROCHLORIDE 10 MG: 5 TABLET ORAL at 22:30

## 2024-06-11 RX ADMIN — KETAMINE HYDROCHLORIDE 10 MG: 10 INJECTION INTRAMUSCULAR; INTRAVENOUS at 13:38

## 2024-06-11 RX ADMIN — MIDAZOLAM 2 MG: 1 INJECTION INTRAMUSCULAR; INTRAVENOUS at 12:08

## 2024-06-11 RX ADMIN — HYDROMORPHONE HYDROCHLORIDE 0.4 MG: 0.2 INJECTION, SOLUTION INTRAMUSCULAR; INTRAVENOUS; SUBCUTANEOUS at 16:47

## 2024-06-11 RX ADMIN — BUPIVACAINE 20 ML: 13.3 INJECTION, SUSPENSION, LIPOSOMAL INFILTRATION at 12:24

## 2024-06-11 RX ADMIN — FENTANYL CITRATE 50 MCG: 50 INJECTION INTRAMUSCULAR; INTRAVENOUS at 12:49

## 2024-06-11 RX ADMIN — ROCURONIUM BROMIDE 20 MG: 10 INJECTION, SOLUTION INTRAVENOUS at 14:38

## 2024-06-11 RX ADMIN — KETAMINE HYDROCHLORIDE 10 MG: 10 INJECTION INTRAMUSCULAR; INTRAVENOUS at 12:42

## 2024-06-11 RX ADMIN — LIDOCAINE HYDROCHLORIDE 5 ML: 10 INJECTION, SOLUTION INFILTRATION; PERINEURAL at 12:19

## 2024-06-11 RX ADMIN — PROPOFOL 180 MG: 10 INJECTION, EMULSION INTRAVENOUS at 12:19

## 2024-06-11 RX ADMIN — FENTANYL CITRATE 50 MCG: 50 INJECTION INTRAMUSCULAR; INTRAVENOUS at 15:33

## 2024-06-11 RX ADMIN — ROCURONIUM BROMIDE 50 MG: 10 INJECTION, SOLUTION INTRAVENOUS at 12:19

## 2024-06-11 RX ADMIN — FENTANYL CITRATE 25 MCG: 50 INJECTION INTRAMUSCULAR; INTRAVENOUS at 15:12

## 2024-06-11 RX ADMIN — ROCURONIUM BROMIDE 40 MG: 10 INJECTION, SOLUTION INTRAVENOUS at 12:41

## 2024-06-11 RX ADMIN — SODIUM CHLORIDE, POTASSIUM CHLORIDE, SODIUM LACTATE AND CALCIUM CHLORIDE: 600; 310; 30; 20 INJECTION, SOLUTION INTRAVENOUS at 11:03

## 2024-06-11 RX ADMIN — SODIUM CHLORIDE: 9 INJECTION, SOLUTION INTRAVENOUS at 18:11

## 2024-06-11 RX ADMIN — KETAMINE HYDROCHLORIDE 10 MG: 10 INJECTION INTRAMUSCULAR; INTRAVENOUS at 12:19

## 2024-06-11 RX ADMIN — PROPOFOL 20 MG: 10 INJECTION, EMULSION INTRAVENOUS at 12:51

## 2024-06-11 RX ADMIN — HYDROMORPHONE HYDROCHLORIDE 0.4 MG: 0.2 INJECTION, SOLUTION INTRAMUSCULAR; INTRAVENOUS; SUBCUTANEOUS at 15:57

## 2024-06-11 RX ADMIN — ACETAMINOPHEN 975 MG: 325 TABLET ORAL at 22:24

## 2024-06-11 RX ADMIN — ROCURONIUM BROMIDE 30 MG: 10 INJECTION, SOLUTION INTRAVENOUS at 13:15

## 2024-06-11 RX ADMIN — SODIUM CHLORIDE, POTASSIUM CHLORIDE, SODIUM LACTATE AND CALCIUM CHLORIDE: 600; 310; 30; 20 INJECTION, SOLUTION INTRAVENOUS at 13:53

## 2024-06-11 RX ADMIN — HYDROMORPHONE HYDROCHLORIDE 0.4 MG: 0.2 INJECTION, SOLUTION INTRAMUSCULAR; INTRAVENOUS; SUBCUTANEOUS at 16:14

## 2024-06-11 RX ADMIN — FENTANYL CITRATE 25 MCG: 50 INJECTION INTRAMUSCULAR; INTRAVENOUS at 15:14

## 2024-06-11 RX ADMIN — HYDROMORPHONE HYDROCHLORIDE 0.4 MG: 0.2 INJECTION, SOLUTION INTRAMUSCULAR; INTRAVENOUS; SUBCUTANEOUS at 16:31

## 2024-06-11 RX ADMIN — SODIUM CHLORIDE, POTASSIUM CHLORIDE, SODIUM LACTATE AND CALCIUM CHLORIDE: 600; 310; 30; 20 INJECTION, SOLUTION INTRAVENOUS at 15:38

## 2024-06-11 RX ADMIN — OXYCODONE HYDROCHLORIDE 10 MG: 5 TABLET ORAL at 18:10

## 2024-06-11 RX ADMIN — SENNOSIDES AND DOCUSATE SODIUM 1 TABLET: 50; 8.6 TABLET ORAL at 20:14

## 2024-06-11 RX ADMIN — MORPHINE SULFATE 2 MG: 4 INJECTION, SOLUTION INTRAMUSCULAR; INTRAVENOUS at 17:05

## 2024-06-11 RX ADMIN — FENTANYL CITRATE 50 MCG: 50 INJECTION, SOLUTION INTRAMUSCULAR; INTRAVENOUS at 15:38

## 2024-06-11 ASSESSMENT — ACTIVITIES OF DAILY LIVING (ADL)
ADLS_ACUITY_SCORE: 16
ADLS_ACUITY_SCORE: 20
ADLS_ACUITY_SCORE: 18
ADLS_ACUITY_SCORE: 20
ADLS_ACUITY_SCORE: 18
ADLS_ACUITY_SCORE: 18
ADLS_ACUITY_SCORE: 23
ADLS_ACUITY_SCORE: 20
ADLS_ACUITY_SCORE: 23
ADLS_ACUITY_SCORE: 20
ADLS_ACUITY_SCORE: 18
ADLS_ACUITY_SCORE: 18

## 2024-06-11 NOTE — INTERVAL H&P NOTE
"I have reviewed the surgical (or preoperative) H&P that is linked to this encounter, and examined the patient. There are no significant changes    Clinical Conditions Present on Arrival:  Clinically Significant Risk Factors Present on Admission                      # Overweight: Estimated body mass index is 28.27 kg/m  as calculated from the following:    Height as of 5/24/24: 1.854 m (6' 1\").    Weight as of 5/24/24: 97.2 kg (214 lb 4.8 oz).       "

## 2024-06-11 NOTE — OP NOTE
[facility] General Surgery Operative Note    Pre-operative diagnosis: Spinal stenosis [M48.00]  Spondylolisthesis [M43.10]   Post-operative diagnosis: Same   Procedure: Procedure(s):  LUMBAR 5 - SACRAL 1 ANTERIOR LUMBAR INTERBODY FUSION, POSTERIOR INSTRUMENTED FUSION WITH O ARM AND STEALTH NAVIGATION  SURGICAL EXPOSURE, ANTERIOR APPROACH, WITH WOUND CLOSURE, FOR LUMBAR SPINE SURGERY, BY GENERAL SURGERY    Surgeon: Segundo Carr MD   Assistant(s): None   Anesthesia: General with Block    Estimated blood loss: 40 mL   Drains: None   Specimens: * No specimens in log *    Implants: None   Findings: L5-S1 successfully exposed without apparent intraoperative complication   Complications: None   Condition: Stable   Procedure Details: After informed consent was obtained from the patient he was brought to the operating suite and placed the operating table.  Gen. anesthesia was induced and his abdomen was prepped and draped in the usual sterile manner.  A Pfannenstiel incision was made.   We incised the rectus sheath just to the left of midline and identified the peritoneal sac I retracted the rectus muscle laterally and retracted the peritoneal sac medially that is from the left toward the right.  The Bookwalter retractor was placed.  By palpation we identified L5-S1 the bifurcation and great vessel this level was exposed s Once this disc space was cleared a metallic marker was placed in the joint space and an was x-ray obtained.  This showed that we had successfully exposed with a combination of blunt dissection and cautery.  Further dissection was undertaken the vessels were held manually with the Pepito retractor.  Dr. Desir proceded with discectomy and interbody fusion and placement of 1 integrated screw.  This will be dictated separately.  At the conclusion of the case we placed thrombin-soaked Gelfoam patties between the vessels and the implant.  Hemostasis was meticulous.  The fascia was closed  with #1 PDS the subcu was closed with 2-0 Vicryl and 3-0 Monocryl.     Segundo Carr MD

## 2024-06-11 NOTE — PROGRESS NOTES
[facility] Orthopedic Brief Operative Note    Pre-operative diagnosis: Spinal stenosis [M48.00]  Spondylolisthesis [M43.10]   Post-operative diagnosis: Same   Procedure: L5-S1 anterior lumbar interbody fusion and posterior instrumented fusion   Surgeon: Wilfrido Desir   Assistant(s): Nasir Deluna PA-C   Anesthesia: General endotracheal anesthesia   Estimated blood loss: Less than 50 ml   Total IV fluids: (See anesthesia record)   Blood transfusion: No transfusion was given during surgery   Total urine output: (See anesthesia record)   Drains: None   Specimens: None   Implants: Medtronic   Findings: L5-S1 lytic spondylolisthesis and stenosis   Complications: None   Condition: Stable   Weight bearing status: Weight bearing as tolerated   Activity: Activity as tolerated  Patient may move about with assist as indicated or with supervision   Orthotic management: Not applicable   Comments: See dictated operative report for full details     Nasir Deluna PA-C  [facility]    Postop Cares:    - No brace  - No drain  - Weightbearing as tolerated   - Postop antibiotics: Ancef 2g q 8 hours x 2 doses  - Resume prior to admission blood thinners on postop day #5  - Pain service and internal medicine consulted.

## 2024-06-11 NOTE — OR NURSING
Dr. Allen with [facility] read the abdominal x-ray taken intraop and states he does not see any instruments left inside the patient's abdomen.

## 2024-06-11 NOTE — PHARMACY-ADMISSION MEDICATION HISTORY
Pharmacist Admission Medication History    Admission medication history is complete. The information provided in this note is only as accurate as the sources available at the time of the update.    Information Source(s): Patient and CareEverywhere/SureScripts via in-person    Pertinent Information:     Allergies reviewed with patient and updates made in EHR: no    Medication History Completed By: Carlota Joel RPH 6/11/2024 11:17 AM    PTA Med List   Medication Sig Last Dose    albuterol (PROAIR HFA) 90 mcg/actuation inhaler Inhale 1-2 puffs into the lungs every 4 hours as needed Unknown at Pt brought    amphetamine-dextroamphetamine (ADDERALL XR) 15 MG 24 hr capsule Take 1 capsule (15 mg) by mouth daily 6/11/2024 at AM    docusate sodium (COLACE) 100 MG capsule Take 100 mg by mouth daily as needed for constipation 6/11/2024 at AM    fluticasone (FLONASE) 50 MCG/ACT nasal spray Spray 2 sprays into both nostrils daily as needed for rhinitis or allergies Unknown at Not with    montelukast (SINGULAIR) 10 mg tablet Take 10 mg by mouth daily 6/11/2024 at AM    oxyCODONE (ROXICODONE) 5 MG tablet Take 1 tablet (5 mg) by mouth every 6 hours as needed for severe pain 6/11/2024 at AM    sildenafil (VIAGRA) 100 MG tablet Take 1 tablet (100 mg) by mouth daily as needed Unknown    zolpidem (AMBIEN) 5 MG tablet Take 1 tablet (5 mg) by mouth nightly as needed for sleep 6/10/2024 at PM

## 2024-06-11 NOTE — PROCEDURES
June 11, 2024     Attending Surgeon: Wilfrido Desir MD     Co-surgeon for the anterior portion: Segundo Carr MD     Assistant: Ravinder Deluna PA-C.  The assistance of Ravinder BARILLAS was critical and important in the safe performance of the procedure, including positioning, soft tissue and visceral retraction, safe performance of the fusion.  This without the been possible with a scrub tech.     Preoperative diagnosis: 1. L5-S1 lytic spondylolisthesis                                          2.  Severe L5-S1 bilateral foraminal stenosis.                                         3.  Failed conservative management.     Postoperative diagnosis: 1. L5-S1 lytic spondylolisthesis                                          2.  Severe L5-S1 bilateral foraminal stenosis.                                         3.  Failed conservative management.     Procedures performed:      1.  Anterior L5-S1 discectomy, fusion utilizing 2 sponges of bone morphogenic protein and cancellous allograft bone.  2.  Anterior L5-S1 interbody device placement using Medtronic titanium spacer, size 14 mm, medium.  3.  Posterior instrumentation, L5-S1 utilizing Medtronic Solera screws and rods, size 7.5 x 55 mm for both L5 pedicles and 7.5 x 40 mm for both S1 pedicles.      Estimated blood loss: 50 cc.     Complications: None apparent.     Findings: L5-S1 lytic spondylolisthesis and stenosis.     Details of procedure     Introduction: The patient is a very pleasant 64-year-old male who came to the clinic with a longstanding history of low back pain and right lower extremity pain consistent with imaging studies showing L5-S1 lytic spondylolisthesis and stenosis.  After having had nonsurgical management, and with continued pain, we deemed it reasonable to proceed with surgical intervention.  I had a discussion with the patient regarding the nature of the surgery and potential risks, including but not limited to,  death, infection, dural tear, vascular  injury, and nonresolution of symptoms among others, and she accepted and wished to proceed.          The patient was brought to the operating room and placed under general tracheal anesthetic without complications.  Intravenous antibiotics were given within 1 hour of incision.  A Rios catheter was placed.  The patient was then placed supine on the operating table and the lower abdominal area prepped and draped in a routine sterile manner.  After the appropriate timeout, Dr. Segundo Carr performed an anterior retroperitoneal exposure of the L5-S1 segment and this is contained in a separate dictation.  After intraoperative confirmation with fluoroscopy, I used a 15 blade to perform an annulotomy and utilized curettes to perform a near-total discectomy.  I then was able to secure the appropriately sized titanium spacer and filled this with 2 sponges of bone morphogenic protein and cancellous allograft bone and then impacted this into the space with a good fit.  I then placed a screw through the implant and into the superior endplate of S1 to prevent backing out.  Intraoperative imaging showed good placement of the implants.  We then performed copious irrigation and hemostasis.  Layer by layer closure was then performed by Dr. Carr and again this is contained in a separate dictation.  After the anterior procedure, the patient was deemed stable enough to to undergo the posterior procedure.     The patient was then placed prone on a Ronal table ensuring that all nerve areas and bony areas were well-padded and free of pressure.  The low back area was then prepped and draped in routine sterile manner.  After the appropriate timeout, I placed a percutaneous pin into the right posterior superior iliac spine.  I then brought in the O-arm and performed intraoperative scans which were then registered onto the [employer] navigation station.  Utilizing Stealth navigation, I then was able to percutaneously create tracts into  the pedicles of L5 and S1 bilaterally and then performed navigated tapping and navigated placement of appropriately sized screws.  Intraoperative imaging showed good placement of the implants.  I was then able to percutaneously pass rods through the screw heads bilaterally and then placed setscrews to secure the construct.  I then removed the reference frame and broke out the extenders.  I then performed copious irrigation and hemostasis.  I then performed layer by layer closure utilizing Vicryl O for the fascia, Vicryl 2O for the subcutaneous tissues, and Vicryl 3O for the skin.  Half percent Marcaine with epinephrine was then injected into the skin and subcutaneous tissues.  Steri-Strips were applied followed by sterile dressing.  He was then placed supine  on his bed and extubated without complications and brought to the recovery room in satisfactory condition     Postoperative plan: The patient is to mobilize as tolerated.  No bending twisting or lifting greater than 10 pounds for the next 6 weeks.  After discharge, he will be seen back in clinic in 6 weeks.

## 2024-06-11 NOTE — ANESTHESIA PROCEDURE NOTES
Airway       Patient location during procedure: OR       Procedure Start/Stop Times: 6/11/2024 12:22 PM  Staff -        Anesthesiologist:  Segundo Jolley MD       CRNA: Justine Viera APRN CRNA       Performed By: CRNA  Consent for Airway        Urgency: elective  Indications and Patient Condition       Indications for airway management: alejandro-procedural       Induction type:intravenous       Mask difficulty assessment: 1 - vent by mask    Final Airway Details       Final airway type: endotracheal airway       Successful airway: ETT - single  Endotracheal Airway Details        ETT size (mm): 8.0       Cuffed: yes       Successful intubation technique: direct laryngoscopy       DL Blade Type: MAC 4       Grade View of Cords: 1       Adjucts: stylet       Measured from: gums/teeth       Secured at (cm): 22       Bite block used: Soft    Post intubation assessment        Placement verified by: capnometry, equal breath sounds and chest rise        Number of attempts at approach: 1       Number of other approaches attempted: 0       Secured with: tape       Ease of procedure: easy       Dentition: Intact and Unchanged    Medication(s) Administered   Medication Administration Time: 6/11/2024 12:22 PM

## 2024-06-11 NOTE — ANESTHESIA CARE TRANSFER NOTE
Patient: Segundo Sellers    Procedure: Procedure(s):  LUMBAR 5 - SACRAL 1 ANTERIOR LUMBAR INTERBODY FUSION, POSTERIOR INSTRUMENTED FUSION WITH O ARM AND STEALTH NAVIGATION  SURGICAL EXPOSURE, ANTERIOR APPROACH, WITH WOUND CLOSURE, FOR LUMBAR SPINE SURGERY, BY GENERAL SURGERY       Diagnosis: Spinal stenosis [M48.00]  Spondylolisthesis [M43.10]  Diagnosis Additional Information: No value filed.    Anesthesia Type:   General     Note:    Oropharynx: oropharynx clear of all foreign objects and spontaneously breathing  Level of Consciousness: awake  Oxygen Supplementation: face mask  Level of Supplemental Oxygen (L/min / FiO2): 8  Independent Airway: airway patency satisfactory and stable  Dentition: dentition unchanged  Vital Signs Stable: post-procedure vital signs reviewed and stable  Report to RN Given: handoff report given  Patient transferred to: PACU    Handoff Report: Identifed the Patient, Identified the Reponsible Provider, Reviewed the pertinent medical history, Discussed the surgical course, Reviewed Intra-OP anesthesia mangement and issues during anesthesia, Set expectations for post-procedure period and Allowed opportunity for questions and acknowledgement of understanding      Vitals:  Vitals Value Taken Time   /88 06/11/24 1534   Temp 36.3  C (97.3  F) 06/11/24 1532   Pulse 71 06/11/24 1536   Resp 11 06/11/24 1536   SpO2 99 % 06/11/24 1536   Vitals shown include unfiled device data.    Electronically Signed By: AURORA Romano CRNA  June 11, 2024  3:37 PM

## 2024-06-11 NOTE — ANESTHESIA PREPROCEDURE EVALUATION
Anesthesia Pre-Procedure Evaluation    Patient: Segundo Sellers   MRN: [id number] : 1960        Procedure : Procedure(s):  LUMBAR 5 - SACRAL 1 ANTERIOR LUMBAR INTERBODY FUSION, POSTERIOR INSTRUMENTED FUSION WITH O ARM AND STEALTH NAVIGATION  SURGICAL EXPOSURE, ANTERIOR APPROACH, WITH WOUND CLOSURE, FOR LUMBAR SPINE SURGERY, BY GENERAL SURGERY          Past Medical History:   Diagnosis Date    Abdominal pain 2020    Allergic rhinitis     Created by Conversion Replacement Utility updated for latest IMO load     Asthma     Created by Conversion     Benign Prostatic Hypertrophy     Created by Conversion     Current moderate episode of major depressive disorder without prior episode (H) 2020    Dyslipidemia 10/29/2021    Formatting of this note might be different from the original. Created by Conversion    Dyslipidemia     Created by Conversion     Eczema     Created by Conversion     Lethargy     Created by Conversion     Lower urinary tract symptoms 2020    Pelvic and perineal pain 2020    Slowing of urinary stream 2020      Past Surgical History:   Procedure Laterality Date    ARTHROSCOPY KNEE Left     DAVINCI XI HERNIORRHAPHY INGUINAL Right 2023    Procedure: HERNIORRHAPHY, INGUINAL, ROBOT-ASSISTED, LAPAROSCOPIC, USING DA AVEL XI;  Surgeon: Neftaly Rm MD;  Location: [facility] Main OR      No Known Allergies   Social History     Tobacco Use    Smoking status: Never     Passive exposure: Never    Smokeless tobacco: Former     Types: Chew   Substance Use Topics    Alcohol use: Yes     Alcohol/week: 2.0 standard drinks of alcohol      Wt Readings from Last 1 Encounters:   24 97.2 kg (214 lb 4.8 oz)        Anesthesia Evaluation            ROS/MED HX  ENT/Pulmonary:     (+)                      asthma                  Neurologic:  - neg neurologic ROS     Cardiovascular:     (+) Dyslipidemia - -   -  - -                                   (-) murmur  "  METS/Exercise Tolerance:     Hematologic:  - neg hematologic  ROS     Musculoskeletal: Comment: Low back pain      GI/Hepatic:  - neg GI/hepatic ROS     Renal/Genitourinary:  - neg Renal ROS     Endo:     (+)               Obesity,       Psychiatric/Substance Use:     (+)    H/O chronic opiod use .     Infectious Disease:  - neg infectious disease ROS     Malignancy:  - neg malignancy ROS     Other:      (+)  , H/O Chronic Pain,         Physical Exam    Airway  airway exam normal      Mallampati: I   TM distance: > 3 FB   Neck ROM: full   Mouth opening: > 3 cm    Respiratory Devices and Support         Dental       (+) Minor Abnormalities - some fillings, tiny chips      Cardiovascular   cardiovascular exam normal       Rhythm and rate: regular and normal (-) no murmur    Pulmonary   pulmonary exam normal        breath sounds clear to auscultation           OUTSIDE LABS:  CBC:   Lab Results   Component Value Date    WBC 4.5 05/24/2024    WBC 5.0 07/26/2023    HGB 14.6 05/24/2024    HGB 14.5 07/26/2023    HCT 42.8 05/24/2024    HCT 42.9 07/26/2023     05/24/2024     07/26/2023     BMP:   Lab Results   Component Value Date     05/24/2024     07/26/2023    POTASSIUM 4.6 05/24/2024    POTASSIUM 4.6 07/26/2023    CHLORIDE 102 05/24/2024    CHLORIDE 103 07/26/2023    CO2 26 05/24/2024    CO2 26 07/26/2023    BUN 20.1 05/24/2024    BUN 20.1 07/26/2023    CR 1.04 05/24/2024    CR 0.98 07/26/2023     (H) 05/24/2024     (H) 07/26/2023     COAGS: No results found for: \"PTT\", \"INR\", \"FIBR\"  POC: No results found for: \"BGM\", \"HCG\", \"HCGS\"  HEPATIC: No results found for: \"ALBUMIN\", \"PROTTOTAL\", \"ALT\", \"AST\", \"GGT\", \"ALKPHOS\", \"BILITOTAL\", \"BILIDIRECT\", \"MARYJANE\"  OTHER:   Lab Results   Component Value Date    A1C 5.7 (H) 05/24/2024    ANGELA 9.5 05/24/2024       Anesthesia Plan    ASA Status:  2    NPO Status:  NPO Appropriate    Anesthesia Type: General.     - Airway: ETT   Induction: " "Intravenous.   Maintenance: Balanced.        Consents    Anesthesia Plan(s) and associated risks, benefits, and realistic alternatives discussed. Questions answered and patient/representative(s) expressed understanding.     - Discussed: Risks, Benefits and Alternatives for BOTH SEDATION and the PROCEDURE were discussed     - Discussed with:  Patient            Postoperative Care    Pain management: IV analgesics, Oral pain medications, Peripheral nerve block (Single Shot).   PONV prophylaxis: Ondansetron (or other 5HT-3), Dexamethasone or Solumedrol     Comments:               Segundo Jolley MD    I have reviewed the pertinent notes and labs in the chart from the past 30 days and (re)examined the patient.  Any updates or changes from those notes are reflected in this note.              # Overweight: Estimated body mass index is 28.27 kg/m  as calculated from the following:    Height as of 5/24/24: 1.854 m (6' 1\").    Weight as of 5/24/24: 97.2 kg (214 lb 4.8 oz).      "

## 2024-06-11 NOTE — ANESTHESIA PROCEDURE NOTES
TAP Procedure Note    Pre-Procedure   Staff -        Anesthesiologist:  Segundo Jolley MD       Performed By: anesthesiologist       Location: OR       Procedure Start/Stop Times: 6/11/2024 12:24 PM and 6/11/2024 12:26 PM       Pre-Anesthestic Checklist: patient identified, IV checked, site marked, risks and benefits discussed, informed consent, monitors and equipment checked, pre-op evaluation, at physician/surgeon's request and post-op pain management  Timeout:       Correct Patient: Yes        Correct Procedure: Yes        Correct Site: Yes        Correct Position: Yes        Correct Laterality: Yes        Site Marked: Yes  Procedure Documentation  Procedure: TAP       Diagnosis: POST OPERATIVE PAIN       Laterality: bilateral       Patient Position: supine       Patient Prep/Sterile Barriers: sterile gloves, mask       Skin prep: Chloraprep       Needle Type: short bevel       Needle Gauge: 21.        Needle Length (millimeters): 110        Ultrasound guided       1. Ultrasound was used to identify targeted nerve, plexus, vascular marker, or fascial plane and place a needle adjacent to it in real-time.       2. Ultrasound was used to visualize the spread of anesthetic in close proximity to the above referenced structure.       3. A permanent image is entered into the patient's record.    Assessment/Narrative         The placement was negative for: blood aspirated, painful injection and site bleeding       Paresthesias: No.       Bolus given via needle..        Secured via.        Insertion/Infusion Method: Single Shot       Complications: none       Injection made incrementally with aspirations every 5 mL.    Medication(s) Administered   Bupivacaine 0.25% PF (Infiltration) - Infiltration   20 mL - 6/11/2024 12:24:00 PM  Bupivacaine liposome (Exparel) 1.3% LA inj susp (Infiltration) - Infiltration   20 mL - 6/11/2024 12:24:00 PM  Medication Administration Time: 6/11/2024 12:24 PM      FOR [facility]  "([facility]) ONLY:   Pain Team Contact information: please page the Pain Team Via [employer]. Search \"Pain\". During daytime hours, please page the attending first. At night please page the resident first.      "

## 2024-06-11 NOTE — ANESTHESIA POSTPROCEDURE EVALUATION
Patient: Segundo Sellers    Procedure: Procedure(s):  LUMBAR 5 - SACRAL 1 ANTERIOR LUMBAR INTERBODY FUSION, POSTERIOR INSTRUMENTED FUSION WITH O ARM AND STEALTH NAVIGATION  SURGICAL EXPOSURE, ANTERIOR APPROACH, WITH WOUND CLOSURE, FOR LUMBAR SPINE SURGERY, BY GENERAL SURGERY       Anesthesia Type:  General    Note:  Disposition: Inpatient   Postop Pain Control: Uneventful            Sign Out: Well controlled pain (agressively treated)   PONV: No   Neuro/Psych: Uneventful            Sign Out: Acceptable/Baseline neuro status   Airway/Respiratory: Uneventful            Sign Out: Acceptable/Baseline resp. status   CV/Hemodynamics: Uneventful            Sign Out: Acceptable CV status; No obvious hypovolemia; No obvious fluid overload   Other NRE: NONE   DID A NON-ROUTINE EVENT OCCUR? No           Last vitals:  Vitals Value Taken Time   /71 06/11/24 1710   Temp 36.4  C (97.5  F) 06/11/24 1710   Pulse 51 06/11/24 1718   Resp 22 06/11/24 1718   SpO2 98 % 06/11/24 1718   Vitals shown include unfiled device data.    Electronically Signed By: Teto Lloyd MD  June 11, 2024  5:53 PM

## 2024-06-12 ENCOUNTER — APPOINTMENT (OUTPATIENT)
Dept: OCCUPATIONAL THERAPY | Facility: CLINIC | Age: 64
End: 2024-06-12
Attending: PHYSICIAN ASSISTANT
Payer: COMMERCIAL

## 2024-06-12 ENCOUNTER — APPOINTMENT (OUTPATIENT)
Dept: PHYSICAL THERAPY | Facility: CLINIC | Age: 64
End: 2024-06-12
Attending: PHYSICIAN ASSISTANT
Payer: COMMERCIAL

## 2024-06-12 PROCEDURE — 97166 OT EVAL MOD COMPLEX 45 MIN: CPT | Mod: GO

## 2024-06-12 PROCEDURE — 97161 PT EVAL LOW COMPLEX 20 MIN: CPT | Mod: GP

## 2024-06-12 PROCEDURE — 250N000011 HC RX IP 250 OP 636: Mod: JZ | Performed by: PHYSICIAN ASSISTANT

## 2024-06-12 PROCEDURE — 250N000013 HC RX MED GY IP 250 OP 250 PS 637: Performed by: NURSE PRACTITIONER

## 2024-06-12 PROCEDURE — 96376 TX/PRO/DX INJ SAME DRUG ADON: CPT

## 2024-06-12 PROCEDURE — G0378 HOSPITAL OBSERVATION PER HR: HCPCS

## 2024-06-12 PROCEDURE — 99232 SBSQ HOSP IP/OBS MODERATE 35: CPT | Performed by: INTERNAL MEDICINE

## 2024-06-12 PROCEDURE — 250N000013 HC RX MED GY IP 250 OP 250 PS 637: Performed by: INTERNAL MEDICINE

## 2024-06-12 PROCEDURE — 120N000013 HC R&B IMCU

## 2024-06-12 PROCEDURE — 99254 IP/OBS CNSLTJ NEW/EST MOD 60: CPT | Performed by: NURSE PRACTITIONER

## 2024-06-12 PROCEDURE — 97535 SELF CARE MNGMENT TRAINING: CPT | Mod: GO

## 2024-06-12 PROCEDURE — 250N000013 HC RX MED GY IP 250 OP 250 PS 637: Performed by: PHYSICIAN ASSISTANT

## 2024-06-12 PROCEDURE — 97116 GAIT TRAINING THERAPY: CPT | Mod: GP

## 2024-06-12 RX ORDER — ACETAMINOPHEN 325 MG/1
975 TABLET ORAL EVERY 6 HOURS
Status: DISCONTINUED | OUTPATIENT
Start: 2024-06-12 | End: 2024-06-14 | Stop reason: HOSPADM

## 2024-06-12 RX ORDER — GABAPENTIN 100 MG/1
200 CAPSULE ORAL 2 TIMES DAILY
Status: DISCONTINUED | OUTPATIENT
Start: 2024-06-12 | End: 2024-06-13

## 2024-06-12 RX ORDER — METHOCARBAMOL 750 MG/1
750 TABLET, FILM COATED ORAL 4 TIMES DAILY
Status: DISCONTINUED | OUTPATIENT
Start: 2024-06-12 | End: 2024-06-13

## 2024-06-12 RX ADMIN — SENNOSIDES AND DOCUSATE SODIUM 1 TABLET: 50; 8.6 TABLET ORAL at 21:02

## 2024-06-12 RX ADMIN — OXYCODONE HYDROCHLORIDE 10 MG: 5 TABLET ORAL at 11:24

## 2024-06-12 RX ADMIN — THERA TABS 1 TABLET: TAB at 09:22

## 2024-06-12 RX ADMIN — POLYETHYLENE GLYCOL 3350 17 G: 17 POWDER, FOR SOLUTION ORAL at 09:20

## 2024-06-12 RX ADMIN — GABAPENTIN 200 MG: 100 CAPSULE ORAL at 21:02

## 2024-06-12 RX ADMIN — METHOCARBAMOL 750 MG: 750 TABLET, FILM COATED ORAL at 14:15

## 2024-06-12 RX ADMIN — DOCUSATE SODIUM 100 MG: 100 CAPSULE, LIQUID FILLED ORAL at 17:07

## 2024-06-12 RX ADMIN — METHOCARBAMOL 750 MG: 750 TABLET, FILM COATED ORAL at 21:02

## 2024-06-12 RX ADMIN — METHOCARBAMOL 750 MG: 750 TABLET, FILM COATED ORAL at 10:29

## 2024-06-12 RX ADMIN — METHOCARBAMOL 750 MG: 750 TABLET, FILM COATED ORAL at 18:54

## 2024-06-12 RX ADMIN — OXYCODONE HYDROCHLORIDE 10 MG: 5 TABLET ORAL at 17:07

## 2024-06-12 RX ADMIN — CEFAZOLIN SODIUM 2 G: 2 INJECTION, SOLUTION INTRAVENOUS at 06:11

## 2024-06-12 RX ADMIN — OXYCODONE HYDROCHLORIDE 10 MG: 5 TABLET ORAL at 06:55

## 2024-06-12 RX ADMIN — ACETAMINOPHEN 975 MG: 325 TABLET ORAL at 06:11

## 2024-06-12 RX ADMIN — CYCLOBENZAPRINE 10 MG: 10 TABLET, FILM COATED ORAL at 02:06

## 2024-06-12 RX ADMIN — GABAPENTIN 200 MG: 100 CAPSULE ORAL at 10:29

## 2024-06-12 RX ADMIN — ACETAMINOPHEN 975 MG: 325 TABLET ORAL at 11:24

## 2024-06-12 RX ADMIN — ACETAMINOPHEN 975 MG: 325 TABLET ORAL at 18:54

## 2024-06-12 RX ADMIN — SENNOSIDES AND DOCUSATE SODIUM 1 TABLET: 50; 8.6 TABLET ORAL at 09:23

## 2024-06-12 RX ADMIN — OXYCODONE HYDROCHLORIDE 10 MG: 5 TABLET ORAL at 02:55

## 2024-06-12 ASSESSMENT — ACTIVITIES OF DAILY LIVING (ADL)
ADLS_ACUITY_SCORE: 25
ADLS_ACUITY_SCORE: 26
ADLS_ACUITY_SCORE: 26
ADLS_ACUITY_SCORE: 25
ADLS_ACUITY_SCORE: 23
ADLS_ACUITY_SCORE: 26
ADLS_ACUITY_SCORE: 26
ADLS_ACUITY_SCORE: 25
ADLS_ACUITY_SCORE: 25
ADLS_ACUITY_SCORE: 26
ADLS_ACUITY_SCORE: 25
ADLS_ACUITY_SCORE: 23
ADLS_ACUITY_SCORE: 25
ADLS_ACUITY_SCORE: 25
PREVIOUS_RESPONSIBILITIES: MEAL PREP;HOUSEKEEPING;LAUNDRY;SHOPPING;YARDWORK;MEDICATION MANAGEMENT;FINANCES;DRIVING
ADLS_ACUITY_SCORE: 26
ADLS_ACUITY_SCORE: 25
ADLS_ACUITY_SCORE: 26

## 2024-06-12 NOTE — PLAN OF CARE
Goal Outcome Evaluation:    Patient vital signs are at baseline: Yes  Patient able to ambulate as they were prior to admission or with assist devices provided by therapies during their stay:  Yes  Patient MUST void prior to discharge:  Yes  Patient able to tolerate oral intake:  Yes  Pain has adequate pain control using Oral analgesics:  Yes  Does patient have an identified :  Yes  Has goal D/C date and time been discussed with patient:  Yes    VSS. PRN oxy and flexeril used to manage pain. Ambulating SBA w/ gb and cane. Refused bed alarm, calls appropriately. Passed PVR x3. Dressing CDI posterior, dried drainage with no change anterior. Mady full liquid diet, BS active, no flatus yet.

## 2024-06-12 NOTE — PROGRESS NOTES
06/12/24 0900   Appointment Info   Signing Clinician's Name / Credentials (PT) Reno Lock, PT, DPT   Living Environment   People in Home alone   Current Living Arrangements house   Home Accessibility stairs within home   Number of Stairs, Within Home, Primary greater than 10 stairs   Self-Care   Usual Activity Tolerance excellent   Current Activity Tolerance good   Equipment Currently Used at Home none   Fall history within last six months no   General Information   Onset of Illness/Injury or Date of Surgery 06/11/24   Referring Physician Dr. Desir   Patient/Family Therapy Goals Statement (PT) Decrease pain with mobility   Pertinent History of Current Problem (include personal factors and/or comorbidities that impact the POC) s/p Lumbar/sacral fusion   Existing Precautions/Restrictions spinal;lifting;weight bearing   Weight-Bearing Status - LLE weight-bearing as tolerated   Weight-Bearing Status - RLE weight-bearing as tolerated   Range of Motion (ROM)   ROM Comment WFL   Strength (Manual Muscle Testing)   Strength Comments WFL   Transfers   Transfers sit-stand transfer   Sit-Stand Transfer   Sit-Stand [place] (Transfers) supervision   Assistive Device (Sit-Stand Transfers) cane, straight   Gait/Stairs (Locomotion)   [place] Level (Gait) supervision   Assistive Device (Gait) cane, straight   Distance in Feet (Gait) 10'   Pattern (Gait) step-through   Deviations/Abnormal Patterns (Gait) brady decreased;stride length decreased   Comment, (Gait/Stairs) Up/down 4 stairs   Clinical Impression   Criteria for Skilled Therapeutic Intervention Yes, treatment indicated   PT Diagnosis (PT) impaired functional mobility   Influenced by the following impairments pain, weakness   Functional limitations due to impairments gait, stairs, transfers   Clinical Presentation (PT Evaluation Complexity) stable   Clinical Presentation Rationale pt presents as medically diagnosed   Clinical Decision Making (Complexity) low  complexity   Planned Therapy Interventions (PT) gait training;home exercise program;patient/family education;ROM (range of motion);stair training;strengthening;transfer training   Risk & Benefits of therapy have been explained care plan/treatment goals reviewed;patient   PT Total Evaluation Time   PT Eval, Low Complexity Minutes (34878) 10   Physical Therapy Goals   PT Frequency One time eval and treatment only   PT Predicted Duration/Target Date for Goal Attainment 06/12/24   PT Goals PT Goal 1;Gait;Transfers;Stairs   PT: Transfers Modified independent;Sit to/from stand;Assistive device;Goal Met   PT: Gait Modified independent;150 feet;Straight cane;Goal Met   PT: Stairs Supervision/stand-by assist;4 stairs;Rail on both sides;Goal Met   PT: Goal 1 Independent with spinal HEP for LE strengthening, Goal Met   Interventions   Interventions Quick Adds Therapeutic Procedure;Therapeutic Activity;Gait Training   Therapeutic Procedure/Exercise   Ther. Procedure: strength, endurance, ROM, flexibillity Minutes (64688) 5   Symptoms Noted During/After Treatment none   Treatment Detail/Skilled Intervention Reclined and Standing spine protocol therex x10 reps, bilateral UE support, cueing for technique, Independent with written HEP following education   Therapeutic Activity   Therapeutic Activities: dynamic activities to improve functional performance Minutes (29788) 3   Symptoms Noted During/After Treatment None   Treatment Detail/Skilled Intervention sit to/from stand, cueing for safe hand placement, Mod I following education, reviewed spinal precautions, independent in the bathroom.   Gait Training   Gait Training Minutes (22646) 10   Symptoms Noted During/After Treatment (Gait Training) none   Treatment Detail/Skilled Intervention Pt amb well to the stairs, around the unit, SBA with SEC, no concerns in this area. Educated on importance of mobility, safety, AD use.   Distance in Feet 350'   [place] Level (Gait Training)  stand-by assist   Physical Assistance Level (Gait Training) supervision;verbal cues   Weight Bearing (Gait Training) weight-bearing as tolerated   Assistive Device (Gait Training) straight cane   Pattern Analysis (Gait Training) swing-through gait   Gait Analysis Deviations decreased brady;decreased step length   Impairments (Gait Analysis/Training) strength decreased   Stair Railings present at both sides   Physical Assist/Nonphysical Assist (Stairs) verbal cues;supervision   Level of [place] (Stairs) contact guard   PT Discharge Planning   PT Plan d/c PT   PT Discharge Recommendation (DC Rec) (S)  home with assist   PT Rationale for DC Rec Neighbors and sister to assist as needed, pt mobilizing very well, no concerns in this area.   PT Brief overview of current status SBA transfers and amb 350' with SEC

## 2024-06-12 NOTE — PLAN OF CARE
Problem: Spinal Surgery  Goal: Optimal Coping with Surgery  Outcome: Progressing  Goal: Absence of Bleeding  Outcome: Progressing  Intervention: Enhance Bowel Motility and Elimination  Recent Flowsheet Documentation  Taken 6/12/2024 1713 by Amanda Diaz RN  Bowel Motility Enhancement:   ambulation promoted   fluid intake encouraged   oral intake encouraged  Bowel Elimination Management:   sitting position facilitated   toileting offered  Goal: Fluid and Electrolyte Balance  Outcome: Progressing  Goal: Optimal Functional Ability  Outcome: Progressing  Goal: Absence of Infection Signs and Symptoms  Outcome: Progressing  Goal: Optimal Neurologic Function  Outcome: Progressing  Goal: Anesthesia/Sedation Recovery  Intervention: Optimize Anesthesia Recovery  Recent Flowsheet Documentation  Taken 6/12/2024 1713 by Amanda Diaz RN  Safety Promotion/Fall Prevention:   safety round/check completed   assistive device/personal items within reach   clutter free environment maintained   increased rounding and observation   increase visualization of patient   lighting adjusted   nonskid shoes/slippers when out of bed   patient and family education  Goal: Optimal Pain Control and Function  Outcome: Progressing  Intervention: Prevent or Manage Pain  Recent Flowsheet Documentation  Taken 6/12/2024 1707 by Amanda Diaz RN  Pain Management Interventions:   medication (see MAR)   breathing exercises   care clustered   cold applied   pain management plan reviewed with patient/caregiver   pillow support provided   rest   repositioned    Patient vital signs are at baseline: Yes  Patient able to ambulate as they were prior to admission or with assist devices provided by therapies during their stay:  Yes  Patient MUST void prior to discharge:  Yes. Patient has not passed gas yet or had a bowel movement.   Patient able to tolerate oral intake:  Yes  Pain has adequate pain control using Oral analgesics:  Yes  Does patient have an  identified :  Yes  Has goal D/C date and time been discussed with patient:  Yes

## 2024-06-12 NOTE — CONSULTS
[facility]   [facility], [facility], [facility], [facility], [place]     Date of Admission:  6/11/2024  Date of Consult (When I saw the patient): 06/12/24     Assessment/Plan:     Segundo Sellers is a 64 year old male who was admitted on 6/11/2024.  Pain team was asked to see the patient for post op pain.  Admitted for planned procedure. History of BPH, allergic rhinitis, asthma, insomnia.  Describes pain as 6-8/10 and aching, burning in the low back. The patient does not smoke and denies chemical dependency history.     Post op day: 1 Day Post-Op. L5-S1 foraminal stenosis, spondylolisthesis s/p anterior L5-S1 discectomy, fusion     PLAN:   1) Pain is consistent with post op pain  Multimodal Medication Therapy  Topical: consider   NSAID'S: CrCl 96.7. None, post fusion   Steroids: none   Muscle Relaxants: robaxin qid   Adjuvants: APAP, add Gabapentin 200 mg bid   Antidepressants/anxiolytics: Ambien prn    Opioids: Oxycodone prn   IV Pain medication: dilaudid prn   Non-medication interventions: Ice, Rest, PT, OT, and Distraction (TV, Music, Reading)  Constipation Prophylaxis: Senna-docusate and Miralax    -Opioid prescriber has been Primary Care Provider, Segundo FERNANDEZ  pulled from system on 6/12/24. This indicates monthly prescriptions for Oxycodone, Adderall, Ambien   6/6/24 Oxycodone 5 mg #120 for 30 days   6/6/24 Adderall 15 mg #30 for 30 days   6/6/24 Ambien 5 mg #30 for 30 days  Discharge Recommendations - We recommend prescribing the following at the time of discharge: per Orthopedics      History of Present Illness (HPI):       Segundo Sellers is a 64 year old male who presented for planned procedure.  Past medical history as above. The pain is reported to be acute, chronic, located in the low back, and it does radiate to BLEs.  Current pain is rated at 6-8/10 and goal is 2/10.  The patient denies nausea, vomiting, diarrhea, chest pain, shortness of breath,  dizziness, fever, and chills. The patient reports passing flatus and no BM yet.     Per [place]  review, the patient does have an opioid tolerance. Opioid induced side effects noted and include: constipation     Reviewed medical record, labs, imaging, ED note, and care everywhere.     Medical History   PAST MEDICAL HISTORY:   Past Medical History:   Diagnosis Date    Abdominal pain 02/28/2020    Allergic rhinitis     Created by Conversion Replacement Utility updated for latest IMO load     Asthma     Created by Conversion     Benign Prostatic Hypertrophy     Created by Conversion     Current moderate episode of major depressive disorder without prior episode (H) 03/16/2020    Dyslipidemia 10/29/2021    Formatting of this note might be different from the original. Created by Conversion    Dyslipidemia     Created by Conversion     Eczema     Created by Conversion     Lethargy     Created by Conversion     Lower urinary tract symptoms 02/28/2020    Pelvic and perineal pain 04/22/2020    Slowing of urinary stream 02/28/2020       PAST SURGICAL HISTORY:   Past Surgical History:   Procedure Laterality Date    ARTHROSCOPY KNEE Left     DAVINCI XI HERNIORRHAPHY INGUINAL Right 8/2/2023    Procedure: HERNIORRHAPHY, INGUINAL, ROBOT-ASSISTED, LAPAROSCOPIC, USING DA AVEL XI;  Surgeon: Neftaly Rm MD;  Location: [facility] Main OR    FUSION, SPINE, LUMBAR, 1 LEVEL, COMBINED ANTERIOR AND POSTERIOR APPROACHES, W/INST, USING OTS N/A 6/11/2024    Procedure: LUMBAR 5 - SACRAL 1 ANTERIOR LUMBAR INTERBODY FUSION, POSTERIOR INSTRUMENTED FUSION WITH O ARM AND STEALTH NAVIGATION;  Surgeon: Wilfrido Desir MD;  Location: [facility] OR    SURGICAL EXPOSURE, ANTERIOR APPROACH, W/WOUND CLOSURE, FOR LUMBAR SPINE SURGERY, BY GENERAL SURGERY N/A 6/11/2024    Procedure: SURGICAL EXPOSURE, ANTERIOR APPROACH, WITH WOUND CLOSURE, FOR LUMBAR SPINE SURGERY, BY GENERAL SURGERY;  Surgeon: Wilfrido Desir MD;   Location: [facility] Main OR       FAMILY HISTORY:   Family History   Problem Relation Age of Onset    No Known Problems Mother     Hypertension Father        SOCIAL HISTORY:   Social History     Tobacco Use    Smoking status: Never     Passive exposure: Never    Smokeless tobacco: Former     Types: Chew   Substance Use Topics    Alcohol use: Yes     Alcohol/week: 2.0 standard drinks of alcohol     Types: 2 Standard drinks or equivalent per week     Comment: 4 per week        HEALTH & LIFESTYLE PRACTICES  Tobacco:  reports that he has never smoked. He has never been exposed to tobacco smoke. He has quit using smokeless tobacco.  His smokeless tobacco use included chew.  Alcohol:  reports current alcohol use of about 2.0 standard drinks of alcohol per week.  Illicit drugs:  reports no history of drug use.    Allergies  No Known Allergies    Problem List  Patient Active Problem List    Diagnosis Date Noted    Spondylolisthesis of lumbar region 06/11/2024     Priority: Medium    Dyslipidemia 10/29/2021     Priority: Medium     Formatting of this note might be different from the original.  Created by Conversion      Pelvic and perineal pain 04/22/2020     Priority: Medium    Current moderate episode of major depressive disorder without prior episode (H) 03/16/2020     Priority: Medium    Slowing of urinary stream 02/28/2020     Priority: Medium    Abdominal pain 02/28/2020     Priority: Medium    Lower urinary tract symptoms 02/28/2020     Priority: Medium    Asthma      Priority: Medium     Created by Conversion        Allergic Rhinitis      Priority: Medium     Created by Conversion  Replacement Utility updated for latest IMO load        Eczema      Priority: Medium     Created by Conversion        Dyslipidemia      Priority: Medium     Created by Conversion        Benign Prostatic Hypertrophy      Priority: Medium     Created by Conversion        Lethargy      Priority: Medium     Created by Conversion        Impaired  "Fasting Glucose      Priority: Medium     Created by Conversion           Prior to Admission Medications   Medications Prior to Admission   Medication Sig Dispense Refill Last Dose    albuterol (PROAIR HFA) 90 mcg/actuation inhaler Inhale 1-2 puffs into the lungs every 4 hours as needed   Unknown at Pt brought    amphetamine-dextroamphetamine (ADDERALL XR) 15 MG 24 hr capsule Take 1 capsule (15 mg) by mouth daily 30 capsule 0 6/11/2024 at AM    docusate sodium (COLACE) 100 MG capsule Take 100 mg by mouth daily as needed for constipation   6/11/2024 at AM    fluticasone (FLONASE) 50 MCG/ACT nasal spray Spray 2 sprays into both nostrils daily as needed for rhinitis or allergies   Unknown at Not with    montelukast (SINGULAIR) 10 mg tablet Take 10 mg by mouth daily   6/11/2024 at AM    oxyCODONE (ROXICODONE) 5 MG tablet Take 1 tablet (5 mg) by mouth every 6 hours as needed for severe pain 120 tablet 0 6/11/2024 at AM    sildenafil (VIAGRA) 100 MG tablet Take 1 tablet (100 mg) by mouth daily as needed 30 tablet 1 Unknown    zolpidem (AMBIEN) 5 MG tablet Take 1 tablet (5 mg) by mouth nightly as needed for sleep 30 tablet 0 6/10/2024 at PM       Review of Systems  Complete ROS reviewed, unless noted in HPI, all other systems reviewed (with patient) and all others found to be negative.      Objective:     Physical Exam:  /79 (BP Location: Right arm, Patient Position: Supine, Cuff Size: Adult Regular)   Pulse 61   Temp 97.9  F (36.6  C) (Oral)   Resp 18   Ht 1.854 m (6' 1\")   Wt 95.3 kg (210 lb)   SpO2 97%   BMI 27.71 kg/m    Weight:   Vitals:    06/11/24 1043   Weight: 95.3 kg (210 lb)      Body mass index is 27.71 kg/m .    General Appearance:  Alert, cooperative, no distress   Head:  Normocephalic, without obvious abnormality, atraumatic   Eyes:  PERRL, conjunctiva/corneas clear, EOM's intact   ENT/Throat: Lips, mucosa, and tongue normal; teeth and gums normal   Lymph/Neck: Supple, symmetrical, trachea " midline   Lungs:   Clear to auscultation bilaterally, respirations unlabored   Cardiovascular/Heart:  Regular rate and rhythm, S1, S2 normal,no murmur, rub or gallop.    Abdomen:   Soft, non-tender, bowel sounds active all four quadrants   Musculoskeletal: Extremities normal, atraumatic   Skin: Incision covered    Neurologic: Alert and oriented X 3, Moves all 4 extremities     Psych: Affect is appropriate      Imaging: Reviewed I have personally reviewed pertinent notes, labs, tests, and radiologic imaging in patient's chart.  Labs: Reviewed I have personally reviewed pertinent notes, labs, tests, and radiologic imaging in patient's chart.  Notes: Reviewed I have personally reviewed pertinent notes, labs, tests, and radiologic imaging in patient's chart.    Total time spent 65 minutes with greater than 50% in consultation, education and coordination of care.   Also discussed with RN and Orthopedics.   Treatment plan includes: multimodal pain approach, Hospital Medicine Service for medical management, Orthopedics, PT, OT.   Patient educated regarding: multimodal pain approach and medications as listed above.   Elements of Medical Decision Making as described above. Acute or chronic illness or injury or surgery. High risk therapy including opioids, high risk drug therapy including oral and/or parenteral controlled substances.    Patient is understanding of the plan. All questions and concerns addressed to patient's satisfaction.     Thank you for this consultation.    ASHLEY Bro-DOT  Acute Care Pain Management Program   [facility]   Monday-Friday 8a-4p   Page via Epic or Dreamise

## 2024-06-12 NOTE — PROGRESS NOTES
"Orthopedic Progress Note      Assessment: 1 Day Post-Op  S/P Procedure(s):  LUMBAR 5 - SACRAL 1 ANTERIOR LUMBAR INTERBODY FUSION, POSTERIOR INSTRUMENTED FUSION WITH O ARM AND STEALTH NAVIGATION  SURGICAL EXPOSURE, ANTERIOR APPROACH, WITH WOUND CLOSURE, FOR LUMBAR SPINE SURGERY, BY GENERAL SURGERY     Plan:   -Continue PT/OT. PT recommending home with assist.  -Weightbearing status: WBAT.  -Avoid bending, twisting, or lifting greater than 10 pounds for 6 weeks.  -DVT prophylaxis with SCDs, belkys stockings and early ambulation.  -Anticoagulation: None due to Spine surgery.   -NO aspirin, anticoagulation or NSAIDS (advil/ibuprofen, aleve/naproxen, celebrex) for 5 days after surgery.  -Encourage IS.  -Appreciate hospitalist recs.  - no brace  -no drain  -Discharge planning: Discharge likely tomorrow, 6/13/24, pending BM and pass gas    Subjective:  Pain: reports pain 5/10, currently taking  Tylenol, oxy and lori  Nausea, Vomiting:  no  Lightheadedness, Dizziness:  no  Neuro:  Patient denies new onset numbness or paresthesias in bilateral lower extremities    Patient doing well on POD #1. Pain meds controlling back pain. Voiding, but no BM and not passing gas. Tolerating oral intake. Ambulating in hallways.    Objective:  /79 (BP Location: Right arm, Patient Position: Supine, Cuff Size: Adult Regular)   Pulse 61   Temp 97.9  F (36.6  C) (Oral)   Resp 18   Ht 1.854 m (6' 1\")   Wt 95.3 kg (210 lb)   SpO2 97%   BMI 27.71 kg/m    The patient is A&Ox3. Appears comfortable. Conversational. Non-labored breathing.  Resting in bed.   Sensation is intact in bilateral lower extremities and feet.  Dorsalis pedis pulses intact bilaterally.  Calves are soft and non-tender. No warmth. No edema. Negative Garima's.  Back and abdominal incisions are  covered. Dressing with scant dry blood, no surrounding erythema.    LOWER EXTREMITIES  RLE 5/5 hip flexor, 5/5 Quad, 4/5 Tib Ant, 5/5 EHL, 5/5 Gastroc Soleus  RLE L4-S1 intact to " "light touch    LLE 5/5 hip flexor, 5/5 Quad, 5/5 Tib Ant, 4/5 EHL, 5/5 Gastroc Soleus  LLE L4-S1 intact to light touch    No drain     Pertinent Labs   Lab Results: personally reviewed.   No results found for: \"INR\", \"PROTIME\"  Lab Results   Component Value Date    WBC 4.5 05/24/2024    HGB 14.6 05/24/2024    HCT 42.8 05/24/2024    MCV 90 05/24/2024     05/24/2024     Lab Results   Component Value Date     05/24/2024    CO2 26 05/24/2024         Report completed by:  Penny Self PA-C/Dr. [facility]    Date: 6/12/2024  Time: 10:51 AM   "

## 2024-06-12 NOTE — PROGRESS NOTES
[facility] Daily Progress Note    Assessment/Plan:  History of BPH  Doing well.     History of allergic rhinitis  On Singulair, fluticasone     History of asthma  No sign of exacerbation  Continue home inhalers.     History of insomnia  Ambien prn.     L5-S1 foraminal stenosis, spondylolisthesis s/p anterior L5-S1 discectomy, fusion: Postop day #1.  Continue PT OT, pain control, DVT prophylaxis per orthopedic surgery.  Encourage incentive spirometry, monitor hemoglobin    Active Problems:    Spondylolisthesis of lumbar region     LOS: 1 day     Subjective:  He has not passed flatus.  No nausea vomiting abdominal pain.  Has back pain at surgery site.  No urinary complaints.  Current Facility-Administered Medications   Medication Dose Route Frequency Provider Last Rate Last Admin    acetaminophen (TYLENOL) tablet 975 mg  975 mg Oral Q6H Boril, Pilar Aranguren, APRN CNP   975 mg at 06/12/24 1124    gabapentin (NEURONTIN) capsule 200 mg  200 mg Oral BID Boril, Pilar Aranguren, APRN CNP   200 mg at 06/12/24 1029    methocarbamol (ROBAXIN) tablet 750 mg  750 mg Oral 4x Daily Boril, Pilar Aranguren, APRN CNP   750 mg at 06/12/24 1029    montelukast (SINGULAIR) tablet 10 mg  10 mg Oral Daily Rubi Tran MD        multivitamin, therapeutic (THERA-VIT) tablet 1 tablet  1 tablet Oral Daily Ravinder Deluna PA-C   1 tablet at 06/12/24 0922    polyethylene glycol (MIRALAX) Packet 17 g  17 g Oral Daily Ravinder Deluna PA-C   17 g at 06/12/24 0920    senna-docusate (SENOKOT-S/PERICOLACE) 8.6-50 MG per tablet 1 tablet  1 tablet Oral BID Ravinder Deluna PA-C   1 tablet at 06/12/24 0923       Objective:  Vital signs in last 24 hours:  Temp:  [97.3  F (36.3  C)-98.6  F (37  C)] 97.9  F (36.6  C)  Pulse:  [49-78] 61  Resp:  [12-25] 18  BP: (110-165)/(58-88) 132/79  SpO2:  [93 %-100 %] 97 %  Weight:   [unfilled]    Intake/Output last 3 shifts:  I/O last 3 completed shifts:  In: 3280 [P.O.:480; I.V.:2800]  Out:  2815 [Urine:2775; Blood:40]  Intake/Output this shift:  No intake/output data recorded.    Review of Systems:   As per subjective, all others negative.    Physical Exam:    General Appearance:  Alert, cooperative, no distress, appears stated age   Head:  Normocephalic, without obvious abnormality, atraumatic   Eyes:  PERRL,    Lungs:   Clear to auscultation bilaterally, respirations unlabored   Chest Wall:  No tenderness or deformity   Heart:  Regular rate and rhythm, S1, S2 normal,no murmur, rub or gallop   Abdomen:   Soft, non tender, non distended, bowel sounds present, no guarding or rigidity lower abdominal dressing clean   Extremities: No edema   Skin: Skin color, texture, turgor normal, no rashes or lesions   Neurologic: Alert and oriented X 3, Moves all 4 extremities       Lab Results:  Personally Reviewed.   No results found for this or any previous visit (from the past 24 hour(s)).      Rubi Tran M.D  [facility]  Internal Medicine

## 2024-06-12 NOTE — PROGRESS NOTES
06/12/24 0730   Appointment Info   Signing Clinician's Name / Credentials (OT) Lyssa Zheng OTR/L   Living Environment   People in Home alone   Current Living Arrangements house   Home Accessibility stairs to enter home;stairs within home   Transportation Anticipated family or friend will provide   Living Environment Comments multi-level house; RTS; WIS; sister to stay with patient until recovered   Self-Care   Usual Activity Tolerance excellent   Current Activity Tolerance good   Regular Exercise Yes   Equipment Currently Used at Home raised toilet seat;grab bar, toilet;grab bar, tub/shower  (owns SPC and used an adjustable bed)   Fall history within last six months no   Activity/Exercise/Self-Care Comment Ind I/ADLs at baseline   Instrumental Activities of Daily Living (IADL)   Previous Responsibilities meal prep;housekeeping;laundry;shopping;yardwork;medication management;finances;driving   IADL Comments Pt has neighbors and his sister to assist with IADLs until recovered.   General Information   Onset of Illness/Injury or Date of Surgery 06/11/24   Referring Physician Dr. Wilfrido Desir   Patient/Family Therapy Goal Statement (OT) go home   Additional Occupational Profile Info/Pertinent History of Current Problem s/p lumbar sacral fusion   Existing Precautions/Restrictions spinal   Left Upper Extremity (Weight-bearing Status) partial weight-bearing (PWB)   Right Upper Extremity (Weight-bearing Status) partial weight-bearing (PWB)   Cognitive Status Examination   Orientation Status orientation to person, place and time   Visual Perception   Visual Impairment/Limitations WFL   Sensory   Sensory Quick Adds sensation intact   Pain Assessment   Patient Currently in Pain Yes, see Vital Sign flowsheet   Posture   Posture not impaired   Range of Motion Comprehensive   General Range of Motion no range of motion deficits identified   Strength Comprehensive (MMT)   General Manual Muscle Testing (MMT) Assessment  no strength deficits identified   Muscle Tone Assessment   Muscle Tone Quick Adds No deficits were identified   Coordination   Upper Extremity Coordination No deficits were identified   Bed Mobility   Bed Mobility supine-sit   Supine-Sit Dodge (Bed Mobility) supervision;verbal cues   Transfers   Transfers toilet transfer   Toilet Transfer   Type (Toilet Transfer) sit-stand;stand-sit   Dodge Level (Toilet Transfer) modified independence   Assistive Device (Toilet Transfer) grab bars/safety frame;raised toilet seat   Balance   Balance Assessment standing dynamic balance   Standing Balance: Dynamic contact guard   Position/Device Used, Standing Balance cane, straight   Activities of Daily Living   BADL Assessment/Intervention lower body dressing;toileting;grooming   Lower Body Dressing Assessment/Training   Position (Lower Body Dressing) supported sitting;unsupported standing   Dodge Level (Lower Body Dressing) pants/bottoms;shoes/slippers;socks;supervision;verbal cues   Grooming Assessment/Training   Position (Grooming) unsupported standing   Dodge Level (Grooming) supervision   Toileting   Dodge Level (Toileting) supervision;verbal cues   Clinical Impression   Criteria for Skilled Therapeutic Interventions Met (OT) Yes, treatment indicated   OT Diagnosis decreased fxl Ind   Influenced by the following impairments s/p lumbar sacral fusion   OT Problem List-Impairments impacting ADL activity tolerance impaired;balance;post-surgical precautions   Assessment of Occupational Performance 3-5 Performance Deficits   Identified Performance Deficits bathing, fxl txrs, IADLs, fxl mob   Planned Therapy Interventions (OT) ADL retraining;bed mobility training;transfer training   Clinical Decision Making Complexity (OT) detailed assessment/moderate complexity   Risk & Benefits of therapy have been explained evaluation/treatment results reviewed;current/potential barriers reviewed;participants  voiced agreement with care plan;participants included;patient   OT Total Evaluation Time   OT Eval, Moderate Complexity Minutes (82721) 10   OT Goals   Therapy Frequency (OT) One time eval and treatment   OT Predicted Duration/Target Date for Goal Attainment 06/12/24   OT Goals Lower Body Dressing;Bed Mobility;Toilet Transfer/Toileting   OT: Lower Body Dressing Modified independent;within precautions;Goal Met   OT: Bed Mobility Modified independent;within precautions;Goal Met   OT: Toilet Transfer/Toileting Modified independent;within precautions;Goal Met   Self-Care/Home Management   Self-Care/Home Mgmt/ADL, Compensatory, Meal Prep Minutes (33987) 23   Symptoms Noted During/After Treatment (Meal Preparation/Planning Training) increased pain   Treatment Detail/Skilled Intervention Pt greeted supine in bed. Pt educated regarding spinal precautions and self care techniques to maintain precautions. Pt completed bed mob from a flat surface with S and good carryover of logroll technique; sit < > stand txrs with S and good safety awareness; fxl mob in young ~350' with SPC and CGA and around room with S and no LOB; LB dressing (shorts, socks, and shoes with extended shoe horn) with SBA - MOD I and good carryover of techniques; toileting from a raised surface with SBA - MOD I with good carryover of techniques; and grooming while standing at sink with S-MOD I. Pt verbalized understanding of car txr demo and technique and had no further questions or concerns regarding d/c home. Pt left seated in recliner with all needs in reach.   OT Discharge Planning   OT Plan d/c OT   OT Discharge Recommendation (DC Rec) home with assist   OT Rationale for DC Rec Pt displays good fxl ability and has safe home set up with sister to support as needed during recovery.   OT Brief overview of current status MOD I ADLs and fxl txrs   Total Session Time   Timed Code Treatment Minutes 23   Total Session Time (sum of timed and untimed services) 33

## 2024-06-12 NOTE — PLAN OF CARE
Goal Outcome Evaluation:  Patient's abdominal and back dressings clean,dry, intact with small amounts of shadow drainage present.  Patient has baseline numbness and tingling in feet.         Patient vital signs are at baseline: Yes  Patient able to ambulate as they were prior to admission or with assist devices provided by therapies during their stay:  Yes  Patient MUST void prior to discharge:  Yes  Patient able to tolerate oral intake:  Yes, tolerating full liquid diet.  No flatus yet, bowel sounds active.  Patient educated on waiting on passing flatus prior to advancing diet to regular.   Pain has adequate pain control using Oral analgesics:  Yes, patient rating back pain 6-7, improved post 10mg oxycodone and scheduled medications down to 5.  Patient able to rest between cares. Ice packs offered.  Does patient have an identified :  Yes  Has goal D/C date and time been discussed with patient:  Yes,plan back home when able.     All medications and charting done per [employer] Nursing Student Intern supervised and reviewed.    Problem: Adult Inpatient Plan of Care  Goal: Optimal Comfort and Wellbeing  Intervention: Monitor Pain and Promote Comfort  Recent Flowsheet Documentation  Taken 6/12/2024 1124 by Jocelin Huynh RN  Pain Management Interventions: medication (see MAR)  Taken 6/12/2024 0800 by Jocelin Huynh RN  Pain Management Interventions: (refused ice) --     Problem: Spinal Surgery  Goal: Absence of Bleeding  Intervention: Monitor and Manage Bleeding  Recent Flowsheet Documentation  Taken 6/12/2024 0800 by Jocelin Huynh RN  Bleeding Management: dressing monitored

## 2024-06-12 NOTE — PLAN OF CARE
Patient vital signs are at baseline: Yes baseline bradycardic  Patient able to ambulate as they were prior to admission or with assist devices provided by therapies during their stay:  Yes  Patient MUST void prior to discharge:  Yes  Patient able to tolerate oral intake:  Yes  Pain has adequate pain control using Oral analgesics:  No,  Reason:  IV pain meds given to control breakthrough pain  Does patient have an identified :  Yes  Has goal D/C date and time been discussed with patient:  Yes

## 2024-06-13 PROCEDURE — 99232 SBSQ HOSP IP/OBS MODERATE 35: CPT | Performed by: NURSE PRACTITIONER

## 2024-06-13 PROCEDURE — G0378 HOSPITAL OBSERVATION PER HR: HCPCS

## 2024-06-13 PROCEDURE — 250N000011 HC RX IP 250 OP 636: Mod: JZ | Performed by: PHYSICIAN ASSISTANT

## 2024-06-13 PROCEDURE — 120N000013 HC R&B IMCU

## 2024-06-13 PROCEDURE — 96376 TX/PRO/DX INJ SAME DRUG ADON: CPT

## 2024-06-13 PROCEDURE — 250N000013 HC RX MED GY IP 250 OP 250 PS 637: Performed by: PHYSICIAN ASSISTANT

## 2024-06-13 PROCEDURE — 99232 SBSQ HOSP IP/OBS MODERATE 35: CPT | Performed by: INTERNAL MEDICINE

## 2024-06-13 PROCEDURE — 250N000013 HC RX MED GY IP 250 OP 250 PS 637: Performed by: INTERNAL MEDICINE

## 2024-06-13 PROCEDURE — 250N000013 HC RX MED GY IP 250 OP 250 PS 637: Performed by: NURSE PRACTITIONER

## 2024-06-13 RX ORDER — HYDROMORPHONE HCL IN WATER/PF 6 MG/30 ML
0.2 PATIENT CONTROLLED ANALGESIA SYRINGE INTRAVENOUS EVERY 6 HOURS PRN
Status: DISCONTINUED | OUTPATIENT
Start: 2024-06-13 | End: 2024-06-14 | Stop reason: HOSPADM

## 2024-06-13 RX ORDER — HYDROMORPHONE HYDROCHLORIDE 2 MG/1
2-4 TABLET ORAL EVERY 4 HOURS PRN
Status: DISCONTINUED | OUTPATIENT
Start: 2024-06-13 | End: 2024-06-14 | Stop reason: HOSPADM

## 2024-06-13 RX ORDER — HYDROMORPHONE HCL IN WATER/PF 6 MG/30 ML
0.4 PATIENT CONTROLLED ANALGESIA SYRINGE INTRAVENOUS EVERY 6 HOURS PRN
Status: DISCONTINUED | OUTPATIENT
Start: 2024-06-13 | End: 2024-06-14 | Stop reason: HOSPADM

## 2024-06-13 RX ORDER — AMOXICILLIN 250 MG
2 CAPSULE ORAL 2 TIMES DAILY
Status: DISCONTINUED | OUTPATIENT
Start: 2024-06-13 | End: 2024-06-14 | Stop reason: HOSPADM

## 2024-06-13 RX ORDER — GABAPENTIN 100 MG/1
200 CAPSULE ORAL 2 TIMES DAILY
Status: DISCONTINUED | OUTPATIENT
Start: 2024-06-13 | End: 2024-06-14 | Stop reason: HOSPADM

## 2024-06-13 RX ORDER — METHOCARBAMOL 750 MG/1
750 TABLET, FILM COATED ORAL EVERY 6 HOURS
Status: DISCONTINUED | OUTPATIENT
Start: 2024-06-13 | End: 2024-06-14 | Stop reason: HOSPADM

## 2024-06-13 RX ADMIN — GABAPENTIN 200 MG: 100 CAPSULE ORAL at 20:12

## 2024-06-13 RX ADMIN — HYDROMORPHONE HYDROCHLORIDE 4 MG: 2 TABLET ORAL at 17:41

## 2024-06-13 RX ADMIN — METHOCARBAMOL 750 MG: 750 TABLET, FILM COATED ORAL at 15:18

## 2024-06-13 RX ADMIN — HYDROMORPHONE HYDROCHLORIDE 0.4 MG: 0.2 INJECTION, SOLUTION INTRAMUSCULAR; INTRAVENOUS; SUBCUTANEOUS at 04:04

## 2024-06-13 RX ADMIN — ZOLPIDEM TARTRATE 5 MG: 5 TABLET ORAL at 20:12

## 2024-06-13 RX ADMIN — ACETAMINOPHEN 975 MG: 325 TABLET ORAL at 12:28

## 2024-06-13 RX ADMIN — GABAPENTIN 200 MG: 100 CAPSULE ORAL at 08:58

## 2024-06-13 RX ADMIN — OXYCODONE HYDROCHLORIDE 10 MG: 5 TABLET ORAL at 04:58

## 2024-06-13 RX ADMIN — DEXTROAMPHETAMINE SULFATE, DEXTROAMPHETAMINE SACCHARATE, AMPHETAMINE SULFATE AND AMPHETAMINE ASPARTATE 15 MG: 2.5; 2.5; 2.5; 2.5 CAPSULE, EXTENDED RELEASE ORAL at 15:18

## 2024-06-13 RX ADMIN — POLYETHYLENE GLYCOL 3350 17 G: 17 POWDER, FOR SOLUTION ORAL at 08:59

## 2024-06-13 RX ADMIN — SENNOSIDES AND DOCUSATE SODIUM 2 TABLET: 50; 8.6 TABLET ORAL at 20:12

## 2024-06-13 RX ADMIN — METHOCARBAMOL 750 MG: 750 TABLET, FILM COATED ORAL at 08:59

## 2024-06-13 RX ADMIN — HYDROMORPHONE HYDROCHLORIDE 2 MG: 2 TABLET ORAL at 13:45

## 2024-06-13 RX ADMIN — METHOCARBAMOL 750 MG: 750 TABLET, FILM COATED ORAL at 20:12

## 2024-06-13 RX ADMIN — SENNOSIDES AND DOCUSATE SODIUM 1 TABLET: 50; 8.6 TABLET ORAL at 08:59

## 2024-06-13 RX ADMIN — ACETAMINOPHEN 975 MG: 325 TABLET ORAL at 04:00

## 2024-06-13 RX ADMIN — THERA TABS 1 TABLET: TAB at 08:59

## 2024-06-13 RX ADMIN — ACETAMINOPHEN 975 MG: 325 TABLET ORAL at 17:41

## 2024-06-13 RX ADMIN — OXYCODONE HYDROCHLORIDE 10 MG: 5 TABLET ORAL at 00:06

## 2024-06-13 RX ADMIN — OXYCODONE HYDROCHLORIDE 10 MG: 5 TABLET ORAL at 09:50

## 2024-06-13 RX ADMIN — MONTELUKAST SODIUM 10 MG: 10 TABLET, FILM COATED ORAL at 08:59

## 2024-06-13 ASSESSMENT — ACTIVITIES OF DAILY LIVING (ADL)
ADLS_ACUITY_SCORE: 25

## 2024-06-13 NOTE — PLAN OF CARE
Patient vital signs are at baseline: Yes  Patient able to ambulate as they were prior to admission or with assist devices provided by therapies during their stay:  Yes  Patient MUST void prior to discharge:  Yes  Patient able to tolerate oral intake:  Yes  Pain has adequate pain control using Oral analgesics:  Yes  Does patient have an identified :  Yes  Has goal D/C date and time been discussed with patient:  Yes    Patient has been passing gas. Tolerating a regular diet, denies nausea and discomfort. Voiding adequately using the urinal at the bedside. Patient has been ambulating independently in the room per physical therapy recommendations. Rating pain 4-6/10 and managed with pain medications, ice, rest, and repositioning.

## 2024-06-13 NOTE — PROVIDER NOTIFICATION
Pt would like to have his Ambien, but in Dr. Tran's note it states to hold medication.Sent page to Dr. SIMONE Martinez. Dr. Martinez stated OK to give.

## 2024-06-13 NOTE — PLAN OF CARE
Problem: Adult Inpatient Plan of Care  Goal: Optimal Comfort and Wellbeing  Intervention: Monitor Pain and Promote Comfort  Recent Flowsheet Documentation  Taken 6/13/2024 1345 by Jocelin Huynh RN  Pain Management Interventions: medication (see MAR)  Taken 6/13/2024 1228 by Jocelin Huynh RN  Pain Management Interventions: medication (see MAR)  Taken 6/13/2024 0950 by Jocelin Huynh RN  Pain Management Interventions: medication (see MAR)     Problem: Spinal Surgery  Goal: Effective Bowel Elimination  Outcome: Progressing     Goal Outcome Evaluation:  Patient vital signs are at baseline: Yes  Patient able to ambulate as they were prior to admission or with assist devices provided by therapies during their stay:  Yes  Patient MUST void prior to discharge:  Yes  Patient able to tolerate oral intake:  Yes, passed flatus, tolerated regular lunch.  Denies nausea.   Pain has adequate pain control using Oral analgesics:  Yes, reports pain 4 post po dilaudid.    Does patient have an identified :  Yes  Has goal D/C date and time been discussed with patient:  Yes  Dressings remain clean, dry, intact.      I attest to Adriana Gilmore Student Nurse Intern.  Jocelin Huynh RN on 6/13/2024 at 4:02 PM

## 2024-06-13 NOTE — PROGRESS NOTES
[facility] ACUTE PAIN SERVICE    [facility], [facility], [facility], [facility], [place]   PAIN Progress Note    Assessment/Plan:  Segundo Sellers is a 64 year old male who was admitted on 6/11/2024.  Pain team was asked to see the patient for post op pain.          Admitted for planned procedure. History of BPH, allergic rhinitis, asthma, insomnia.  The patient does not smoke and denies chemical dependency history.      Post op day: 2 Day Post-Op. L5-S1 foraminal stenosis, spondylolisthesis s/p anterior L5-S1 discectomy, fusion      PLAN:   1) Pain is consistent with post op pain in the setting of chronic pain on chronic opioid therapy   Multimodal Medication Therapy  Topical: consider   NSAID'S: CrCl 96.7. None, post fusion   Steroids: none   Muscle Relaxants: robaxin qid alternating with APAP  Adjuvants: APAP q6h alternating with robaxin, added Gabapentin 200 mg bid 6/12/24  Antidepressants/anxiolytics: Ambien prn         Opioids: Oxycodone 5-10 mg q4h prn (home med was Oxycodone 5 mg q6h prn). Will stop Oxycodone today. Trial dilaudid 2-4 mg q4h prn. If dilaudid not effective can change back to Oxycodone.    IV Pain medication: dilaudid 0.2-0.4 mg q2h prn - change to q6h prn and taper off    Non-medication interventions: Ice, Rest, PT, OT, and Distraction (TV, Music, Reading)  Constipation Prophylaxis: supp, MOM, Senna-docusate and Miralax     -Opioid prescriber has been Primary Care Provider, Segundo Dejesus   -[place]  pulled from system on 6/12/24. This indicates monthly prescriptions for Oxycodone, Adderall, Ambien   6/6/24 Oxycodone 5 mg #120 for 30 days   6/6/24 Adderall 15 mg #30 for 30 days   6/6/24 Ambien 5 mg #30 for 30 days  Discharge Recommendations - We recommend prescribing the following at the time of discharge: per Orthopedics - dilaudid vs Oxycodone, APAP, robaxin, Gabapentin     Subjective:  Describes pain as 6-7/10 and aching, burning in the low back and down both extremities. The patient  "denies nausea, vomiting, diarrhea, chest pain, shortness of breath, dizziness, fever, and chills. The patient reports no BM yet and not passing flatus.      Principal Problem:  <principal problem not specified>     Patient Active Problem List   Diagnosis    Allergic Rhinitis    Eczema    Dyslipidemia    Asthma    Benign Prostatic Hypertrophy    Lethargy    Impaired Fasting Glucose    Current moderate episode of major depressive disorder without prior episode (H)    Slowing of urinary stream    Pelvic and perineal pain    Dyslipidemia    Abdominal pain    Lower urinary tract symptoms    Spondylolisthesis of lumbar region        Objective:    History   Drug Use No          Tobacco Use      Smoking status: Never        Passive exposure: Never      Smokeless tobacco: Former        Types: Chew      Vital signs in last 24 hours:  /66 (BP Location: Right arm)   Pulse 60   Temp 98.7  F (37.1  C) (Oral)   Resp 18   Ht 1.854 m (6' 1\")   Wt 95.3 kg (210 lb)   SpO2 98%   BMI 27.71 kg/m      Weight:     Vitals:    06/11/24 1043   Weight: 95.3 kg (210 lb)      Weight change:   Body mass index is 27.71 kg/m .    Intake/Output last 3 shifts:  I/O last 3 completed shifts:  In: 920 [P.O.:920]  Out: 2100 [Urine:2100]  Intake/Output this shift:  No intake/output data recorded.    Review of Systems:   As per subjective, all others negative.    Physical Exam:  General Appearance:  Alert, cooperative, no distress, resting in bed, has been up ambulating   Head:  Normocephalic, without obvious abnormality, atraumatic   Eyes:  PERRL, conjunctiva/corneas clear, EOM's intact   Nose: Nares normal, septum midline   Throat: Lips, mucosa, and tongue normal; teeth and gums normal   Neck: Supple, symmetrical, trachea midline   Back:   Incision is covered, dressing is CDI    Lungs:   Clear to auscultation bilaterally, respirations unlabored   Heart:  Regular rate and rhythm, S1, S2 normal    Abdomen:   Soft, non-tender, bowel sounds " active all four quadrants   Extremities: Extremities normal   Skin: Skin warm, dry    Neurologic: Alert and oriented X 3, Moves all 4 extremities   Psych: Affect is appropriate      Imaging: Reviewed I have personally reviewed pertinent notes, labs, tests, and radiologic imaging in patient's chart.  Labs: Reviewed I have personally reviewed pertinent notes, labs, tests, and radiologic imaging in patient's chart.  Notes: Reviewed I have personally reviewed pertinent notes, labs, tests, and radiologic imaging in patient's chart.    Total time spent 35 minutes with greater than 50% in consultation, education and coordination of care.   Also discussed with RN.   Treatment plan includes: multimodal pain approach, Hospital Medicine Service for medical management, Orthopedics, PT, OT.   Patient educated regarding: multimodal pain approach and medications as listed above.   Elements of Medical Decision Making as described above. Acute or chronic illness or injury or surgery. High risk therapy including opioids, high risk drug therapy including oral and/or parenteral controlled substances.    Patient is understanding of the plan. All questions and concerns addressed to patient's satisfaction.     GIRMA BroP-C  Acute Care Pain Management Program  [facility]   Monday-Friday 8a-4p   Page via Epic or frestyl

## 2024-06-13 NOTE — PROGRESS NOTES
"Orthopedic Progress Note      Assessment: 2 Days Post-Op  S/P Procedure(s):  LUMBAR 5 - SACRAL 1 ANTERIOR LUMBAR INTERBODY FUSION, POSTERIOR INSTRUMENTED FUSION WITH O ARM AND STEALTH NAVIGATION  SURGICAL EXPOSURE, ANTERIOR APPROACH, WITH WOUND CLOSURE, FOR LUMBAR SPINE SURGERY, BY GENERAL SURGERY     Plan:   -Continue PT/OT. PT recommending home with assist.  -Weightbearing status: WBAT.  -Avoid bending, twisting, or lifting greater than 10 pounds for 6 weeks.  -DVT prophylaxis with SCDs, belkys stockings and early ambulation.  -Anticoagulation: None due to Spine surgery.   -NO aspirin, anticoagulation or NSAIDS (advil/ibuprofen, aleve/naproxen, celebrex) for 5 days after surgery.  -Encourage IS.  -Appreciate hospitalist recs.  - no brace  -no drain  -Discharge planning: Discharge pending BM and pass gas      Subjective:  Pain: reports increased pain today, mainly over abdominal incision, currently taking  Tylenol, oxy and lori  Nausea, Vomiting:  no  Lightheadedness, Dizziness:  no  Neuro:  Patient denies new onset numbness or paresthesias in bilateral lower extremities     Patient reports pain is worse today than yesterday. Voiding, but no BM and not passing gas. Patient reports he feels his stomach \"rumbling\". Tolerating oral intake. Ambulating in hallways.     Objective:  /66 (BP Location: Right arm)   Pulse 60   Temp 98.7  F (37.1  C) (Oral)   Resp 18   Ht 1.854 m (6' 1\")   Wt 95.3 kg (210 lb)   SpO2 98%   BMI 27.71 kg/m      The patient is A&Ox3. Appears comfortable. Conversational. Non-labored breathing.  Resting in bed.   Sensation is intact in bilateral lower extremities and feet.  Dorsalis pedis pulses intact bilaterally.  Calves are soft and non-tender. No warmth. No edema. Negative Garima's.  Back and abdominal incisions are  covered. Dressings changed. Incisions intact. No surrounding erythema,, ecchymosis or drainage.    Abdomen is soft and non-tender    LOWER EXTREMITIES  RLE 5/5 hip flexor, " "5/5 Quad, 4/5 Tib Ant, 5/5 EHL, 5/5 Gastroc Soleus  RLE L4-S1 intact to light touch     LLE 5/5 hip flexor, 5/5 Quad, 5/5 Tib Ant, 4/5 EHL, 5/5 Gastroc Soleus  LLE L4-S1 intact to light touch     No drain   Pertinent Labs   Lab Results: personally reviewed.   No results found for: \"INR\", \"PROTIME\"  Lab Results   Component Value Date    WBC 4.5 05/24/2024    HGB 14.6 05/24/2024    HCT 42.8 05/24/2024    MCV 90 05/24/2024     05/24/2024     Lab Results   Component Value Date     05/24/2024    CO2 26 05/24/2024         Report completed by:  Penny Self PA-C/Dr. [facility]    Date: 6/13/2024  Time: 10:29 AM   "

## 2024-06-13 NOTE — PLAN OF CARE
Problem: Spinal Surgery  Goal: Optimal Coping with Surgery  Outcome: Progressing   Goal Outcome Evaluation:    Patient vital signs are at baseline: Yes  Patient able to ambulate as they were prior to admission or with assist devices provided by therapies during their stay:  Yes, cleared to be independent status  Patient MUST void prior to discharge:  Yes  Patient able to tolerate oral intake: Pt has  not passed gas. Full liquid diet.   Pain has adequate pain control using Oral analgesics:  No, Pt stated that he was 10/10 and required PRN IV dilaudid.   Does patient have an identified :  Yes  Has goal D/C date and time been discussed with patient:  Yes    Pt still continues not to be able to pass gas. Will continue to monitor.      Pt requested Ensure supplements until he is able to eat solid food.     Pt did not want the protocol POD 2 BS.

## 2024-06-13 NOTE — PROGRESS NOTES
[facility] Daily Progress Note    Assessment/Plan:  History of BPH  Doing well.     History of allergic rhinitis  On Singulair, fluticasone     History of asthma  No sign of exacerbation  Continue home inhalers.     History of insomnia  Ambien prn.     L5-S1 foraminal stenosis, spondylolisthesis s/p anterior L5-S1 discectomy, fusion: Postop day #2.  Continue PT OT, pain control, DVT prophylaxis per orthopedic surgery.  Encourage incentive spirometry, monitor hemoglobin  Advancing diet slowly as he has not passed gas.  Abdomen is benign.    Active Problems:    Spondylolisthesis of lumbar region     LOS: 1 day     Subjective:  More pain today.  He has not passed flatus.  No nausea vomiting abdominal pain.  No urinary complaints.  Current Facility-Administered Medications   Medication Dose Route Frequency Provider Last Rate Last Admin    acetaminophen (TYLENOL) tablet 975 mg  975 mg Oral Q6H Carolynn Morales APRN CNP   975 mg at 06/13/24 1228    amphetamine-dextroamphetamine (ADDERALL XR) ER cap 15 mg  15 mg Oral Daily Rubi Tran MD        gabapentin (NEURONTIN) capsule 200 mg  200 mg Oral BID Carolynn Morales APRN CNP        methocarbamol (ROBAXIN) tablet 750 mg  750 mg Oral Q6H Carolynn Morales APRN CNP        montelukast (SINGULAIR) tablet 10 mg  10 mg Oral Daily Rubi Tran MD   10 mg at 06/13/24 0859    multivitamin, therapeutic (THERA-VIT) tablet 1 tablet  1 tablet Oral Daily Ravinder Deluna PA-C   1 tablet at 06/13/24 0859    polyethylene glycol (MIRALAX) Packet 17 g  17 g Oral Daily Ravinder Deluna PA-C   17 g at 06/13/24 0859    senna-docusate (SENOKOT-S/PERICOLACE) 8.6-50 MG per tablet 2 tablet  2 tablet Oral BID Carolynn Morales APRN CNP           Objective:  Vital signs in last 24 hours:  Temp:  [98  F (36.7  C)-98.7  F (37.1  C)] 98.7  F (37.1  C)  Pulse:  [56-60] 60  Resp:  [18] 18  BP: (125-136)/(66-75) 125/66  SpO2:  [98 %] 98 %  Weight:    [unfilled]    Intake/Output last 3 shifts:  I/O last 3 completed shifts:  In: 920 [P.O.:920]  Out: 2100 [Urine:2100]  Intake/Output this shift:  I/O this shift:  In: 640 [P.O.:640]  Out: 300 [Urine:300]    Review of Systems:   As per subjective, all others negative.    Physical Exam:    General Appearance:  Alert, cooperative, no distress, appears stated age   Head:  Normocephalic, without obvious abnormality, atraumatic   Eyes:  PERRL,    Lungs:   Clear to auscultation bilaterally, respirations unlabored   Chest Wall:  No tenderness or deformity   Heart:  Regular rate and rhythm, S1, S2 normal,no murmur, rub or gallop   Abdomen:   Soft, non tender, non distended, bowel sounds present, no guarding or rigidity lower abdominal dressing clean   Extremities: No edema   Skin: Skin color, texture, turgor normal, no rashes or lesions   Neurologic: Alert and oriented X 3, Moves all 4 extremities       Lab Results:  Personally Reviewed.   No results found for this or any previous visit (from the past 24 hour(s)).      Rubi Tran M.D  [facility] Service  Internal Medicine

## 2024-06-14 VITALS
RESPIRATION RATE: 16 BRPM | OXYGEN SATURATION: 95 % | SYSTOLIC BLOOD PRESSURE: 125 MMHG | HEART RATE: 58 BPM | BODY MASS INDEX: 27.83 KG/M2 | DIASTOLIC BLOOD PRESSURE: 76 MMHG | WEIGHT: 210 LBS | TEMPERATURE: 98.5 F | HEIGHT: 73 IN

## 2024-06-14 PROCEDURE — 99232 SBSQ HOSP IP/OBS MODERATE 35: CPT | Performed by: INTERNAL MEDICINE

## 2024-06-14 PROCEDURE — 250N000013 HC RX MED GY IP 250 OP 250 PS 637: Performed by: INTERNAL MEDICINE

## 2024-06-14 PROCEDURE — 250N000013 HC RX MED GY IP 250 OP 250 PS 637: Performed by: PHYSICIAN ASSISTANT

## 2024-06-14 PROCEDURE — 250N000013 HC RX MED GY IP 250 OP 250 PS 637: Performed by: NURSE PRACTITIONER

## 2024-06-14 PROCEDURE — G0378 HOSPITAL OBSERVATION PER HR: HCPCS

## 2024-06-14 RX ORDER — AMOXICILLIN 250 MG
2 CAPSULE ORAL 2 TIMES DAILY
Qty: 60 TABLET | Refills: 0 | Status: SHIPPED | OUTPATIENT
Start: 2024-06-14

## 2024-06-14 RX ORDER — METHOCARBAMOL 750 MG/1
750 TABLET, FILM COATED ORAL EVERY 6 HOURS
Qty: 30 TABLET | Refills: 0 | Status: SHIPPED | OUTPATIENT
Start: 2024-06-14

## 2024-06-14 RX ORDER — ACETAMINOPHEN 325 MG/1
650 TABLET ORAL EVERY 4 HOURS PRN
Qty: 100 TABLET | Refills: 0 | Status: SHIPPED | OUTPATIENT
Start: 2024-06-14

## 2024-06-14 RX ORDER — GABAPENTIN 100 MG/1
200 CAPSULE ORAL 2 TIMES DAILY
Qty: 30 CAPSULE | Refills: 0 | Status: SHIPPED | OUTPATIENT
Start: 2024-06-14

## 2024-06-14 RX ORDER — DOCUSATE SODIUM 100 MG/1
100 CAPSULE, LIQUID FILLED ORAL DAILY
Status: SHIPPED
Start: 2024-06-14

## 2024-06-14 RX ORDER — HYDROMORPHONE HYDROCHLORIDE 2 MG/1
2-4 TABLET ORAL EVERY 4 HOURS PRN
Qty: 26 TABLET | Refills: 0 | Status: SHIPPED | OUTPATIENT
Start: 2024-06-14 | End: 2024-09-11

## 2024-06-14 RX ADMIN — METHOCARBAMOL 750 MG: 750 TABLET, FILM COATED ORAL at 04:07

## 2024-06-14 RX ADMIN — MONTELUKAST SODIUM 10 MG: 10 TABLET, FILM COATED ORAL at 08:24

## 2024-06-14 RX ADMIN — ACETAMINOPHEN 975 MG: 325 TABLET ORAL at 00:21

## 2024-06-14 RX ADMIN — THERA TABS 1 TABLET: TAB at 08:24

## 2024-06-14 RX ADMIN — SENNOSIDES AND DOCUSATE SODIUM 2 TABLET: 50; 8.6 TABLET ORAL at 08:24

## 2024-06-14 RX ADMIN — HYDROMORPHONE HYDROCHLORIDE 4 MG: 2 TABLET ORAL at 00:21

## 2024-06-14 RX ADMIN — HYDROMORPHONE HYDROCHLORIDE 4 MG: 2 TABLET ORAL at 08:23

## 2024-06-14 RX ADMIN — HYDROMORPHONE HYDROCHLORIDE 4 MG: 2 TABLET ORAL at 04:07

## 2024-06-14 RX ADMIN — DEXTROAMPHETAMINE SULFATE, DEXTROAMPHETAMINE SACCHARATE, AMPHETAMINE SULFATE AND AMPHETAMINE ASPARTATE 15 MG: 2.5; 2.5; 2.5; 2.5 CAPSULE, EXTENDED RELEASE ORAL at 08:24

## 2024-06-14 RX ADMIN — POLYETHYLENE GLYCOL 3350 17 G: 17 POWDER, FOR SOLUTION ORAL at 08:24

## 2024-06-14 RX ADMIN — GABAPENTIN 200 MG: 100 CAPSULE ORAL at 08:23

## 2024-06-14 RX ADMIN — ACETAMINOPHEN 975 MG: 325 TABLET ORAL at 06:56

## 2024-06-14 RX ADMIN — METHOCARBAMOL 750 MG: 750 TABLET, FILM COATED ORAL at 09:40

## 2024-06-14 ASSESSMENT — ACTIVITIES OF DAILY LIVING (ADL)
ADLS_ACUITY_SCORE: 25
ADLS_ACUITY_SCORE: 23
ADLS_ACUITY_SCORE: 23
ADLS_ACUITY_SCORE: 25
ADLS_ACUITY_SCORE: 23
ADLS_ACUITY_SCORE: 25
ADLS_ACUITY_SCORE: 25

## 2024-06-14 NOTE — PLAN OF CARE
"  Problem: Adult Inpatient Plan of Care  Goal: Readiness for Transition of Care  Outcome: Progressing   Goal Outcome Evaluation:                      /63 (BP Location: Left arm)   Pulse 71   Temp 98.5  F (36.9  C) (Oral)   Resp 17   Ht 1.854 m (6' 1\")   Wt 95.3 kg (210 lb)   SpO2 96%   BMI 27.71 kg/m        Patient vital signs are at baseline: Yes  Patient able to ambulate as they were prior to admission or with assist devices provided by therapies during their stay:  Yes  Patient MUST void prior to discharge:  Yes  Patient able to tolerate oral intake:  Yes  Pain has adequate pain control using Oral analgesics:  Yes  Does patient have an identified :  Yes  Has goal D/C date and time been discussed with patient:  Yes    "

## 2024-06-14 NOTE — PROGRESS NOTES
Will not see today:  PAIN MANAGEMENT SERVICE CHART CHECK    This patient's chart has been reviewed by the Pain Service. It has been determined that no change is necessary to the pain management regimen at this time. Plans for discharge. Pain team will not see. If you would like the patient to be seen, please contact the service and ask to have the patient seen.    Thank you!      GIRMA BroP-C  Acute Care Pain Management Program [facility]   Monday-Friday 8a-4p   Page via Epic or [employer]

## 2024-06-14 NOTE — PLAN OF CARE
Patient vital signs are at baseline: Yes  Patient able to ambulate as they were prior to admission or with assist devices provided by therapies during their stay:  Yes  Patient MUST void prior to discharge:  Yes  Patient able to tolerate oral intake:  Yes  Pain has adequate pain control using Oral analgesics:  Yes  Does patient have an identified :  Yes  Has goal D/C date and time been discussed with patient:  Yes    PIV removed, all care questions answered. Discharge [facility] was notified that pt would be appropriate and pt was transferred to the [facility].        Problem: Adult Inpatient Plan of Care  Goal: Absence of Hospital-Acquired Illness or Injury  Outcome: Met  Intervention: Identify and Manage Fall Risk  Recent Flowsheet Documentation  Taken 6/14/2024 0825 by Koby Renteria RN  Safety Promotion/Fall Prevention:   safety round/check completed   assistive device/personal items within reach   clutter free environment maintained   increased rounding and observation   increase visualization of patient   lighting adjusted   nonskid shoes/slippers when out of bed   patient and family education  Intervention: Prevent Skin Injury  Recent Flowsheet Documentation  Taken 6/14/2024 0825 by Koby Renteria RN  Body Position:   position changed independently   weight shifting  Skin Protection: adhesive use limited  Device Skin Pressure Protection: adhesive use limited  Intervention: Prevent and Manage VTE (Venous Thromboembolism) Risk  Recent Flowsheet Documentation  Taken 6/14/2024 0825 by Koby Renteria RN  VTE Prevention/Management: SCDs (sequential compression devices) off  Intervention: Prevent Infection  Recent Flowsheet Documentation  Taken 6/14/2024 0825 by Koby Renteria RN  Infection Prevention:   rest/sleep promoted   hand hygiene promoted   equipment surfaces disinfected

## 2024-06-14 NOTE — PROGRESS NOTES
[facility] Daily Progress Note    Assessment/Plan:  History of BPH  Doing well.     History of allergic rhinitis  On Singulair, fluticasone     History of asthma  No sign of exacerbation  Continue home inhalers.     History of insomnia  Ambien prn.     L5-S1 foraminal stenosis, spondylolisthesis s/p anterior L5-S1 discectomy, fusion: Postop day #3.  Continue PT OT, pain control, DVT prophylaxis per orthopedic surgery.  Encourage incentive spirometry, monitor hemoglobin  Tolerating diet, passing gas.  No bowel movement yet.    Active Problems:    Spondylolisthesis of lumbar region     LOS: 1 day     Subjective:  He is anxious to go home.  Feeling much better.  Passing gas, no bowel movement yet.  No abdominal pain, nausea vomiting.  Back pain is controlled.  Current Facility-Administered Medications   Medication Dose Route Frequency Provider Last Rate Last Admin    acetaminophen (TYLENOL) tablet 975 mg  975 mg Oral Q6H Carolynn Morales APRN CNP   975 mg at 06/14/24 0656    amphetamine-dextroamphetamine (ADDERALL XR) ER cap 15 mg  15 mg Oral Daily Rubi Tran MD   15 mg at 06/14/24 0824    gabapentin (NEURONTIN) capsule 200 mg  200 mg Oral BID Carolynn Morales APRN CNP   200 mg at 06/14/24 0823    methocarbamol (ROBAXIN) tablet 750 mg  750 mg Oral Q6H Carolynn Morales APRN CNP   750 mg at 06/14/24 0940    montelukast (SINGULAIR) tablet 10 mg  10 mg Oral Daily Rubi Tran MD   10 mg at 06/14/24 0824    multivitamin, therapeutic (THERA-VIT) tablet 1 tablet  1 tablet Oral Daily Ravinder Deluna PA-C   1 tablet at 06/14/24 0824    polyethylene glycol (MIRALAX) Packet 17 g  17 g Oral Daily Ravinder Deluna PA-C   17 g at 06/14/24 0824    senna-docusate (SENOKOT-S/PERICOLACE) 8.6-50 MG per tablet 2 tablet  2 tablet Oral BID BorCarolynn reyes APRN CNP   2 tablet at 06/14/24 0824       Objective:  Vital signs in last 24 hours:  Temp:  [98.5  F (36.9  C)-99.1  F (37.3  C)] 98.5  F  (36.9  C)  Pulse:  [58-71] 58  Resp:  [16-18] 16  BP: (123-125)/(63-76) 125/76  SpO2:  [95 %-96 %] 95 %  Weight:   [unfilled]    Intake/Output last 3 shifts:  I/O last 3 completed shifts:  In: 840 [P.O.:840]  Out: 1900 [Urine:1900]  Intake/Output this shift:  No intake/output data recorded.    Review of Systems:   As per subjective, all others negative.    Physical Exam:    General Appearance:  Alert, cooperative, no distress, appears stated age   Head:  Normocephalic, without obvious abnormality, atraumatic   Eyes:  PERRL,    Lungs:   Clear to auscultation bilaterally, respirations unlabored   Chest Wall:  No tenderness or deformity   Heart:  Regular rate and rhythm, S1, S2 normal,no murmur, rub or gallop   Abdomen:   Soft, non tender, non distended, bowel sounds present, no guarding or rigidity lower abdominal dressing clean   Extremities: No edema   Skin: Skin color, texture, turgor normal, no rashes or lesions   Neurologic: Alert and oriented X 3, Moves all 4 extremities       Lab Results:  Personally Reviewed.   No results found for this or any previous visit (from the past 24 hour(s)).      Rubi Tran M.D  [facility] Service  Internal Medicine

## 2024-06-14 NOTE — DISCHARGE SUMMARY
ORTHOPEDIC DISCHARGE SUMMARY       Segundo Sellers,  1960, MRN [id number]    Admission Date: 2024      Admission Diagnoses: Spinal stenosis [M48.00]  Spondylolisthesis [M43.10]     Discharge Date:  2024    Post-operative Day:  3 Days Post-Op    Reason for Admission: The patient was admitted for the following: Procedure(s):  LUMBAR 5 - SACRAL 1 ANTERIOR LUMBAR INTERBODY FUSION, POSTERIOR INSTRUMENTED FUSION WITH O ARM AND STEALTH NAVIGATION  SURGICAL EXPOSURE, ANTERIOR APPROACH, WITH WOUND CLOSURE, FOR LUMBAR SPINE SURGERY, BY GENERAL SURGERY    BRIEF HOSPITAL COURSE   Segundo Sellers is a pleasant 64 year old male who underwent the aforementioned procedure with Dr. Desir on 24. There were no intraoperative complications and the patient was transferred to the recovery room and later the orthopedic unit in stable condition. Once the patient reached the orthopedic floor our orthopedic pain protocol was implemented along with the following:    Anticoagulation Medications: None  Therapy: PT and OT  Activity: WBAT  Bracing: None    Consultations during Admission: Hospitalist service for medical management     COMPLICATIONS/SIGNIFICANT FINDINGS        DISCHARGE INFORMATION   Condition at discharge: Good  Discharge destination: Home  Patient was seen by myself on the date of discharge.    FOLLOW UP CARE   Follow up with orthopedics in 6 weeks or sooner should the need arise. Ortho will continue to manage pain control, post op anticoagulation and incision care.     Follow up with your PCP for management of chronic medical problems and to evaluate for post op medical complications including constipation, nausea/vomiting, DVT/PE, anemia, changes in blood pressure, fevers/chills, urinary retention and atelectasis/pneumonia.     Subjective   Patient is doing well on POD #1. Pain is well controlled with oral medications. Ambulating. Tolerating oral intake.     Physical Exam   /76 (BP Location:  "Left arm)   Pulse 58   Temp 98.5  F (36.9  C) (Oral)   Resp 16   Ht 1.854 m (6' 1\")   Wt 95.3 kg (210 lb)   SpO2 95%   BMI 27.71 kg/m    The patient is A&Ox3. Appears comfortable. Conversational. Non-labored breathing.  Sensation is intact in bilateral lower extremities and feet.  Dorsalis pedis pulses intact bilaterally.  Calves are soft and non-tender. No warmth. No edema. Negative Garima's.  Back and abdominal incisions are  covered.  No surrounding erythema,, ecchymosis or drainage.    Abdomen is soft and non-tender     LOWER EXTREMITIES  RLE 5/5 hip flexor, 5/5 Quad, 4/5 Tib Ant, 5/5 EHL, 5/5 Gastroc Soleus  RLE L4-S1 intact to light touch     LLE 5/5 hip flexor, 5/5 Quad, 5/5 Tib Ant, 4/5 EHL, 5/5 Gastroc Soleus  LLE L4-S1 intact to light touch    Pertinent Results at Discharge     Hemoglobin   Date/Time Value Ref Range Status   05/24/2024 08:21 AM 14.6 13.3 - 17.7 g/dL Final   07/26/2023 11:04 AM 14.5 13.3 - 17.7 g/dL Final   03/03/2023 10:36 AM 14.3 13.3 - 17.7 g/dL Final     Platelet Count   Date/Time Value Ref Range Status   05/24/2024 08:21  150 - 450 10e3/uL Final   07/26/2023 11:04  150 - 450 10e3/uL Final   03/03/2023 10:36  150 - 450 10e3/uL Final       Problem List   Active Problems:    Spondylolisthesis of lumbar region      Penny Self PA-C/Dr. Desir  [facility]  [phone]  Date: 6/14/2024  Time: 10:07 AM   "

## 2024-06-14 NOTE — PROGRESS NOTES
"Orthopedic Progress Note      Assessment: 3 Days Post-Op  S/P Procedure(s):  LUMBAR 5 - SACRAL 1 ANTERIOR LUMBAR INTERBODY FUSION, POSTERIOR INSTRUMENTED FUSION WITH O ARM AND STEALTH NAVIGATION  SURGICAL EXPOSURE, ANTERIOR APPROACH, WITH WOUND CLOSURE, FOR LUMBAR SPINE SURGERY, BY GENERAL SURGERY     Plan:   -Continue PT/OT. PT recommending home with assist.  -Weightbearing status: WBAT.  -Avoid bending, twisting, or lifting greater than 10 pounds for 6 weeks.  -DVT prophylaxis with SCDs, belkys stockings and early ambulation.  -Anticoagulation: None due to Spine surgery.   -NO aspirin, anticoagulation or NSAIDS (advil/ibuprofen, aleve/naproxen, celebrex) for 5 days after surgery.  -Encourage IS.  -Appreciate hospitalist recs.  - no brace  -no drain  -Discharge planning: Discharge to home today     Subjective:  Pain: well controlled on  Tylenol, Dilaudid and lori  Nausea, Vomiting:  no  Lightheadedness, Dizziness:  no  Neuro:  Patient denies new onset numbness or paresthesias in bilateral lower extremities     Patient reports pain is controlled. Passing gas and eating a regular diet. Tolerating oral intake. Ambulating in hallways.Ready for discharge.     Objective:  /76 (BP Location: Left arm)   Pulse 58   Temp 98.5  F (36.9  C) (Oral)   Resp 16   Ht 1.854 m (6' 1\")   Wt 95.3 kg (210 lb)   SpO2 95%   BMI 27.71 kg/m      The patient is A&Ox3. Appears comfortable. Conversational. Non-labored breathing.  Sensation is intact in bilateral lower extremities and feet.  Dorsalis pedis pulses intact bilaterally.  Calves are soft and non-tender. No warmth. No edema. Negative Garima's.  Back and abdominal incisions are  covered.  No surrounding erythema,, ecchymosis or drainage.    Abdomen is soft and non-tender     LOWER EXTREMITIES  RLE 5/5 hip flexor, 5/5 Quad, 4/5 Tib Ant, 5/5 EHL, 5/5 Gastroc Soleus  RLE L4-S1 intact to light touch     LLE 5/5 hip flexor, 5/5 Quad, 5/5 Tib Ant, 4/5 EHL, 5/5 Gastroc Soleus  LLE " "L4-S1 intact to light touch     No drain   Pertinent Labs   Lab Results: personally reviewed.   No results found for: \"INR\", \"PROTIME\"  Lab Results   Component Value Date    WBC 4.5 05/24/2024    HGB 14.6 05/24/2024    HCT 42.8 05/24/2024    MCV 90 05/24/2024     05/24/2024     Lab Results   Component Value Date     05/24/2024    CO2 26 05/24/2024         Report completed by:  Penny Self PA-C/Dr[facility]    Date: 6/14/2024  Time: 9:54 AM     .spin  "

## 2024-06-17 ENCOUNTER — PATIENT OUTREACH (OUTPATIENT)
Dept: FAMILY MEDICINE | Facility: CLINIC | Age: 64
End: 2024-06-17
Payer: COMMERCIAL

## 2024-06-17 NOTE — TELEPHONE ENCOUNTER
Transitions of Care Outreach  Chief Complaint   Patient presents with    Hospital F/U       Most Recent Admission Date: 6/11/2024   Most Recent Admission Diagnosis: Spinal stenosis - M48.00  Spondylolisthesis - M43.10     Most Recent Discharge Date: 6/14/2024   Most Recent Discharge Diagnosis: Spondylolisthesis of lumbar region - M43.16     Transitions of Care Assessment    Discharge Assessment  How are you doing now that you are home?: feeling sore  How are your symptoms? (Red Flag symptoms escalate to triage hotline per guidelines): Improved  Do you know how to contact your clinic care team if you have future questions or changes to your health status? : Yes  Does the patient have their discharge instructions? : Yes  Does the patient have questions regarding their discharge instructions? : No  Were you started on any new medications or were there changes to any of your previous medications? : No  Does the patient have all of their medications?: Yes  Do you have questions regarding any of your medications? : No  Do you have all of your needed medical supplies or equipment (DME)?  (i.e. oxygen tank, CPAP, cane, etc.): Yes    Follow up Plan     Discharge Follow-Up  Discharge follow up appointment scheduled in alignment with recommended follow up timeframe or Transitions of Risk Category? (Low = within 30 days; Moderate= within 14 days; High= within 7 days): No  Patient's follow up appointment not scheduled: Patient declined scheduling support. Education on the importance of transitions of care follow up. Provided scheduling phone number.    No future appointments.    Outpatient Plan as outlined on AVS reviewed with patient.    For any urgent concerns, please contact our 24 hour nurse triage line: [phone] ([phone])       Reno Paulino RN

## 2024-06-17 NOTE — TELEPHONE ENCOUNTER
Called patient to follow up after discharge. Patient asked for call back later.     Reno Paulino RN  [facility]

## 2024-07-08 DIAGNOSIS — G47.00 INSOMNIA, UNSPECIFIED TYPE: ICD-10-CM

## 2024-07-08 DIAGNOSIS — M48.061 SPINAL STENOSIS OF LUMBAR REGION WITHOUT NEUROGENIC CLAUDICATION: ICD-10-CM

## 2024-07-08 DIAGNOSIS — F90.0 ATTENTION-DEFICIT HYPERACTIVITY DISORDER, PREDOMINANTLY INATTENTIVE TYPE: ICD-10-CM

## 2024-07-08 DIAGNOSIS — G89.29 OTHER CHRONIC PAIN: ICD-10-CM

## 2024-07-08 RX ORDER — ZOLPIDEM TARTRATE 5 MG/1
5 TABLET ORAL
Qty: 30 TABLET | Refills: 0 | Status: SHIPPED | OUTPATIENT
Start: 2024-07-08 | End: 2024-08-04

## 2024-07-08 RX ORDER — DEXTROAMPHETAMINE SACCHARATE, AMPHETAMINE ASPARTATE MONOHYDRATE, DEXTROAMPHETAMINE SULFATE AND AMPHETAMINE SULFATE 3.75; 3.75; 3.75; 3.75 MG/1; MG/1; MG/1; MG/1
15 CAPSULE, EXTENDED RELEASE ORAL DAILY
Qty: 30 CAPSULE | Refills: 0 | Status: SHIPPED | OUTPATIENT
Start: 2024-07-08 | End: 2024-08-04

## 2024-07-09 RX ORDER — OXYCODONE HYDROCHLORIDE 5 MG/1
5 TABLET ORAL EVERY 6 HOURS PRN
Qty: 120 TABLET | Refills: 0 | Status: SHIPPED | OUTPATIENT
Start: 2024-07-09 | End: 2024-08-04

## 2024-07-09 NOTE — TELEPHONE ENCOUNTER
I sent the refill for the zolpidem and the Adderall medication.  Patient had surgery in mid June.  Pain medication was prescribed by orthopedic specialist at the time of surgery.  Please call patient and determine the current situation regarding his pain management.

## 2024-07-09 NOTE — TELEPHONE ENCOUNTER
See refill requests from the patient into the clinic on 7/8/24, Monday, regarding oxycodone, Adderall, and zolpidem.    Patient called back into the clinic and writer relayed the PCP's response to him regarding his current refill requests and PCP's concern about the patient's ongoing surgical pain.    Patient had surgery on 6/11/24 for:  Procedure(s):  LUMBAR 5 - SACRAL 1 ANTERIOR LUMBAR INTERBODY FUSION, POSTERIOR INSTRUMENTED FUSION WITH O ARM AND STEALTH NAVIGATION  SURGICAL EXPOSURE, ANTERIOR APPROACH, WITH WOUND CLOSURE, FOR LUMBAR SPINE SURGERY, BY GENERAL SURGERY    Still in pain from the surgery and rated pain as severe in the morning- (rated around 6 out of ten)    Rated pain as 3-4 out of ten right now as he is elevating and using ice for pain in his back.    Using oxycodone for pain and he is running out-has 2-5 mg oxycodone left-using about 4 oxycodone per day.    Not using anything else for pain at this time    Will see the surgeon on 7/26/24 for a post-operative follow up appointment.    Patient had been prescribed Dilaudid for pain recently, but it was not as effective for pain    Patient is requesting a refill of oxycodone from the provider for pain.    Incision on back looks good and denies other questions or concerns at this time.    Writer will route the above request to the PCP and covering provider to review and advise next steps.    Tisha Ibarra RN, BSN  [facility]

## 2024-07-09 NOTE — TELEPHONE ENCOUNTER
Writer called patient and left message to return call to clinic.     If patient returns call please  route to nurse queue to discuss notes per PCP and get an update on patient's current symptoms per provider request .    RODOLFO Lowery, RN  [facility]

## 2024-07-21 ENCOUNTER — HEALTH MAINTENANCE LETTER (OUTPATIENT)
Age: 64
End: 2024-07-21

## 2024-07-23 ENCOUNTER — TRANSFERRED RECORDS (OUTPATIENT)
Dept: HEALTH INFORMATION MANAGEMENT | Facility: CLINIC | Age: 64
End: 2024-07-23
Payer: COMMERCIAL

## 2024-08-02 ENCOUNTER — TELEPHONE (OUTPATIENT)
Dept: SURGERY | Facility: CLINIC | Age: 64
End: 2024-08-02
Payer: COMMERCIAL

## 2024-08-02 DIAGNOSIS — G89.29 OTHER CHRONIC PAIN: ICD-10-CM

## 2024-08-02 DIAGNOSIS — F90.0 ATTENTION-DEFICIT HYPERACTIVITY DISORDER, PREDOMINANTLY INATTENTIVE TYPE: ICD-10-CM

## 2024-08-02 DIAGNOSIS — M48.061 SPINAL STENOSIS OF LUMBAR REGION WITHOUT NEUROGENIC CLAUDICATION: ICD-10-CM

## 2024-08-02 DIAGNOSIS — G47.00 INSOMNIA, UNSPECIFIED TYPE: ICD-10-CM

## 2024-08-02 NOTE — TELEPHONE ENCOUNTER
Patient is s/p robot-assisted laparoscopic right inguinal hernia repair.    Patient reports after surgery he developed a small seroma in the right groin. Dr. Rm reported that this could be aspirated if it did not go away. Patient states that the seroma is still present. It has not grown in size nor has it gotten smaller. No significant pain in area but if palpating the area it is noticeable. Patient would like to follow-up with Dr. Rm to see if this can be aspirated at this time.     Patient scheduled to see Dr. Rm on 8/15/2024.     Quyen HUERTA   RN, BSN    [facility]  General Surgery  [address]  [address]  [place] [address]  Office: [phone]  Employed by [employer]

## 2024-08-02 NOTE — TELEPHONE ENCOUNTER
Patient states MR told him he could have his seroma aspirated in a few months. He's not sure where this would be done. Can someone call him.    Thank you

## 2024-08-04 RX ORDER — DEXTROAMPHETAMINE SACCHARATE, AMPHETAMINE ASPARTATE MONOHYDRATE, DEXTROAMPHETAMINE SULFATE AND AMPHETAMINE SULFATE 3.75; 3.75; 3.75; 3.75 MG/1; MG/1; MG/1; MG/1
15 CAPSULE, EXTENDED RELEASE ORAL DAILY
Qty: 30 CAPSULE | Refills: 0 | Status: SHIPPED | OUTPATIENT
Start: 2024-08-07 | End: 2024-09-11

## 2024-08-04 RX ORDER — ZOLPIDEM TARTRATE 5 MG/1
5 TABLET ORAL
Qty: 30 TABLET | Refills: 0 | Status: SHIPPED | OUTPATIENT
Start: 2024-08-07 | End: 2024-09-11

## 2024-08-04 RX ORDER — OXYCODONE HYDROCHLORIDE 5 MG/1
5 TABLET ORAL EVERY 6 HOURS PRN
Qty: 120 TABLET | Refills: 0 | Status: SHIPPED | OUTPATIENT
Start: 2024-08-07 | End: 2024-09-11

## 2024-08-15 ENCOUNTER — OFFICE VISIT (OUTPATIENT)
Dept: SURGERY | Facility: CLINIC | Age: 64
End: 2024-08-15
Payer: COMMERCIAL

## 2024-08-15 DIAGNOSIS — R19.09 INGUINAL MASS: Primary | ICD-10-CM

## 2024-08-15 PROCEDURE — 99212 OFFICE O/P EST SF 10 MIN: CPT | Performed by: SURGERY

## 2024-08-15 NOTE — LETTER
8/15/2024      Segundo Sellers  [address] [place] [address] [address]  [place] [address]      Dear Colleague,    Thank you for referring your patient, Segundo Sellers, to the [facility] [place]. Please see a copy of my visit note below.    General surgery clinic follow-up    Patient is a 64-year-old man who is about a year out from an uneventful robotic right inguinal hernia repair.  He presented after surgery with a small lump in the groin that I suspected was a seroma.  He now comes back again a year later to have this reevaluated.  He continues to have a small mass in his groin.  He thinks that it may get larger at times.      On exam patient has a lump along his cord structures at the external ring.  This is not reducible.  It is mildly tender to palpation.    Patient with persistent nonreducible lump in the right inguinal region.  From his description, it sounds like it may be a seroma that has some communication to the intra-abdominal space.  It is not hernia per my exam.  Lower likelihood that it could be some sort of mass.     Would like to start with a formal ultrasound to evaluate this mass.  If it turns out to be a seroma.  We could discussed the risks and benefits of aspiration.    Neftaly Rm MD  General Surgeon  [facility]  Surgery Clinic - [address]  [place] [address]  Office: [phone]      Again, thank you for allowing me to participate in the care of your patient.        Sincerely,        Neftaly Rm MD

## 2024-08-15 NOTE — PROGRESS NOTES
General surgery clinic follow-up    Patient is a 64-year-old man who is about a year out from an uneventful robotic right inguinal hernia repair.  He presented after surgery with a small lump in the groin that I suspected was a seroma.  He now comes back again a year later to have this reevaluated.  He continues to have a small mass in his groin.  He thinks that it may get larger at times.      On exam patient has a lump along his cord structures at the external ring.  This is not reducible.  It is mildly tender to palpation.    Patient with persistent nonreducible lump in the right inguinal region.  From his description, it sounds like it may be a seroma that has some communication to the intra-abdominal space.  It is not hernia per my exam.  Lower likelihood that it could be some sort of mass.     Would like to start with a formal ultrasound to evaluate this mass.  If it turns out to be a seroma.  We could discussed the risks and benefits of aspiration.    Neftaly Rm MD  General Surgeon  [facility]  Surgery [facility] - [address] [address]  Office: [phone]

## 2024-08-16 ENCOUNTER — HOSPITAL ENCOUNTER (OUTPATIENT)
Dept: ULTRASOUND IMAGING | Facility: CLINIC | Age: 64
Discharge: HOME | End: 2024-08-16
Attending: SURGERY | Admitting: SURGERY
Payer: COMMERCIAL

## 2024-08-16 DIAGNOSIS — R19.09 INGUINAL MASS: ICD-10-CM

## 2024-08-16 PROCEDURE — 76705 ECHO EXAM OF ABDOMEN: CPT

## 2024-09-06 ENCOUNTER — TRANSFERRED RECORDS (OUTPATIENT)
Dept: HEALTH INFORMATION MANAGEMENT | Facility: CLINIC | Age: 64
End: 2024-09-06
Payer: COMMERCIAL

## 2024-09-10 DIAGNOSIS — M48.061 SPINAL STENOSIS OF LUMBAR REGION WITHOUT NEUROGENIC CLAUDICATION: ICD-10-CM

## 2024-09-10 DIAGNOSIS — G89.29 OTHER CHRONIC PAIN: ICD-10-CM

## 2024-09-10 DIAGNOSIS — F90.0 ATTENTION-DEFICIT HYPERACTIVITY DISORDER, PREDOMINANTLY INATTENTIVE TYPE: ICD-10-CM

## 2024-09-10 DIAGNOSIS — G47.00 INSOMNIA, UNSPECIFIED TYPE: ICD-10-CM

## 2024-09-11 RX ORDER — DEXTROAMPHETAMINE SACCHARATE, AMPHETAMINE ASPARTATE MONOHYDRATE, DEXTROAMPHETAMINE SULFATE AND AMPHETAMINE SULFATE 3.75; 3.75; 3.75; 3.75 MG/1; MG/1; MG/1; MG/1
15 CAPSULE, EXTENDED RELEASE ORAL DAILY
Qty: 30 CAPSULE | Refills: 0 | Status: SHIPPED | OUTPATIENT
Start: 2024-09-11 | End: 2024-10-07

## 2024-09-11 RX ORDER — ZOLPIDEM TARTRATE 5 MG/1
5 TABLET ORAL
Qty: 30 TABLET | Refills: 0 | Status: SHIPPED | OUTPATIENT
Start: 2024-09-11

## 2024-09-11 RX ORDER — OXYCODONE HYDROCHLORIDE 5 MG/1
5 TABLET ORAL EVERY 6 HOURS PRN
Qty: 120 TABLET | Refills: 0 | Status: SHIPPED | OUTPATIENT
Start: 2024-09-11

## 2024-10-02 SDOH — HEALTH STABILITY: PHYSICAL HEALTH: ON AVERAGE, HOW MANY DAYS PER WEEK DO YOU ENGAGE IN MODERATE TO STRENUOUS EXERCISE (LIKE A BRISK WALK)?: 3 DAYS

## 2024-10-02 SDOH — HEALTH STABILITY: PHYSICAL HEALTH: ON AVERAGE, HOW MANY MINUTES DO YOU ENGAGE IN EXERCISE AT THIS LEVEL?: 40 MIN

## 2024-10-02 ASSESSMENT — SOCIAL DETERMINANTS OF HEALTH (SDOH): HOW OFTEN DO YOU GET TOGETHER WITH FRIENDS OR RELATIVES?: ONCE A WEEK

## 2024-10-07 ENCOUNTER — OFFICE VISIT (OUTPATIENT)
Dept: FAMILY MEDICINE | Facility: CLINIC | Age: 64
End: 2024-10-07
Payer: COMMERCIAL

## 2024-10-07 VITALS
SYSTOLIC BLOOD PRESSURE: 120 MMHG | OXYGEN SATURATION: 99 % | HEIGHT: 73 IN | RESPIRATION RATE: 16 BRPM | DIASTOLIC BLOOD PRESSURE: 70 MMHG | TEMPERATURE: 98.3 F | BODY MASS INDEX: 28.59 KG/M2 | WEIGHT: 215.7 LBS | HEART RATE: 52 BPM

## 2024-10-07 DIAGNOSIS — G47.00 INSOMNIA, UNSPECIFIED TYPE: ICD-10-CM

## 2024-10-07 DIAGNOSIS — G89.29 OTHER CHRONIC PAIN: Primary | ICD-10-CM

## 2024-10-07 DIAGNOSIS — N52.9 ERECTILE DYSFUNCTION, UNSPECIFIED ERECTILE DYSFUNCTION TYPE: ICD-10-CM

## 2024-10-07 DIAGNOSIS — M48.061 SPINAL STENOSIS OF LUMBAR REGION WITHOUT NEUROGENIC CLAUDICATION: ICD-10-CM

## 2024-10-07 DIAGNOSIS — F90.0 ATTENTION-DEFICIT HYPERACTIVITY DISORDER, PREDOMINANTLY INATTENTIVE TYPE: ICD-10-CM

## 2024-10-07 LAB — CREAT UR-MCNC: 107 MG/DL

## 2024-10-07 PROCEDURE — 90673 RIV3 VACCINE NO PRESERV IM: CPT | Performed by: FAMILY MEDICINE

## 2024-10-07 PROCEDURE — 99214 OFFICE O/P EST MOD 30 MIN: CPT | Mod: 25 | Performed by: FAMILY MEDICINE

## 2024-10-07 PROCEDURE — 90471 IMMUNIZATION ADMIN: CPT | Performed by: FAMILY MEDICINE

## 2024-10-07 PROCEDURE — G0481 DRUG TEST DEF 8-14 CLASSES: HCPCS | Performed by: FAMILY MEDICINE

## 2024-10-07 PROCEDURE — 91320 SARSCV2 VAC 30MCG TRS-SUC IM: CPT | Performed by: FAMILY MEDICINE

## 2024-10-07 PROCEDURE — 90480 ADMN SARSCOV2 VAC 1/ONLY CMP: CPT | Performed by: FAMILY MEDICINE

## 2024-10-07 RX ORDER — SILDENAFIL 100 MG/1
100 TABLET, FILM COATED ORAL DAILY PRN
Qty: 30 TABLET | Refills: 1 | Status: SHIPPED | OUTPATIENT
Start: 2024-10-07

## 2024-10-07 ASSESSMENT — PAIN SCALES - GENERAL: PAINLEVEL: NO PAIN (0)

## 2024-10-07 NOTE — PROGRESS NOTES
"Segundo Sellers  /70   Pulse 52   Temp 98.3  F (36.8  C) (Temporal)   Resp 16   Ht 1.854 m (6' 1\")   Wt 97.8 kg (215 lb 11.2 oz)   SpO2 99%   BMI 28.46 kg/m       Assessment/Plan:                Segundo was seen today for physical.    Diagnoses and all orders for this visit:    Other chronic pain    Erectile dysfunction, unspecified erectile dysfunction type  -     sildenafil (VIAGRA) 100 MG tablet; Take 1 tablet (100 mg) by mouth daily as needed.    Spinal stenosis of lumbar region without neurogenic claudication    Insomnia, unspecified type    Attention-deficit hyperactivity disorder, predominantly inattentive type    Other orders  -     INFLUENZA VACCINE TRIVALENT(FLUBLOK)  -     COVID-19 12+ (PFIZER)         DISCUSSION  Controlled substance agreement signed.  Urine tox to be obtained today.  Begin weaning off zolpidem.  Stop further use of ADHD medication communicate with me if there are concerns with this process in any regard.  Continue current use of pain medication.  Subjective:     HPI:    Segundo Sellers is a 64 year old male is here today to discuss chronic medical concerns.    He has pain stemming from spinal stenosis status post surgery this past June.  He has ongoing pain from multisite osteoarthritis including history of knee replacement surgery.  He takes oxycodone 20 mg total per day divided into 4 doses of 5 mg each.  There is no evidence of misuse or diversion of medication.  Today we discussed controlled substance agreement and all that it entails including the information within the controlled substance agreement.  Feel this appropriate to continue medication usage.  Reviewed other medications that are pertinent.  See discussion below.  Obtain urine tox screen today.  Will continue the current course of treatment.  Current course of treatment loss restoration of normal function without causing significant side effects.    He has been on zolpidem for insomnia.  We discussed " beginning titration off of zolpidem.  Reviewed additive side effects in conjunction with use of oxycodone.    He had had depression symptoms along with ADHD especially following the death of his wife he benefited tremendously from taking stimulant medication.  At this time we discussed beginning to stop the medication as we feel it is most appropriate to do so.    Reviewed recent routine screening labs felt additional lab work was not needed at this time.    We talked about appropriate immunizations.    We need to obtain his most recent colonoscopy report.    ROS:  Complete review of systems is obtained.  Other than the specific considerations noted above complete review of systems is negative.          Objective:   Medications:  Current Outpatient Medications   Medication Sig Dispense Refill    acetaminophen (TYLENOL) 325 MG tablet Take 2 tablets (650 mg) by mouth every 4 hours as needed for other (For optimal non-opioid multimodal pain management to improve pain control.) 100 tablet 0    albuterol (PROAIR HFA) 90 mcg/actuation inhaler Inhale 1-2 puffs into the lungs every 4 hours as needed      docusate sodium (COLACE) 100 MG capsule Take 1 capsule (100 mg) by mouth daily      fluticasone (FLONASE) 50 MCG/ACT nasal spray Spray 2 sprays into both nostrils daily as needed for rhinitis or allergies      gabapentin (NEURONTIN) 100 MG capsule Take 2 capsules (200 mg) by mouth 2 times daily 30 capsule 0    methocarbamol (ROBAXIN) 750 MG tablet Take 1 tablet (750 mg) by mouth every 6 hours 30 tablet 0    montelukast (SINGULAIR) 10 mg tablet Take 10 mg by mouth daily      oxyCODONE (ROXICODONE) 5 MG tablet TAKE 1 TABLET (5 MG) BY MOUTH EVERY 6 HOURS AS NEEDED FOR SEVERE PAIN 120 tablet 0    senna-docusate (SENOKOT-S/PERICOLACE) 8.6-50 MG tablet Take 2 tablets by mouth 2 times daily 60 tablet 0    sildenafil (VIAGRA) 100 MG tablet Take 1 tablet (100 mg) by mouth daily as needed. 30 tablet 1    zolpidem (AMBIEN) 5 MG tablet  TAKE 1 TABLET (5 MG) BY MOUTH NIGHTLY AS NEEDED FOR SLEEP 30 tablet 0     No current facility-administered medications for this visit.        Allergies:   No Known Allergies     Social History     Socioeconomic History    Marital status:      Spouse name: Not on file    Number of children: Not on file    Years of education: Not on file    Highest education level: Not on file   Occupational History    Not on file   Tobacco Use    Smoking status: Never     Passive exposure: Never    Smokeless tobacco: Former     Types: Chew   Vaping Use    Vaping status: Never Used   Substance and Sexual Activity    Alcohol use: Yes     Alcohol/week: 2.0 standard drinks of alcohol     Types: 2 Standard drinks or equivalent per week     Comment: 4 per week    Drug use: No    Sexual activity: Yes     Partners: Female     Comment:    Other Topics Concern    Not on file   Social History Narrative    Not on file     Social Determinants of Health     Financial Resource Strain: Low Risk  (10/2/2024)    Financial Resource Strain     Within the past 12 months, have you or your family members you live with been unable to get utilities (heat, electricity) when it was really needed?: No   Food Insecurity: Low Risk  (10/2/2024)    Food Insecurity     Within the past 12 months, did you worry that your food would run out before you got money to buy more?: No     Within the past 12 months, did the food you bought just not last and you didn t have money to get more?: No   Transportation Needs: Low Risk  (10/2/2024)    Transportation Needs     Within the past 12 months, has lack of transportation kept you from medical appointments, getting your medicines, non-medical meetings or appointments, work, or from getting things that you need?: No   Physical Activity: Insufficiently Active (10/2/2024)    Exercise Vital Sign     Days of Exercise per Week: 3 days     Minutes of Exercise per Session: 40 min   Stress: Stress Concern Present  (10/2/2024)    [employer] - Occupational Stress Questionnaire     Feeling of Stress : Rather much   Social Connections: Unknown (10/2/2024)    Social Connection and Isolation Panel [NHANES]     Frequency of Communication with Friends and Family: Not on file     Frequency of Social Gatherings with Friends and Family: Once a week     Attends Hinduism Services: Not on file     Active Member of Clubs or Organizations: Not on file     Attends Club or Organization Meetings: Not on file     Marital Status: Not on file   Interpersonal Safety: Low Risk  (10/7/2024)    Interpersonal Safety     Do you feel physically and emotionally safe where you currently live?: Yes     Within the past 12 months, have you been hit, slapped, kicked or otherwise physically hurt by someone?: No     Within the past 12 months, have you been humiliated or emotionally abused in other ways by your partner or ex-partner?: No   Housing Stability: Low Risk  (10/2/2024)    Housing Stability     Do you have housing? : Yes     Are you worried about losing your housing?: No       Family History   Problem Relation Age of Onset    No Known Problems Mother     Hypertension Father         Most Recent Immunizations   Administered Date(s) Administered    COVID-19 12+ (Pfizer) 12/11/2023    COVID-19 MONOVALENT 12+ (Pfizer) 01/04/2022    COVID-19 Monovalent 12+ (Pfizer 2022) 04/13/2022    Flu, Unspecified 01/01/2022    Influenza (IIV3) PF 12/17/2014    Influenza Vaccine 18-64 (Flublok) 09/28/2023    Influenza Vaccine >6 months,quad, PF 11/23/2020    Pneumococcal 23 valent 03/02/2012    RSV Vaccine (Abrysvo) 12/11/2023    TD,PF 7+ (Tenivac) 02/20/2020    TDAP (Adacel,Boostrix) 04/02/2019    Tdap (Adult) Unspecified Formulation 01/23/2009   Pended Date(s) Pended    COVID-19 12+ (Pfizer) 10/07/2024    Influenza Vaccine Trivalent (FluBlok) 10/07/2024        Wt Readings from Last 3 Encounters:   10/07/24 97.8 kg (215 lb 11.2 oz)  "  06/11/24 95.3 kg (210 lb)   05/24/24 97.2 kg (214 lb 4.8 oz)        BP Readings from Last 6 Encounters:   10/07/24 120/70   06/14/24 125/76   05/24/24 132/76   12/11/23 130/78   09/28/23 133/84   08/02/23 127/60        Hemoglobin A1C   Date Value Ref Range Status   05/24/2024 5.7 (H) 0.0 - 5.6 % Final     Comment:     Normal <5.7%   Prediabetes 5.7-6.4%    Diabetes 6.5% or higher     Note: Adopted from ADA consensus guidelines.   03/03/2023 5.7 (H) 0.0 - 5.6 % Final     Comment:     Normal <5.7%   Prediabetes 5.7-6.4%    Diabetes 6.5% or higher     Note: Adopted from ADA consensus guidelines.   02/20/2020 6.0 3.5 - 6.0 % Final              PHYSICAL EXAM:    /70   Pulse 52   Temp 98.3  F (36.8  C) (Temporal)   Resp 16   Ht 1.854 m (6' 1\")   Wt 97.8 kg (215 lb 11.2 oz)   SpO2 99%   BMI 28.46 kg/m       General: Patient alert no signs of distress    Walks with a normal gait he has no signs of neurologic dysfunction.                          Answers submitted by the patient for this visit:  Patient Health Questionnaire (Submitted on 10/7/2024)  If you checked off any problems, how difficult have these problems made it for you to do your work, take care of things at home, or get along with other people?: Not difficult at all  PHQ9 TOTAL SCORE: 5    "

## 2024-10-07 NOTE — LETTER

## 2024-10-09 LAB
AMPHET UR CFM-MCNC: 1700 NG/ML
AMPHET/CREAT UR: 1589 NG/MG {CREAT}
OXYCODONE UR CFM-MCNC: 387 NG/ML
OXYCODONE/CREAT UR: 362 NG/MG {CREAT}
OXYMORPHONE UR CFM-MCNC: 914 NG/ML
OXYMORPHONE/CREAT UR: 854 NG/MG {CREAT}

## 2024-10-14 DIAGNOSIS — G47.00 INSOMNIA, UNSPECIFIED TYPE: ICD-10-CM

## 2024-10-14 DIAGNOSIS — G89.29 OTHER CHRONIC PAIN: ICD-10-CM

## 2024-10-14 DIAGNOSIS — M48.061 SPINAL STENOSIS OF LUMBAR REGION WITHOUT NEUROGENIC CLAUDICATION: ICD-10-CM

## 2024-10-14 RX ORDER — OXYCODONE HYDROCHLORIDE 5 MG/1
5 TABLET ORAL EVERY 6 HOURS PRN
Qty: 120 TABLET | Refills: 0 | Status: SHIPPED | OUTPATIENT
Start: 2024-10-14 | End: 2024-11-06

## 2024-10-14 RX ORDER — ZOLPIDEM TARTRATE 5 MG/1
5 TABLET ORAL
Qty: 30 TABLET | Refills: 0 | Status: SHIPPED | OUTPATIENT
Start: 2024-10-14

## 2024-11-06 DIAGNOSIS — G89.29 OTHER CHRONIC PAIN: ICD-10-CM

## 2024-11-06 DIAGNOSIS — J31.0 CHRONIC RHINITIS: Primary | ICD-10-CM

## 2024-11-06 DIAGNOSIS — M48.061 SPINAL STENOSIS OF LUMBAR REGION WITHOUT NEUROGENIC CLAUDICATION: ICD-10-CM

## 2024-11-06 RX ORDER — FLUTICASONE PROPIONATE 50 MCG
SPRAY, SUSPENSION (ML) NASAL
Qty: 16 G | Refills: 4 | Status: SHIPPED | OUTPATIENT
Start: 2024-11-06

## 2024-11-06 RX ORDER — OXYCODONE HYDROCHLORIDE 5 MG/1
5 TABLET ORAL EVERY 6 HOURS PRN
Qty: 120 TABLET | Refills: 0 | Status: SHIPPED | OUTPATIENT
Start: 2024-11-11

## 2024-11-11 ENCOUNTER — TRANSFERRED RECORDS (OUTPATIENT)
Dept: HEALTH INFORMATION MANAGEMENT | Facility: CLINIC | Age: 64
End: 2024-11-11
Payer: COMMERCIAL

## 2024-12-02 DIAGNOSIS — M48.061 SPINAL STENOSIS OF LUMBAR REGION WITHOUT NEUROGENIC CLAUDICATION: ICD-10-CM

## 2024-12-02 DIAGNOSIS — G89.29 OTHER CHRONIC PAIN: ICD-10-CM

## 2024-12-02 RX ORDER — OXYCODONE HYDROCHLORIDE 5 MG/1
5 TABLET ORAL EVERY 6 HOURS PRN
Qty: 120 TABLET | Refills: 0 | Status: SHIPPED | OUTPATIENT
Start: 2024-12-02

## 2025-01-08 DIAGNOSIS — G89.29 OTHER CHRONIC PAIN: ICD-10-CM

## 2025-01-08 DIAGNOSIS — M48.061 SPINAL STENOSIS OF LUMBAR REGION WITHOUT NEUROGENIC CLAUDICATION: ICD-10-CM

## 2025-01-08 RX ORDER — OXYCODONE HYDROCHLORIDE 5 MG/1
5 TABLET ORAL EVERY 6 HOURS PRN
Qty: 120 TABLET | Refills: 0 | Status: SHIPPED | OUTPATIENT
Start: 2025-01-08

## 2025-02-05 DIAGNOSIS — G89.29 OTHER CHRONIC PAIN: ICD-10-CM

## 2025-02-05 DIAGNOSIS — M48.061 SPINAL STENOSIS OF LUMBAR REGION WITHOUT NEUROGENIC CLAUDICATION: ICD-10-CM

## 2025-02-05 RX ORDER — OXYCODONE HYDROCHLORIDE 5 MG/1
5 TABLET ORAL EVERY 6 HOURS PRN
Qty: 120 TABLET | Refills: 0 | Status: SHIPPED | OUTPATIENT
Start: 2025-02-05

## 2025-03-04 DIAGNOSIS — G89.29 OTHER CHRONIC PAIN: ICD-10-CM

## 2025-03-04 DIAGNOSIS — M48.061 SPINAL STENOSIS OF LUMBAR REGION WITHOUT NEUROGENIC CLAUDICATION: ICD-10-CM

## 2025-03-04 RX ORDER — OXYCODONE HYDROCHLORIDE 5 MG/1
5 TABLET ORAL EVERY 6 HOURS PRN
Qty: 120 TABLET | Refills: 0 | Status: SHIPPED | OUTPATIENT
Start: 2025-03-04

## 2025-04-02 SDOH — HEALTH STABILITY: PHYSICAL HEALTH: ON AVERAGE, HOW MANY DAYS PER WEEK DO YOU ENGAGE IN MODERATE TO STRENUOUS EXERCISE (LIKE A BRISK WALK)?: 3 DAYS

## 2025-04-02 SDOH — HEALTH STABILITY: PHYSICAL HEALTH: ON AVERAGE, HOW MANY MINUTES DO YOU ENGAGE IN EXERCISE AT THIS LEVEL?: 60 MIN

## 2025-04-02 ASSESSMENT — ASTHMA QUESTIONNAIRES
QUESTION_1 LAST FOUR WEEKS HOW MUCH OF THE TIME DID YOUR ASTHMA KEEP YOU FROM GETTING AS MUCH DONE AT WORK, SCHOOL OR AT HOME: NONE OF THE TIME
QUESTION_3 LAST FOUR WEEKS HOW OFTEN DID YOUR ASTHMA SYMPTOMS (WHEEZING, COUGHING, SHORTNESS OF BREATH, CHEST TIGHTNESS OR PAIN) WAKE YOU UP AT NIGHT OR EARLIER THAN USUAL IN THE MORNING: NOT AT ALL
QUESTION_2 LAST FOUR WEEKS HOW OFTEN HAVE YOU HAD SHORTNESS OF BREATH: NOT AT ALL
QUESTION_5 LAST FOUR WEEKS HOW WOULD YOU RATE YOUR ASTHMA CONTROL: WELL CONTROLLED
ACT_TOTALSCORE: 23
QUESTION_4 LAST FOUR WEEKS HOW OFTEN HAVE YOU USED YOUR RESCUE INHALER OR NEBULIZER MEDICATION (SUCH AS ALBUTEROL): ONCE A WEEK OR LESS

## 2025-04-02 ASSESSMENT — SOCIAL DETERMINANTS OF HEALTH (SDOH): HOW OFTEN DO YOU GET TOGETHER WITH FRIENDS OR RELATIVES?: NEVER

## 2025-04-02 ASSESSMENT — ANXIETY QUESTIONNAIRES
4. TROUBLE RELAXING: MORE THAN HALF THE DAYS
3. WORRYING TOO MUCH ABOUT DIFFERENT THINGS: NEARLY EVERY DAY
7. FEELING AFRAID AS IF SOMETHING AWFUL MIGHT HAPPEN: SEVERAL DAYS
5. BEING SO RESTLESS THAT IT IS HARD TO SIT STILL: MORE THAN HALF THE DAYS
GAD7 TOTAL SCORE: 16
1. FEELING NERVOUS, ANXIOUS, OR ON EDGE: NEARLY EVERY DAY
IF YOU CHECKED OFF ANY PROBLEMS ON THIS QUESTIONNAIRE, HOW DIFFICULT HAVE THESE PROBLEMS MADE IT FOR YOU TO DO YOUR WORK, TAKE CARE OF THINGS AT HOME, OR GET ALONG WITH OTHER PEOPLE: NOT DIFFICULT AT ALL
6. BECOMING EASILY ANNOYED OR IRRITABLE: MORE THAN HALF THE DAYS
2. NOT BEING ABLE TO STOP OR CONTROL WORRYING: NEARLY EVERY DAY
7. FEELING AFRAID AS IF SOMETHING AWFUL MIGHT HAPPEN: SEVERAL DAYS
8. IF YOU CHECKED OFF ANY PROBLEMS, HOW DIFFICULT HAVE THESE MADE IT FOR YOU TO DO YOUR WORK, TAKE CARE OF THINGS AT HOME, OR GET ALONG WITH OTHER PEOPLE?: NOT DIFFICULT AT ALL

## 2025-04-03 ENCOUNTER — PATIENT OUTREACH (OUTPATIENT)
Dept: CARE COORDINATION | Facility: CLINIC | Age: 65
End: 2025-04-03
Payer: COMMERCIAL

## 2025-04-06 ASSESSMENT — PATIENT HEALTH QUESTIONNAIRE - PHQ9
SUM OF ALL RESPONSES TO PHQ QUESTIONS 1-9: 15
SUM OF ALL RESPONSES TO PHQ QUESTIONS 1-9: 15
10. IF YOU CHECKED OFF ANY PROBLEMS, HOW DIFFICULT HAVE THESE PROBLEMS MADE IT FOR YOU TO DO YOUR WORK, TAKE CARE OF THINGS AT HOME, OR GET ALONG WITH OTHER PEOPLE: NOT DIFFICULT AT ALL

## 2025-04-07 ENCOUNTER — OFFICE VISIT (OUTPATIENT)
Dept: FAMILY MEDICINE | Facility: CLINIC | Age: 65
End: 2025-04-07
Payer: COMMERCIAL

## 2025-04-07 VITALS
RESPIRATION RATE: 20 BRPM | HEIGHT: 73 IN | OXYGEN SATURATION: 97 % | DIASTOLIC BLOOD PRESSURE: 76 MMHG | TEMPERATURE: 98.1 F | SYSTOLIC BLOOD PRESSURE: 128 MMHG | WEIGHT: 213.4 LBS | BODY MASS INDEX: 28.28 KG/M2 | HEART RATE: 63 BPM

## 2025-04-07 DIAGNOSIS — E78.5 DYSLIPIDEMIA: ICD-10-CM

## 2025-04-07 DIAGNOSIS — R73.03 PREDIABETES: ICD-10-CM

## 2025-04-07 DIAGNOSIS — F90.0 ATTENTION-DEFICIT HYPERACTIVITY DISORDER, PREDOMINANTLY INATTENTIVE TYPE: ICD-10-CM

## 2025-04-07 DIAGNOSIS — R06.89 GASPING FOR BREATH: ICD-10-CM

## 2025-04-07 DIAGNOSIS — N52.9 ERECTILE DYSFUNCTION, UNSPECIFIED ERECTILE DYSFUNCTION TYPE: ICD-10-CM

## 2025-04-07 DIAGNOSIS — Z12.5 SCREENING FOR PROSTATE CANCER: ICD-10-CM

## 2025-04-07 DIAGNOSIS — M43.16 SPONDYLOLISTHESIS OF LUMBAR REGION: ICD-10-CM

## 2025-04-07 DIAGNOSIS — T14.8XXA BRUISING: ICD-10-CM

## 2025-04-07 DIAGNOSIS — Z00.00 ROUTINE GENERAL MEDICAL EXAMINATION AT A HEALTH CARE FACILITY: Primary | ICD-10-CM

## 2025-04-07 DIAGNOSIS — F32.1 CURRENT MODERATE EPISODE OF MAJOR DEPRESSIVE DISORDER WITHOUT PRIOR EPISODE (H): ICD-10-CM

## 2025-04-07 DIAGNOSIS — G47.00 INSOMNIA, UNSPECIFIED TYPE: ICD-10-CM

## 2025-04-07 DIAGNOSIS — R53.83 OTHER FATIGUE: ICD-10-CM

## 2025-04-07 DIAGNOSIS — G89.29 OTHER CHRONIC PAIN: ICD-10-CM

## 2025-04-07 DIAGNOSIS — M48.061 SPINAL STENOSIS OF LUMBAR REGION WITHOUT NEUROGENIC CLAUDICATION: ICD-10-CM

## 2025-04-07 DIAGNOSIS — R73.01 IMPAIRED FASTING GLUCOSE: ICD-10-CM

## 2025-04-07 DIAGNOSIS — E55.9 VITAMIN D DEFICIENCY: ICD-10-CM

## 2025-04-07 LAB
ALBUMIN SERPL BCG-MCNC: 4.5 G/DL (ref 3.5–5.2)
ALP SERPL-CCNC: 69 U/L (ref 40–150)
ALT SERPL W P-5'-P-CCNC: 28 U/L (ref 0–70)
ANION GAP SERPL CALCULATED.3IONS-SCNC: 13 MMOL/L (ref 7–15)
AST SERPL W P-5'-P-CCNC: 26 U/L (ref 0–45)
BASOPHILS # BLD AUTO: 0 10E3/UL (ref 0–0.2)
BASOPHILS NFR BLD AUTO: 0 %
BILIRUB SERPL-MCNC: 1.1 MG/DL
BUN SERPL-MCNC: 18.9 MG/DL (ref 8–23)
CALCIUM SERPL-MCNC: 9.9 MG/DL (ref 8.8–10.4)
CHLORIDE SERPL-SCNC: 101 MMOL/L (ref 98–107)
CHOLEST SERPL-MCNC: 206 MG/DL
CREAT SERPL-MCNC: 0.94 MG/DL (ref 0.67–1.17)
EGFRCR SERPLBLD CKD-EPI 2021: >90 ML/MIN/1.73M2
EOSINOPHIL # BLD AUTO: 0 10E3/UL (ref 0–0.7)
EOSINOPHIL NFR BLD AUTO: 0 %
ERYTHROCYTE [DISTWIDTH] IN BLOOD BY AUTOMATED COUNT: 12.3 % (ref 10–15)
EST. AVERAGE GLUCOSE BLD GHB EST-MCNC: 123 MG/DL
FASTING STATUS PATIENT QL REPORTED: NO
FASTING STATUS PATIENT QL REPORTED: NO
GLUCOSE SERPL-MCNC: 133 MG/DL (ref 70–99)
HBA1C MFR BLD: 5.9 % (ref 0–5.6)
HCO3 SERPL-SCNC: 24 MMOL/L (ref 22–29)
HCT VFR BLD AUTO: 41.1 % (ref 40–53)
HDLC SERPL-MCNC: 57 MG/DL
HGB BLD-MCNC: 14.5 G/DL (ref 13.3–17.7)
IMM GRANULOCYTES # BLD: 0 10E3/UL
IMM GRANULOCYTES NFR BLD: 1 %
LDLC SERPL CALC-MCNC: 115 MG/DL
LYMPHOCYTES # BLD AUTO: 0.8 10E3/UL (ref 0.8–5.3)
LYMPHOCYTES NFR BLD AUTO: 11 %
MCH RBC QN AUTO: 32 PG (ref 26.5–33)
MCHC RBC AUTO-ENTMCNC: 35.3 G/DL (ref 31.5–36.5)
MCV RBC AUTO: 91 FL (ref 78–100)
MONOCYTES # BLD AUTO: 0.1 10E3/UL (ref 0–1.3)
MONOCYTES NFR BLD AUTO: 2 %
NEUTROPHILS # BLD AUTO: 6.4 10E3/UL (ref 1.6–8.3)
NEUTROPHILS NFR BLD AUTO: 86 %
NONHDLC SERPL-MCNC: 149 MG/DL
PLATELET # BLD AUTO: 289 10E3/UL (ref 150–450)
POTASSIUM SERPL-SCNC: 4.6 MMOL/L (ref 3.4–5.3)
PROT SERPL-MCNC: 7.1 G/DL (ref 6.4–8.3)
PSA SERPL DL<=0.01 NG/ML-MCNC: 0.63 NG/ML (ref 0–4.5)
RBC # BLD AUTO: 4.53 10E6/UL (ref 4.4–5.9)
SODIUM SERPL-SCNC: 138 MMOL/L (ref 135–145)
TRIGL SERPL-MCNC: 169 MG/DL
TSH SERPL DL<=0.005 MIU/L-ACNC: 1.3 UIU/ML (ref 0.3–4.2)
VIT D+METAB SERPL-MCNC: 76 NG/ML (ref 20–50)
WBC # BLD AUTO: 7.4 10E3/UL (ref 4–11)

## 2025-04-07 PROCEDURE — G0103 PSA SCREENING: HCPCS | Performed by: FAMILY MEDICINE

## 2025-04-07 PROCEDURE — 3078F DIAST BP <80 MM HG: CPT | Performed by: FAMILY MEDICINE

## 2025-04-07 PROCEDURE — 99214 OFFICE O/P EST MOD 30 MIN: CPT | Mod: 25 | Performed by: FAMILY MEDICINE

## 2025-04-07 PROCEDURE — 36415 COLL VENOUS BLD VENIPUNCTURE: CPT | Performed by: FAMILY MEDICINE

## 2025-04-07 PROCEDURE — 80053 COMPREHEN METABOLIC PANEL: CPT | Performed by: FAMILY MEDICINE

## 2025-04-07 PROCEDURE — 84443 ASSAY THYROID STIM HORMONE: CPT | Performed by: FAMILY MEDICINE

## 2025-04-07 PROCEDURE — 85025 COMPLETE CBC W/AUTO DIFF WBC: CPT | Performed by: FAMILY MEDICINE

## 2025-04-07 PROCEDURE — 83036 HEMOGLOBIN GLYCOSYLATED A1C: CPT | Performed by: FAMILY MEDICINE

## 2025-04-07 PROCEDURE — 3074F SYST BP LT 130 MM HG: CPT | Performed by: FAMILY MEDICINE

## 2025-04-07 PROCEDURE — 80061 LIPID PANEL: CPT | Performed by: FAMILY MEDICINE

## 2025-04-07 PROCEDURE — 1125F AMNT PAIN NOTED PAIN PRSNT: CPT | Performed by: FAMILY MEDICINE

## 2025-04-07 PROCEDURE — 99396 PREV VISIT EST AGE 40-64: CPT | Performed by: FAMILY MEDICINE

## 2025-04-07 PROCEDURE — 82306 VITAMIN D 25 HYDROXY: CPT | Performed by: FAMILY MEDICINE

## 2025-04-07 RX ORDER — OXYCODONE HYDROCHLORIDE 5 MG/1
5 TABLET ORAL EVERY 4 HOURS PRN
Qty: 150 TABLET | Refills: 0 | Status: SHIPPED | OUTPATIENT
Start: 2025-04-07

## 2025-04-07 RX ORDER — DULOXETIN HYDROCHLORIDE 30 MG/1
30 CAPSULE, DELAYED RELEASE ORAL 2 TIMES DAILY
Qty: 30 CAPSULE | Refills: 3 | Status: SHIPPED | OUTPATIENT
Start: 2025-04-07 | End: 2025-04-08

## 2025-04-07 ASSESSMENT — PAIN SCALES - GENERAL: PAINLEVEL_OUTOF10: MODERATE PAIN (5)

## 2025-04-07 NOTE — PROGRESS NOTES
Preventive Care Visit  [facility]  Segundo Dejesus MD, MD, Family Medicine  Apr 7, 2025      Segundo was seen today for recheck medication and physical.    Diagnoses and all orders for this visit:    Routine general medical examination at a health care facility    Other chronic pain  -     oxyCODONE (ROXICODONE) 5 MG tablet; Take 1 tablet (5 mg) by mouth every 4 hours as needed for severe pain.  -     DULoxetine (CYMBALTA) 30 MG capsule; Take 1 capsule (30 mg) by mouth 2 times daily.    Spinal stenosis of lumbar region without neurogenic claudication  -     oxyCODONE (ROXICODONE) 5 MG tablet; Take 1 tablet (5 mg) by mouth every 4 hours as needed for severe pain.    Spondylolisthesis of lumbar region    Impaired Fasting Glucose    Dyslipidemia  -     Lipid panel reflex to direct LDL Fasting; Future  -     Comprehensive metabolic panel; Future  -     Lipid panel reflex to direct LDL Fasting  -     Comprehensive metabolic panel    Insomnia, unspecified type    Erectile dysfunction, unspecified erectile dysfunction type    Attention-deficit hyperactivity disorder, predominantly inattentive type    Prediabetes  -     Hemoglobin A1c; Future  -     Comprehensive metabolic panel; Future  -     Hemoglobin A1c  -     Comprehensive metabolic panel    Screening for prostate cancer  -     PSA, screen; Future  -     PSA, screen    Bruising  -     Comprehensive metabolic panel; Future  -     CBC with Platelets & Differential; Future  -     Comprehensive metabolic panel  -     CBC with Platelets & Differential    Current moderate episode of major depressive disorder without prior episode (H)  -     DULoxetine (CYMBALTA) 30 MG capsule; Take 1 capsule (30 mg) by mouth 2 times daily.    Gasping for breath    Other fatigue  -     TSH with free T4 reflex; Future  -     TSH with free T4 reflex    Vitamin D deficiency  -     Vitamin D Deficiency; Future  -     Vitamin D Deficiency           Becca mkceon  64 year old, presenting for the following:  Recheck Medication and Physical    His complicated history.  His wife  a couple of years ago.  He continues to grieve her loss.  He spent the winter in [place].  He is tearful today as he talks about his loneliness and ongoing pain issues.  He has chronic pain stemming from back pain issues including spinal stenosis and spondylolisthesis for which he has had surgery.  He is currently on a round of prednisone.  We discussed striving for better pain control which may help with some of his lack of motivation.  We also talked about his profound depression symptoms and discussed medication therapy.  Given his combination pain and depression we talked about the benefits of duloxetine.  We talked about aspects of self-care.  He does not wish to pursue medication consultation or cognitive behavioral therapy at this time.  We spent a fair amount of time today discussing all of this.  Does have historical diagnosis of ADHD and has benefited for a time by being on stimulant medications but we elected not to pursue this further.  Does have insomnia at 1 point was taking medication is not at this time.  Discussed gaining better control of depression symptoms to help with insomnia concerns.  He does snore it seems to be relatively minimal.  He wakes gasping on occasion and feels he is not getting good rest overall even when he does sleep.  We discussed referral for sleep apnea evaluation with sleep specialist.    He has dyslipidemia with increasing vascular disease risk which is relatively low but approaching 10% at this point based on age gender and cholesterol numbers.  We will reassess vascular disease risk and consider further if necessary.    He does have prediabetes we will recheck A1c.    He deals with some bruising on the arms which is chronic we will check blood counts he does not take any blood thinners he does not note bruising elsewhere this seems to be chronic and  stable he has discussed this previously with his dermatologist as well.    He is up-to-date with his pain medication contract.  No concerns exist at this time.  As mentioned discussed improving pain control at this point with the long-term goal of eventually getting off of medication if able.    Reviewed aspects of routine health prevention including prostate cancer screening, colon cancer screening, immunizations, routine dental care and eye care as well as continued follow-up with dermatology.  Discussed other specialty visits namely recent considerations related to his spine specialist.          4/7/2025     7:52 AM   Additional Questions   Roomed by am cma        Do you have a current opioid prescription? yes  Do you use any other controlled substances or medications that are not prescribed by a provider?  no          Advance Care Planning  Patient does not have a Health Care Directive: Advance Directive received and scanned. Click on Code in the patient header to view.      4/2/2025   General Health   How would you rate your overall physical health? Good   Feel stress (tense, anxious, or unable to sleep) Rather much   (!) STRESS CONCERN      4/2/2025   Nutrition   Diet: Regular (no restrictions)         4/2/2025   Exercise   Days per week of moderate/strenous exercise 3 days   Average minutes spent exercising at this level 60 min         4/2/2025   Social Factors   Frequency of gathering with friends or relatives Never   Worry food won't last until get money to buy more No   Food not last or not have enough money for food? No   Do you have housing? (Housing is defined as stable permanent housing and does not include staying ouside in a car, in a tent, in an abandoned building, in an overnight shelter, or couch-surfing.) Yes   Are you worried about losing your housing? No   Lack of transportation? No   Unable to get utilities (heat,electricity)? No   (!) SOCIAL CONNECTIONS CONCERN      4/2/2025   Fall Risk    Fallen 2 or more times in the past year? No   Trouble with walking or balance? No           No data to display                  4/2/2025   Dental   Dentist two times every year? Yes          No data to display                     No data to display                    10/2/2024   TB Screening   Were you born outside of the [place]? Yes         Today's PHQ-9 Score:       4/6/2025     9:27 AM   PHQ-9 SCORE   PHQ-9 Total Score MyChart 15 (Moderately severe depression)   PHQ-9 Total Score 15        Patient-reported         4/2/2025   Substance Use   Alcohol more than 3/day or more than 7/wk No   Do you use any other substances recreationally? No     Social History     Tobacco Use    Smoking status: Never     Passive exposure: Never    Smokeless tobacco: Former     Types: Chew   Vaping Use    Vaping status: Never Used   Substance Use Topics    Alcohol use: Yes     Alcohol/week: 2.0 standard drinks of alcohol     Types: 2 Standard drinks or equivalent per week     Comment: 4 per week    Drug use: No           4/2/2025   STI Screening   New sexual partner(s) since last STI/HIV test? No   Last PSA:   Prostate Specific Antigen Screen   Date Value Ref Range Status   05/24/2024 0.79 0.00 - 4.50 ng/mL Final   01/21/2014 0.7 <3.6 ng/mL Final     Comment:     Method is Abbott Prostate-Specific Antigen (PSA)            Standard-WHO 1st International (90:10) as of 09/26/05       ASCVD Risk   The 10-year ASCVD risk score (Benjamín JACOBO, et al., 2019) is: 10%    Values used to calculate the score:      Age: 64 years      Sex: Male      Is Non- : No      Diabetic: No      Tobacco smoker: No      Systolic Blood Pressure: 128 mmHg      Is BP treated: No      HDL Cholesterol: 56 mg/dL      Total Cholesterol: 175 mg/dL            Reviewed and updated as needed this visit by Provider                      Current providers sharing in care for this patient include:  Patient Care Team:  Segundo Dejesus MD as PCP -  "General (Family Medicine)  Segundo Dejesus MD as Assigned PCP  Segundo Dejesus MD as Assigned Pain Medication Provider  Neftaly Rm MD as Assigned Surgical Provider    The following health maintenance items are reviewed in Epic and correct as of today:  Health Maintenance   Topic Date Due    ASTHMA ACTION PLAN  Never done    DEPRESSION ACTION PLAN  Never done    MEDICARE ANNUAL WELLNESS VISIT  Never done    ZOSTER IMMUNIZATION (1 of 2) Never done    Pneumococcal Vaccine: 50+ Years (2 of 2 - PCV) 03/02/2013    ANNUAL REVIEW OF HM ORDERS  07/26/2024    LIPID  05/24/2025    ASTHMA CONTROL TEST  10/07/2025    PHQ-9  10/07/2025    DIABETES SCREENING  06/11/2027    DTAP/TDAP/TD IMMUNIZATION (5 - Td or Tdap) 02/20/2030    COLORECTAL CANCER SCREENING  03/27/2030    ADVANCE CARE PLANNING  04/07/2030    HEPATITIS C SCREENING  Completed    HIV SCREENING  Completed    INFLUENZA VACCINE  Completed    RSV VACCINE  Completed    COVID-19 Vaccine  Completed    HPV IMMUNIZATION  Aged Out    MENINGITIS IMMUNIZATION  Aged Out       Complete review of systems is obtained.  Other than the specific considerations noted above complete review of systems is negative.       Objective    Exam  /76   Pulse 63   Temp 98.1  F (36.7  C) (Oral)   Resp 20   Ht 1.854 m (6' 1\")   Wt 96.8 kg (213 lb 6.4 oz)   SpO2 97%   BMI 28.15 kg/m     Estimated body mass index is 28.15 kg/m  as calculated from the following:    Height as of this encounter: 1.854 m (6' 1\").    Weight as of this encounter: 96.8 kg (213 lb 6.4 oz).    Physical Exam        General Appearance:    Alert, cooperative, no distress   Eyes:   No scleral icterus or conjunctival irritation       Ears:    Normal TM's and external ear canals, both ears   Throat:   Lips, mucosa, and tongue normal; teeth and gums normal   Neck:   Supple, symmetrical, trachea midline, no adenopathy;        thyroid:  No enlargement/tenderness/nodules   Lungs:     Clear to auscultation " bilaterally, respirations unlabored, no wheezes or crackles   Heart:    Regular rate and rhythm,  No murmur   Abdomen:    Soft, no distention, no tenderness on palpation, no masses, no organomegaly     Extremities:  No edema, no joint swelling or redness, no evidence of any injuries   Skin: Some bruising is noted on the arms just above and just below the elbows which is chronic.  No other areas of bruising of concern.   Neurologic:  On gross examination there is no motor or sensory deficit.  Patient walks with a normal gait                      4/7/2025   Mini Cog   Clock Draw Score 2 Normal   3 Item Recall 3 objects recalled   Mini Cog Total Score 5         Vision Screen         Signed Electronically by: Segundo Dejesus MD, MD    Answers submitted by the patient for this visit:  Patient Health Questionnaire (Submitted on 4/6/2025)  If you checked off any problems, how difficult have these problems made it for you to do your work, take care of things at home, or get along with other people?: Not difficult at all  PHQ9 TOTAL SCORE: 15  Patient Health Questionnaire (G7) (Submitted on 4/2/2025)  ANTONIO 7 TOTAL SCORE: 16

## 2025-04-08 ENCOUNTER — VIRTUAL VISIT (OUTPATIENT)
Dept: FAMILY MEDICINE | Facility: CLINIC | Age: 65
End: 2025-04-08
Payer: COMMERCIAL

## 2025-04-08 DIAGNOSIS — T88.7XXA MEDICATION SIDE EFFECTS: ICD-10-CM

## 2025-04-08 DIAGNOSIS — G89.29 OTHER CHRONIC PAIN: ICD-10-CM

## 2025-04-08 DIAGNOSIS — R53.83 LETHARGY: ICD-10-CM

## 2025-04-08 DIAGNOSIS — R39.9 LOWER URINARY TRACT SYMPTOMS: Primary | ICD-10-CM

## 2025-04-08 DIAGNOSIS — F32.1 CURRENT MODERATE EPISODE OF MAJOR DEPRESSIVE DISORDER WITHOUT PRIOR EPISODE (H): ICD-10-CM

## 2025-04-08 PROCEDURE — 98013 SYNCH AUDIO-ONLY EST LOW 20: CPT | Performed by: FAMILY MEDICINE

## 2025-04-08 NOTE — PROGRESS NOTES
Segundo is a 64 year old who is being evaluated via a billable telephone visit.    What phone number would you like to be contacted at? [phone] (Mobile)   How would you like to obtain your AVS? [employer]  Originating Location (pt. Location): Home    Distant Location (provider location):  On-site  Telephone visit completed due to appropriateness    Segundo was seen today for recheck medication and depression.    Diagnoses and all orders for this visit:    Lower urinary tract symptoms    Current moderate episode of major depressive disorder without prior episode (H)    Other chronic pain    Lethargy    Medication side effects       Discussed that symptom concerns coincide with taking the duloxetine.  I think he is experiencing a side effect.  This may be due to other medications that he is taking currently including prednisone and oxycodone.  We elected to stop duloxetine monitor closely we will arrange for short-term follow-up to ensure symptoms return to a more completely normal state and then reevaluate options for managing mental health concerns.    Subjective   Segundo is a 64 year old, presenting for the following health issues:  Recheck Medication and Depression    HPI        He was seen yesterday.  We elected to start duloxetine for help in management of depression symptoms, anxiety symptoms along with concerns for helping to better manage chronic pain.  Patient started the medication yesterday.  He states he started to notice some difficulty with urination about 2 in the morning.  States it was difficult to pass the urine but was ultimately able to do so.  Urinary symptoms have largely returned to a more normal state but he feels somewhat lethargic.  Denies any presyncope or significant dizziness.  He is not short of breath no fevers or chills.  Throughout the day today after having taken an additional dose this morning patient reports that he is feeling somewhat better but does still feel somewhat  rundown.  Denies other symptoms of illness such as fevers chills cough shortness of breath chest pain sore throat abdominal pain.    He remains on prednisone for treatment of back pain.  He does take oxycodone chronically.    Complete review of systems is obtained.  Other than the specific considerations noted above complete review of systems is negative.        Objective             Vitals:  No vitals were obtained today due to virtual visit.    Physical Exam   General: Alert and no distress //Respiratory: No audible wheeze, cough, or shortness of breath // Psychiatric:  Appropriate affect, tone, and pace of words            Phone call duration: 15 minutes  Signed Electronically by: Segundo Dejesus MD, MD

## 2025-04-11 ENCOUNTER — TRANSFERRED RECORDS (OUTPATIENT)
Dept: HEALTH INFORMATION MANAGEMENT | Facility: CLINIC | Age: 65
End: 2025-04-11
Payer: COMMERCIAL

## 2025-04-15 ENCOUNTER — OFFICE VISIT (OUTPATIENT)
Dept: FAMILY MEDICINE | Facility: CLINIC | Age: 65
End: 2025-04-15
Payer: COMMERCIAL

## 2025-04-15 VITALS
WEIGHT: 211.6 LBS | HEART RATE: 66 BPM | TEMPERATURE: 98.3 F | RESPIRATION RATE: 22 BRPM | HEIGHT: 73 IN | BODY MASS INDEX: 28.04 KG/M2 | SYSTOLIC BLOOD PRESSURE: 138 MMHG | OXYGEN SATURATION: 99 % | DIASTOLIC BLOOD PRESSURE: 76 MMHG

## 2025-04-15 DIAGNOSIS — J02.9 ACUTE PHARYNGITIS, UNSPECIFIED ETIOLOGY: ICD-10-CM

## 2025-04-15 DIAGNOSIS — F41.9 ANXIETY: ICD-10-CM

## 2025-04-15 DIAGNOSIS — F32.1 CURRENT MODERATE EPISODE OF MAJOR DEPRESSIVE DISORDER WITHOUT PRIOR EPISODE (H): Primary | ICD-10-CM

## 2025-04-15 LAB
DEPRECATED S PYO AG THROAT QL EIA: NEGATIVE
S PYO DNA THROAT QL NAA+PROBE: NOT DETECTED

## 2025-04-15 PROCEDURE — 3075F SYST BP GE 130 - 139MM HG: CPT | Performed by: FAMILY MEDICINE

## 2025-04-15 PROCEDURE — 87651 STREP A DNA AMP PROBE: CPT | Performed by: FAMILY MEDICINE

## 2025-04-15 PROCEDURE — 99214 OFFICE O/P EST MOD 30 MIN: CPT | Performed by: FAMILY MEDICINE

## 2025-04-15 PROCEDURE — 1126F AMNT PAIN NOTED NONE PRSNT: CPT | Performed by: FAMILY MEDICINE

## 2025-04-15 PROCEDURE — 3078F DIAST BP <80 MM HG: CPT | Performed by: FAMILY MEDICINE

## 2025-04-15 RX ORDER — MELOXICAM 15 MG/1
15 TABLET ORAL DAILY
COMMUNITY
Start: 2025-04-11

## 2025-04-15 RX ORDER — SERTRALINE HYDROCHLORIDE 25 MG/1
TABLET, FILM COATED ORAL
Qty: 50 TABLET | Refills: 0 | Status: SHIPPED | OUTPATIENT
Start: 2025-04-15 | End: 2025-05-15

## 2025-04-15 RX ORDER — AMOXICILLIN 875 MG/1
875 TABLET, COATED ORAL 2 TIMES DAILY
Qty: 20 TABLET | Refills: 0 | Status: SHIPPED | OUTPATIENT
Start: 2025-04-15

## 2025-04-15 ASSESSMENT — PAIN SCALES - GENERAL: PAINLEVEL_OUTOF10: NO PAIN (0)

## 2025-04-15 NOTE — PROGRESS NOTES
"Segundo Sellers  /76   Pulse 66   Temp 98.3  F (36.8  C) (Oral)   Resp 22   Ht 1.854 m (6' 1\")   Wt 96 kg (211 lb 9.6 oz)   SpO2 99%   BMI 27.92 kg/m       Assessment/Plan:                Segundo was seen today for recheck medication.    Diagnoses and all orders for this visit:    Current moderate episode of major depressive disorder without prior episode (H)  -     sertraline (ZOLOFT) 25 MG tablet; Take 1 tablet (25 mg) by mouth daily for 10 days, THEN 2 tablets (50 mg) daily for 20 days.  -     Adult Mental [facility]  Referral; Future    Anxiety  -     sertraline (ZOLOFT) 25 MG tablet; Take 1 tablet (25 mg) by mouth daily for 10 days, THEN 2 tablets (50 mg) daily for 20 days.  -     [facility]    Acute pharyngitis, unspecified etiology  -     Streptococcus A Rapid Screen w/Reflex to PCR - Clinic Collect  -     Group A Streptococcus PCR Throat Swab  -     amoxicillin (AMOXIL) 875 MG tablet; Take 1 tablet (875 mg) by mouth 2 times daily.         DISCUSSION  See discussion below.  Follow-up with me in 3 to 4 weeks.  Notify me immediately if there are any further symptom concerns.  Subjective:     HPI:    Segundo Sellers is a 64 year old male is here today for follow-up.  He was seen last week for routine visit to discuss multiple concerns one of the main concerns we discussed was depression and anxiety symptoms.  He was on prednisone at the time for his back pain issues which may have been a contributing factor to some worsened anxiety.  We put him on duloxetine in the hope that this would help to better manage some chronic pain issues.  Patient reported side effect of urinary retention and increased anxiety were elected to stop the medication we spoke briefly the next day with phone.  Elected to follow-up today to repeat consider therapy options.  Today we talked about SSRI therapy we will utilize sertraline.  We talked about the benefits of cognitive " behavioral therapy and will make a referral.  Patient does report some worsened symptoms last week but have settled symptoms somewhat.  He is dealing with a sore throat as well.  We discussed evaluation of this he does not have other symptoms other than the sore throat.  Sore throat started last week after our last conversation.  Has been going on now for about 4 days.      We discussed other options for helping to ensure adequate mental health we discussed more specific resources for acute concerns.  Discussed suicidal ideation and how to cope with that should it recur.    With the sore throat we discussed strep test which ended up being negative and we discussed treatment options.    ROS:  Complete review of systems is obtained.  Other than the specific considerations noted above complete review of systems is negative.        4/15/2024    12:23 PM 10/7/2024     6:47 AM 4/6/2025     9:27 AM   PHQ   PHQ-9 Total Score 12 5 15    Q9: Thoughts of better off dead/self-harm past 2 weeks Not at all Not at all  Not at all       Patient-reported    Proxy-reported          Objective:   Medications:  Current Outpatient Medications   Medication Sig Dispense Refill    acetaminophen (TYLENOL) 325 MG tablet Take 2 tablets (650 mg) by mouth every 4 hours as needed for other (For optimal non-opioid multimodal pain management to improve pain control.) 100 tablet 0    albuterol (PROAIR HFA) 90 mcg/actuation inhaler Inhale 1-2 puffs into the lungs every 4 hours as needed      amoxicillin (AMOXIL) 875 MG tablet Take 1 tablet (875 mg) by mouth 2 times daily. 20 tablet 0    docusate sodium (COLACE) 100 MG capsule Take 1 capsule (100 mg) by mouth daily      fluticasone (FLONASE) 50 MCG/ACT nasal spray USE 2 SPRAYS IN EACH NOSTIL DAILY 16 g 4    gabapentin (NEURONTIN) 100 MG capsule Take 2 capsules (200 mg) by mouth 2 times daily 30 capsule 0    meloxicam (MOBIC) 15 MG tablet Take 15 mg by mouth daily.      methocarbamol (ROBAXIN) 750 MG  tablet Take 1 tablet (750 mg) by mouth every 6 hours 30 tablet 0    montelukast (SINGULAIR) 10 mg tablet Take 10 mg by mouth daily      oxyCODONE (ROXICODONE) 5 MG tablet Take 1 tablet (5 mg) by mouth every 4 hours as needed for severe pain. 150 tablet 0    senna-docusate (SENOKOT-S/PERICOLACE) 8.6-50 MG tablet Take 2 tablets by mouth 2 times daily 60 tablet 0    sertraline (ZOLOFT) 25 MG tablet Take 1 tablet (25 mg) by mouth daily for 10 days, THEN 2 tablets (50 mg) daily for 20 days. 50 tablet 0    sildenafil (VIAGRA) 100 MG tablet Take 1 tablet (100 mg) by mouth daily as needed. 30 tablet 1     No current facility-administered medications for this visit.        Allergies:   No Known Allergies     Social History     Socioeconomic History    Marital status:      Spouse name: Not on file    Number of children: Not on file    Years of education: Not on file    Highest education level: Not on file   Occupational History    Not on file   Tobacco Use    Smoking status: Never     Passive exposure: Never    Smokeless tobacco: Former     Types: Chew   Vaping Use    Vaping status: Never Used   Substance and Sexual Activity    Alcohol use: Yes     Alcohol/week: 2.0 standard drinks of alcohol     Types: 2 Standard drinks or equivalent per week     Comment: 4 per week    Drug use: No    Sexual activity: Yes     Partners: Female     Comment:    Other Topics Concern    Not on file   Social History Narrative    Not on file     Social Drivers of Health     Financial Resource Strain: Low Risk  (4/2/2025)    Financial Resource Strain     Within the past 12 months, have you or your family members you live with been unable to get utilities (heat, electricity) when it was really needed?: No   Food Insecurity: Low Risk  (4/2/2025)    Food Insecurity     Within the past 12 months, did you worry that your food would run out before you got money to buy more?: No     Within the past 12 months, did the food you bought just not  last and you didn t have money to get more?: No   Transportation Needs: Low Risk  (4/2/2025)    Transportation Needs     Within the past 12 months, has lack of transportation kept you from medical appointments, getting your medicines, non-medical meetings or appointments, work, or from getting things that you need?: No   Physical Activity: Sufficiently Active (4/2/2025)    Exercise Vital Sign     Days of Exercise per Week: 3 days     Minutes of Exercise per Session: 60 min   Stress: Stress Concern Present (4/2/2025)    Citizen of [place] [employer] Occup[employer] - Occupational Stress Questionnaire     Feeling of Stress : Rather much   Social Connections: Unknown (4/2/2025)    Social Connection and Isolation Panel [NHANES]     Frequency of Communication with Friends and Family: Not on file     Frequency of Social Gatherings with Friends and Family: Never     Attends Gnosticism Services: Not on file     Active Member of Clubs or Organizations: Not on file     Attends Club or Organization Meetings: Not on file     Marital Status: Not on file   Interpersonal Safety: Low Risk  (10/7/2024)    Interpersonal Safety     Do you feel physically and emotionally safe where you currently live?: Yes     Within the past 12 months, have you been hit, slapped, kicked or otherwise physically hurt by someone?: No     Within the past 12 months, have you been humiliated or emotionally abused in other ways by your partner or ex-partner?: No   Housing Stability: Low Risk  (4/2/2025)    Housing Stability     Do you have housing? : Yes     Are you worried about losing your housing?: No       Family History   Problem Relation Age of Onset    No Known Problems Mother     Hypertension Father         Most Recent Immunizations   Administered Date(s) Administered    COVID-19 12+ (Pfizer) 10/07/2024    COVID-19 MONOVALENT 12+ (Pfizer) 01/04/2022    COVID-19 Monovalent 12+ (Pfizer 2022) 04/13/2022    Flu, Unspecified 01/01/2022    Influenza (IIV3) PF  "12/17/2014    Influenza Vaccine 18-64 (Flublok) 09/28/2023    Influenza Vaccine >6 months,quad, PF 11/23/2020    Influenza Vaccine Trivalent (FluBlok) 10/07/2024    Pneumococcal 23 valent 03/02/2012    RSV (Abrysvo) 12/11/2023    TD,PF 7+ (Tenivac) 02/20/2020    TDAP (Adacel,Boostrix) 04/02/2019    Tdap (Adult) Unspecified Formulation 01/23/2009        Wt Readings from Last 3 Encounters:   04/15/25 96 kg (211 lb 9.6 oz)   04/07/25 96.8 kg (213 lb 6.4 oz)   10/07/24 97.8 kg (215 lb 11.2 oz)        BP Readings from Last 6 Encounters:   04/15/25 138/76   04/07/25 128/76   10/07/24 120/70   06/14/24 125/76   05/24/24 132/76   12/11/23 130/78        Hemoglobin A1C   Date Value Ref Range Status   04/07/2025 5.9 (H) 0.0 - 5.6 % Final     Comment:     Normal <5.7%   Prediabetes 5.7-6.4%    Diabetes 6.5% or higher     Note: Adopted from ADA consensus guidelines.   05/24/2024 5.7 (H) 0.0 - 5.6 % Final     Comment:     Normal <5.7%   Prediabetes 5.7-6.4%    Diabetes 6.5% or higher     Note: Adopted from ADA consensus guidelines.   03/03/2023 5.7 (H) 0.0 - 5.6 % Final     Comment:     Normal <5.7%   Prediabetes 5.7-6.4%    Diabetes 6.5% or higher     Note: Adopted from ADA consensus guidelines.        The 10-year ASCVD risk score (Benjamín DK, et al., 2019) is: 12.6%    Values used to calculate the score:      Age: 64 years      Sex: Male      Is Non- : No      Diabetic: No      Tobacco smoker: No      Systolic Blood Pressure: 138 mmHg      Is BP treated: No      HDL Cholesterol: 57 mg/dL      Total Cholesterol: 206 mg/dL        PHYSICAL EXAM:    /76   Pulse 66   Temp 98.3  F (36.8  C) (Oral)   Resp 22   Ht 1.854 m (6' 1\")   Wt 96 kg (211 lb 9.6 oz)   SpO2 99%   BMI 27.92 kg/m       General: Patient had no signs of distress    HEENT no lesions in the mouth and the posterior pharynx there is redness and a few white exudative type spots.  No sign of any abscess or other significant concerns.  No " palpable lymphadenopathy.                          Answers submitted by the patient for this visit:  Depression / Anxiety Questionnaire (Submitted on 4/12/2025)  Chief Complaint: Chronic problems general questions HPI Form  Depression/Anxiety: Depression & Anxiety  Depression & Anxiety (Submitted on 4/12/2025)  Chief Complaint: Chronic problems general questions HPI Form  Status since last visit:: worse  Anxiety since last: : worse  Other associated symptoms of depression:: Yes  Other associated symotome: : Yes  Significant life event: : No  Anxious:: Yes  Current substance use:: No  General Questionnaire (Submitted on 4/12/2025)  Chief Complaint: Chronic problems general questions HPI Form  How many servings of fruits and vegetables do you eat daily?: 2-3  On average, how many sweetened beverages do you drink each day (Examples: soda, juice, sweet tea, etc.  Do NOT count diet or artificially sweetened beverages)?: 1  How many minutes a day do you exercise enough to make your heart beat faster?: 10 to 19  How many days a week do you exercise enough to make your heart beat faster?: 3 or less  How many days per week do you miss taking your medication?: 0  Questionnaire about: Chronic problems general questions HPI Form (Submitted on 4/12/2025)  Chief Complaint: Chronic problems general questions HPI Form

## 2025-04-22 ENCOUNTER — TRANSFERRED RECORDS (OUTPATIENT)
Dept: HEALTH INFORMATION MANAGEMENT | Facility: CLINIC | Age: 65
End: 2025-04-22
Payer: COMMERCIAL

## 2025-04-29 ENCOUNTER — VIRTUAL VISIT (OUTPATIENT)
Dept: FAMILY MEDICINE | Facility: CLINIC | Age: 65
End: 2025-04-29
Payer: MEDICARE

## 2025-04-29 DIAGNOSIS — F32.1 CURRENT MODERATE EPISODE OF MAJOR DEPRESSIVE DISORDER WITHOUT PRIOR EPISODE (H): Primary | ICD-10-CM

## 2025-04-29 DIAGNOSIS — M48.061 SPINAL STENOSIS OF LUMBAR REGION WITHOUT NEUROGENIC CLAUDICATION: ICD-10-CM

## 2025-04-29 DIAGNOSIS — G89.29 OTHER CHRONIC PAIN: ICD-10-CM

## 2025-04-29 DIAGNOSIS — N40.1 BENIGN PROSTATIC HYPERPLASIA WITH URINARY FREQUENCY: ICD-10-CM

## 2025-04-29 DIAGNOSIS — R09.82 POST-NASAL DRIP: ICD-10-CM

## 2025-04-29 DIAGNOSIS — R07.0 THROAT PAIN: ICD-10-CM

## 2025-04-29 DIAGNOSIS — R35.0 BENIGN PROSTATIC HYPERPLASIA WITH URINARY FREQUENCY: ICD-10-CM

## 2025-04-29 DIAGNOSIS — G47.00 INSOMNIA, UNSPECIFIED TYPE: ICD-10-CM

## 2025-04-29 DIAGNOSIS — F41.9 ANXIETY: ICD-10-CM

## 2025-04-29 DIAGNOSIS — R06.89 GASPING FOR BREATH: ICD-10-CM

## 2025-04-29 PROCEDURE — 98006 SYNCH AUDIO-VIDEO EST MOD 30: CPT | Performed by: FAMILY MEDICINE

## 2025-04-29 RX ORDER — MIRTAZAPINE 7.5 MG/1
7.5 TABLET, FILM COATED ORAL AT BEDTIME
Qty: 30 TABLET | Refills: 2 | Status: SHIPPED | OUTPATIENT
Start: 2025-04-29

## 2025-04-29 RX ORDER — TAMSULOSIN HYDROCHLORIDE 0.4 MG/1
0.4 CAPSULE ORAL EVERY EVENING
COMMUNITY
Start: 2025-04-22

## 2025-04-29 NOTE — PROGRESS NOTES
Segundo is a 65 year old who is being evaluated via a billable video visit.    How would you like to obtain your AVS? MyChart  If the video visit is dropped, the invitation should be resent by: Text to cell phone: [phone]  Will anyone else be joining your video visit? No      Assessment & Plan     Segundo was seen today for recheck medication, sleep problem and mental health problem.    Diagnoses and all orders for this visit:    Current moderate episode of major depressive disorder without prior episode (H)  -     mirtazapine (REMERON) 7.5 MG tablet; Take 1 tablet (7.5 mg) by mouth at bedtime.    Anxiety  -     mirtazapine (REMERON) 7.5 MG tablet; Take 1 tablet (7.5 mg) by mouth at bedtime.    Insomnia, unspecified type  -     mirtazapine (REMERON) 7.5 MG tablet; Take 1 tablet (7.5 mg) by mouth at bedtime.    Throat pain    Spinal stenosis of lumbar region without neurogenic claudication    Gasping for breath    Other chronic pain    Post-nasal drip    Benign prostatic hyperplasia with urinary frequency         Start mirtazapine 7.5 mg believe this will help with sleep and anxiety management discussed again the dangers of side effects of any medication.    Referral to pain clinic to help with pain management, complexity exists regarding his acknowledgment that he is fixed on taking the medication not just to control pain but he feels it makes him feel better overall and recognizes this is a bad thing.  We discussed trying to find alternative ways of managing pain without having this type of situation.    He should proceed with sleep specialist evaluation as planned but I do not think there is anything emergent to add I believe he will have better sleep if he can help reduce postnasal drip in an appropriate fashion and I think fluticasone nasal spray would be the way to do this.  See discussion below.    Reevaluate throat at next visit see discussion below.    Reduce medications that would contribute to BPH  "which include oxycodone and antihistamines see discussion below.    Arrange for virtual visit in a week.  He already has an in person appointment scheduled for 3 weeks from now which we will plan to keep.    The duration of this visit was 50 minutes.        BMI  Estimated body mass index is 27.92 kg/m  as calculated from the following:    Height as of 4/15/25: 1.854 m (6' 1\").    Weight as of 4/15/25: 96 kg (211 lb 9.6 oz).             Becca Raymond is a 65 year old, presenting for the following health issues:  No chief complaint on file.      Video Start Time: 8:30 AM    HPI      The purpose of this visit is to follow-up on mental health concerns, sleep difficulty, management of chronic pain, concerns related to worsened BPH symptoms, postnasal drip.    He has significant anxiety, major depression, ongoing grief and difficulty with sleep.    We tried duloxetine led to worsened urinary obstruction that was discontinued after just a few days.  Tried sertraline in place of the duloxetine which made him feel off.  Tried the medication for a total of approximately 6 days.    Reports ongoing anxiety symptoms.  He is depressed, becomes tearful during the course of our visit, describes hopelessness.    Historically and currently has been focused on finding a quick fix for symptoms.  Discussed extensively process of trying to improve situations through appropriate treatment pathways.  In the past had been on ADHD medications and zolpidem for low energy/ADHD symptoms and insomnia.  We have moved away from these medications which is good.  He is on oxycodone to manage significant chronic pain.  He does recognize that these medications cause side effects.  He also recognizes that the medication tends to make him feel better overall in more ways and just controlling pain.  We discussed referral to a pain clinic to help address his pain control needs further as this adds a significant layer of complexity.  We discussed " maintaining consistency at 5 tablets/day and reducing if able.    He is waking gasping he is convinced he has sleep apnea.  He does have a sleep specialist appointment coming up in just a couple of weeks.  Discussed with him that I think postnasal drip is probably making things worse I do think anxiety is the biggest contributing factor to the symptoms.  He inquires about getting a stress test there is nothing that he describes that would indicate that he has an anginal equivalent that is leading to these kind of symptoms, he does exercise regularly and is symptom-free.  We talked about mirtazapine and the benefits of this medication in terms of helping with sleep and anxiety management.    He is taking Allegra which is likely on top of his oxycodone and his severe BPH is not helpful.  We discussed moving away from Allegra and utilizing fluticasone nasal spray to help with postnasal drip.  Discussed that this again would be a process of having the fluticasone nasal spray help with symptoms.  Discussed how the Allegra may be a contributing factor to BPH.  Acknowledge that his use of oxycodone is a contributing factor to BPH symptoms.    He is concerned about a white spot in his throat, at his last visit we discussed sore throat I evaluated did not notice significant concerns strep test was negative.  We discussed reevaluating and an in person visit in the near future but I do not think he needs any emergent type of evaluation or treatment for this concern.    He is seeing a therapist tomorrow.  We discussed the process of therapy.      Complete review of systems is obtained.  Other than the specific considerations noted above complete review of systems is negative.        Objective           Vitals:  No vitals were obtained today due to virtual visit.    Physical Exam   GENERAL: alert and no distress  EYES: Eyes grossly normal to inspection.  No discharge or erythema, or obvious scleral/conjunctival  abnormalities.  RESP: No audible wheeze, cough, or visible cyanosis.    SKIN: Visible skin clear. No significant rash, abnormal pigmentation or lesions.  NEURO: Cranial nerves grossly intact.  Mentation and speech appropriate for age.  PSYCH: Appropriate affect, tone, and pace of words          Video-Visit Details    Type of service:  Video Visit   Video End Time:9:06 AM  Originating Location (pt. Location): Home    Distant Location (provider location):  On-site  Platform used for Video Visit: Daniel  Signed Electronically by: Segundo Dejesus MD, MD

## 2025-04-30 ENCOUNTER — PATIENT OUTREACH (OUTPATIENT)
Dept: CARE COORDINATION | Facility: CLINIC | Age: 65
End: 2025-04-30
Payer: COMMERCIAL

## 2025-05-05 ENCOUNTER — VIRTUAL VISIT (OUTPATIENT)
Dept: FAMILY MEDICINE | Facility: CLINIC | Age: 65
End: 2025-05-05
Payer: MEDICARE

## 2025-05-05 DIAGNOSIS — G47.00 INSOMNIA, UNSPECIFIED TYPE: ICD-10-CM

## 2025-05-05 DIAGNOSIS — R09.82 POST-NASAL DRIP: ICD-10-CM

## 2025-05-05 DIAGNOSIS — R07.0 THROAT PAIN: ICD-10-CM

## 2025-05-05 DIAGNOSIS — M48.061 SPINAL STENOSIS OF LUMBAR REGION WITHOUT NEUROGENIC CLAUDICATION: ICD-10-CM

## 2025-05-05 DIAGNOSIS — F32.1 CURRENT MODERATE EPISODE OF MAJOR DEPRESSIVE DISORDER WITHOUT PRIOR EPISODE (H): Primary | ICD-10-CM

## 2025-05-05 DIAGNOSIS — R35.0 BENIGN PROSTATIC HYPERPLASIA WITH URINARY FREQUENCY: ICD-10-CM

## 2025-05-05 DIAGNOSIS — R06.89 GASPING FOR BREATH: ICD-10-CM

## 2025-05-05 DIAGNOSIS — G89.29 OTHER CHRONIC PAIN: ICD-10-CM

## 2025-05-05 DIAGNOSIS — N40.1 BENIGN PROSTATIC HYPERPLASIA WITH URINARY FREQUENCY: ICD-10-CM

## 2025-05-05 DIAGNOSIS — F41.9 ANXIETY: ICD-10-CM

## 2025-05-05 PROBLEM — D12.5 BENIGN NEOPLASM OF SIGMOID COLON: Status: ACTIVE | Noted: 2020-03-31

## 2025-05-05 PROBLEM — K57.30 DIVERTICULAR DISEASE OF LARGE INTESTINE: Status: ACTIVE | Noted: 2020-03-27

## 2025-05-05 PROBLEM — K63.5 POLYP OF COLON: Status: ACTIVE | Noted: 2020-03-27

## 2025-05-05 PROCEDURE — 98005 SYNCH AUDIO-VIDEO EST LOW 20: CPT | Performed by: FAMILY MEDICINE

## 2025-05-05 RX ORDER — OXYCODONE HYDROCHLORIDE 5 MG/1
5 TABLET ORAL EVERY 4 HOURS PRN
Qty: 150 TABLET | Refills: 0 | Status: SHIPPED | OUTPATIENT
Start: 2025-05-05

## 2025-05-05 NOTE — PROGRESS NOTES
"Segundo is a 65 year old who is being evaluated via a billable video visit.    How would you like to obtain your AVS? MyChart  If the video visit is dropped, the invitation should be resent by: Text to cell phone: [phone]  Will anyone else be joining your video visit? No      Assessment & Plan     Segundo was seen today for recheck medication and insomnia.    Diagnoses and all orders for this visit:    Current moderate episode of major depressive disorder without prior episode (H)    Other chronic pain  -     oxyCODONE (ROXICODONE) 5 MG tablet; Take 1 tablet (5 mg) by mouth every 4 hours as needed for severe pain.    Spinal stenosis of lumbar region without neurogenic claudication  -     oxyCODONE (ROXICODONE) 5 MG tablet; Take 1 tablet (5 mg) by mouth every 4 hours as needed for severe pain.    Anxiety    Insomnia, unspecified type    Throat pain    Gasping for breath    Post-nasal drip    Benign prostatic hyperplasia with urinary frequency         BMI  Estimated body mass index is 27.92 kg/m  as calculated from the following:    Height as of 4/15/25: 1.854 m (6' 1\").    Weight as of 4/15/25: 96 kg (211 lb 9.6 oz).         Subjective   Segundo is a 65 year old, presenting for the following health issues:  Recheck Medication      Video Start Time: 8:40 AM    HPI      He has chronic back pain stemming from spinal stenosis with her history of prior surgical intervention.  He takes oxycodone 5 tablets/day total.  He has been referred to the pain clinic.  He is strongly motivated to taper off of medication but is still dealing with pain issues, he is going to start physical therapy and he has an appointment at the pain clinic in less than 2 weeks.  We discussed consistent continued use of the medication.  No evidence of misuse or diversion of the medication.  Previous visit did note that he felt better mentally taking the medication compared to not, some of this may be related to him inappropriately trying to taper " the medication and leading to withdrawal symptoms but there is some concern.  Will seek the help of the pain clinic in further managing this complex pain issue.    From a mental health standpoint he has depression symptoms, ongoing grief from the loss of his wife 2 years ago, increasing anxiety especially regarding health care.  Difficulty with sleep.  Retry duloxetine that led to urinary retention.  Sertraline made him feel off.  We tried mirtazapine which she feels has been beneficial for helping with sleep.  He notes no side effect concerns.  He is established with a therapist since I saw him last week he felt this was beneficial.  He is more optimistic overall.    He reports since he is sleeping better he is not woken gasping for breath.  He has stopped using Allegra and is using fluticasone nasal spray.  He notes less postnasal drip.  He notes no change in his obstructive urinary symptoms.  He does have follow-up scheduled with urology to discuss procedural approach to symptom management.  No acute changes.    He has a spot in his throat.  There was no abnormality in his last in person visit that I saw and a strep test was negative.  He reports no pain but still feels like he can see a spot in the back of his throat.  He is returning to see me for an in person visit in less than 2 weeks and we will evaluate further at that time.      Complete review of systems is obtained.  Other than the specific considerations noted above complete review of systems is negative.    .mftel      Objective           Vitals:  No vitals were obtained today due to virtual visit.    Physical Exam   GENERAL: alert and no distress  EYES: Eyes grossly normal to inspection.  No discharge or erythema, or obvious scleral/conjunctival abnormalities.  RESP: No audible wheeze, cough, or visible cyanosis.    SKIN: Visible skin clear. No significant rash, abnormal pigmentation or lesions.  NEURO: Cranial nerves grossly intact.  Mentation and  speech appropriate for age.  PSYCH: Appropriate affect, tone, and pace of words          Video-Visit Details    Type of service:  Video Visit   Video End Time:9:00 AM  Originating Location (pt. Location): Home    Distant Location (provider location):  On-site  Platform used for Video Visit: Daniel  Signed Electronically by: Segundo Dejesus MD

## 2025-05-11 ASSESSMENT — ANXIETY QUESTIONNAIRES
1. FEELING NERVOUS, ANXIOUS, OR ON EDGE: NEARLY EVERY DAY
2. NOT BEING ABLE TO STOP OR CONTROL WORRYING: NEARLY EVERY DAY
GAD7 TOTAL SCORE: 14
8. IF YOU CHECKED OFF ANY PROBLEMS, HOW DIFFICULT HAVE THESE MADE IT FOR YOU TO DO YOUR WORK, TAKE CARE OF THINGS AT HOME, OR GET ALONG WITH OTHER PEOPLE?: NOT DIFFICULT AT ALL
GAD7 TOTAL SCORE: 14
6. BECOMING EASILY ANNOYED OR IRRITABLE: MORE THAN HALF THE DAYS
7. FEELING AFRAID AS IF SOMETHING AWFUL MIGHT HAPPEN: SEVERAL DAYS
7. FEELING AFRAID AS IF SOMETHING AWFUL MIGHT HAPPEN: SEVERAL DAYS
4. TROUBLE RELAXING: MORE THAN HALF THE DAYS
3. WORRYING TOO MUCH ABOUT DIFFERENT THINGS: NEARLY EVERY DAY
GAD7 TOTAL SCORE: 14
5. BEING SO RESTLESS THAT IT IS HARD TO SIT STILL: NOT AT ALL
IF YOU CHECKED OFF ANY PROBLEMS ON THIS QUESTIONNAIRE, HOW DIFFICULT HAVE THESE PROBLEMS MADE IT FOR YOU TO DO YOUR WORK, TAKE CARE OF THINGS AT HOME, OR GET ALONG WITH OTHER PEOPLE: NOT DIFFICULT AT ALL

## 2025-05-11 ASSESSMENT — PAIN SCALES - PAIN ENJOYMENT GENERAL ACTIVITY SCALE (PEG)
PEG_TOTALSCORE: 7
AVG_PAIN_PASTWEEK: 5
INTERFERED_ENJOYMENT_LIFE: 8
INTERFERED_GENERAL_ACTIVITY: 8
PEG_TOTALSCORE: 7
AVG_PAIN_PASTWEEK: 5
INTERFERED_GENERAL_ACTIVITY: 8
INTERFERED_ENJOYMENT_LIFE: 8

## 2025-05-11 ASSESSMENT — SLEEP AND FATIGUE QUESTIONNAIRES
HOW LIKELY ARE YOU TO NOD OFF OR FALL ASLEEP WHILE SITTING AND TALKING TO SOMEONE: SLIGHT CHANCE OF DOZING
HOW LIKELY ARE YOU TO NOD OFF OR FALL ASLEEP WHILE LYING DOWN TO REST IN THE AFTERNOON WHEN CIRCUMSTANCES PERMIT: SLIGHT CHANCE OF DOZING
HOW LIKELY ARE YOU TO NOD OFF OR FALL ASLEEP WHILE SITTING QUIETLY AFTER LUNCH WITHOUT ALCOHOL: SLIGHT CHANCE OF DOZING
HOW LIKELY ARE YOU TO NOD OFF OR FALL ASLEEP WHILE WATCHING TV: MODERATE CHANCE OF DOZING
HOW LIKELY ARE YOU TO NOD OFF OR FALL ASLEEP WHILE SITTING AND READING: MODERATE CHANCE OF DOZING
HOW LIKELY ARE YOU TO NOD OFF OR FALL ASLEEP WHEN YOU ARE A PASSENGER IN A CAR FOR AN HOUR WITHOUT A BREAK: MODERATE CHANCE OF DOZING
HOW LIKELY ARE YOU TO NOD OFF OR FALL ASLEEP WHILE SITTING INACTIVE IN A PUBLIC PLACE: MODERATE CHANCE OF DOZING
HOW LIKELY ARE YOU TO NOD OFF OR FALL ASLEEP IN A CAR, WHILE STOPPED FOR A FEW MINUTES IN TRAFFIC: SLIGHT CHANCE OF DOZING

## 2025-05-14 ENCOUNTER — OFFICE VISIT (OUTPATIENT)
Dept: PALLIATIVE MEDICINE | Facility: OTHER | Age: 65
End: 2025-05-14
Attending: FAMILY MEDICINE
Payer: MEDICARE

## 2025-05-14 ENCOUNTER — OFFICE VISIT (OUTPATIENT)
Dept: SLEEP MEDICINE | Facility: CLINIC | Age: 65
End: 2025-05-14
Payer: MEDICARE

## 2025-05-14 VITALS
BODY MASS INDEX: 28.3 KG/M2 | HEIGHT: 73 IN | SYSTOLIC BLOOD PRESSURE: 136 MMHG | WEIGHT: 213.5 LBS | OXYGEN SATURATION: 97 % | DIASTOLIC BLOOD PRESSURE: 80 MMHG | HEART RATE: 63 BPM

## 2025-05-14 VITALS — HEART RATE: 61 BPM | OXYGEN SATURATION: 98 % | DIASTOLIC BLOOD PRESSURE: 83 MMHG | SYSTOLIC BLOOD PRESSURE: 140 MMHG

## 2025-05-14 DIAGNOSIS — R06.89 GASPING FOR BREATH: ICD-10-CM

## 2025-05-14 DIAGNOSIS — E55.9 VITAMIN D DEFICIENCY, UNSPECIFIED: ICD-10-CM

## 2025-05-14 DIAGNOSIS — G47.33 OSA (OBSTRUCTIVE SLEEP APNEA): Primary | ICD-10-CM

## 2025-05-14 DIAGNOSIS — R53.83 OTHER FATIGUE: ICD-10-CM

## 2025-05-14 DIAGNOSIS — G89.29 OTHER CHRONIC PAIN: ICD-10-CM

## 2025-05-14 DIAGNOSIS — M48.061 SPINAL STENOSIS OF LUMBAR REGION WITHOUT NEUROGENIC CLAUDICATION: ICD-10-CM

## 2025-05-14 PROCEDURE — 3079F DIAST BP 80-89 MM HG: CPT | Performed by: INTERNAL MEDICINE

## 2025-05-14 PROCEDURE — 3077F SYST BP >= 140 MM HG: CPT | Performed by: ANESTHESIOLOGY

## 2025-05-14 PROCEDURE — G2211 COMPLEX E/M VISIT ADD ON: HCPCS | Performed by: ANESTHESIOLOGY

## 2025-05-14 PROCEDURE — 1125F AMNT PAIN NOTED PAIN PRSNT: CPT | Performed by: ANESTHESIOLOGY

## 2025-05-14 PROCEDURE — 99204 OFFICE O/P NEW MOD 45 MIN: CPT | Performed by: INTERNAL MEDICINE

## 2025-05-14 PROCEDURE — 3075F SYST BP GE 130 - 139MM HG: CPT | Performed by: INTERNAL MEDICINE

## 2025-05-14 PROCEDURE — 99205 OFFICE O/P NEW HI 60 MIN: CPT | Performed by: ANESTHESIOLOGY

## 2025-05-14 PROCEDURE — G0463 HOSPITAL OUTPT CLINIC VISIT: HCPCS | Performed by: ANESTHESIOLOGY

## 2025-05-14 PROCEDURE — 3079F DIAST BP 80-89 MM HG: CPT | Performed by: ANESTHESIOLOGY

## 2025-05-14 PROCEDURE — 1125F AMNT PAIN NOTED PAIN PRSNT: CPT | Performed by: INTERNAL MEDICINE

## 2025-05-14 RX ORDER — BUPROPION HYDROCHLORIDE 150 MG/1
150 TABLET ORAL EVERY MORNING
Qty: 30 TABLET | Refills: 4 | Status: SHIPPED | OUTPATIENT
Start: 2025-05-14

## 2025-05-14 RX ORDER — FLUOCINONIDE TOPICAL SOLUTION USP, 0.05% 0.5 MG/ML
SOLUTION TOPICAL
COMMUNITY
Start: 2025-05-05

## 2025-05-14 RX ORDER — HYDROXYZINE HYDROCHLORIDE 25 MG/1
25 TABLET, FILM COATED ORAL 3 TIMES DAILY PRN
Qty: 30 TABLET | Refills: 3 | Status: SHIPPED | OUTPATIENT
Start: 2025-05-14

## 2025-05-14 RX ORDER — CLONIDINE HYDROCHLORIDE 0.1 MG/1
0.1 TABLET ORAL EVERY 6 HOURS PRN
Qty: 30 TABLET | Refills: 2 | Status: SHIPPED | OUTPATIENT
Start: 2025-05-14 | End: 2025-05-28

## 2025-05-14 ASSESSMENT — PAIN SCALES - GENERAL: PAINLEVEL_OUTOF10: MILD PAIN (3)

## 2025-05-14 NOTE — PATIENT INSTRUCTIONS
"          MY TREATMENT INFORMATION FOR SLEEP APNEA-  Segundo Sellers    DOCTOR : KETAN HANEY MD    Am I having a sleep study at a sleep center?  --->Due to normal delays, you will be contacted within 2-4 weeks to schedule    Am I having a home sleep study?  --->Watch the video for the device you are using:    -/drop off device-   https://www.NewAer.com/watch?v=yGGFBdELGhk        Frequently asked questions:  1. What is Obstructive Sleep Apnea (ANDRE)? ANDRE is the most common type of sleep apnea. Apnea means, \"without breath.\"  Apnea is most often caused by narrowing or collapse of the upper airway as muscles relax during sleep.   Almost everyone has occasional apneas. Most people with sleep apnea have had brief interruptions at night frequently for many years.  The severity of sleep apnea is related to how frequent and severe the events are.   2. What are the consequences of ANDRE? Symptoms include: feeling sleepy during the day, snoring loudly, gasping or stopping of breathing, trouble sleeping, and occasionally morning headaches or heartburn at night.  Sleepiness can be serious and even increase the risk of falling asleep while driving. Other health consequences may include development of high blood pressure and other cardiovascular disease in persons who are susceptible. Untreated ANDRE  can contribute to heart disease, stroke and diabetes.   3. What are the treatment options? In most situations, sleep apnea is a lifelong disease that must be managed with daily therapy. Medications are not effective for sleep apnea and surgery is generally not considered until other therapies have been tried. Your treatment is your choice . Continuous Positive Airway (CPAP) works right away and is the therapy that is effective in nearly everyone. An oral device to hold your jaw forward is usually the next most reliable option. Other options include postioning devices (to keep you off your back), weight loss, and surgery " including a tongue pacing device. There is more detail about some of these options below.  4. Are my sleep studies covered by insurance? Although we will request verification of coverage, we advise you also check in advance of the study to ensure there is coverage.    Important tips for those choosing CPAP and similar devices  REMEMBER-IF YOU RECEIVE A CALL FROM  [phone]-->IT IS TO SETUP A DEVICE  For new devices, sign up for device ROE to monitor your device for your followup visits  We encourage you to utilize the [employer] roe or website ( https://YouTab/ ) to monitor your therapy progress and share the data with your healthcare team when you discuss your sleep apnea.                                                    Know your equipment:  CPAP is continuous positive airway pressure that prevents obstructive sleep apnea by keeping the throat from collapsing while you are sleeping. In most cases, the device is  smart  and can slowly self-adjusts if your throat collapses and keeps a record every day of how well you are treated-this information is available to you and your care team.  BPAP is bilevel positive airway pressure that keeps your throat open and also assists each breath with a pressure boost to maintain adequate breathing.  Special kinds of BPAP are used in patients who have inadequate breathing from lung or heart disease. In most cases, the device is  smart  and can slowly self-adjusts to assist breathing. Like CPAP, the device keeps a record of how well you are treated.  Your mask is your connection to the device. You get to choose what feels most comfortable and the staff will help to make sure if fits. Here: are some examples of the different masks that are available: Magnetic mask aids may assist with use but there are safety issues that should be addressed when considering with magnets* ( see end of discussion).       Key points to remember on your journey with sleep  apnea:  Sleep study.  PAP devices often need to be adjusted during a sleep study to show that they are effective and adjusted right.  Good tips to remember: Try wearing just the mask during a quiet time during the day so your body adapts to wearing it. A humidifier is recommended for comfort in most cases to prevent drying of your nose and throat. Allergy medication from your provider may help you if you are having nasal congestion.  Getting settled-in. It takes more than one night for most of us to get used to wearing a mask. Try wearing just the mask during a quiet time during the day so your body adapts to wearing it. A humidifier is recommended for comfort in most cases. Our team will work with you carefully on the first day and will be in contact within 4 days and again at 2 and 4 weeks for advice and remote device adjustments. Your therapy is evaluated by the device each day.   Use it every night. The more you are able to sleep naturally for 7-8 hours, the more likely you will have good sleep and to prevent health risks or symptoms from sleep apnea. Even if you use it 4 hours it helps. Occasionally all of us are unable to use a medical therapy, in sleep apnea, it is not dangerous to miss one night.   Communicate. Call our skilled team on the number provided on the first day if your visit for problems that make it difficult to wear the device. Over 2 out of 3 patients can learn to wear the device long-term with help from our team. Remember to call our team or your sleep providers if you are unable to wear the device as we may have other solutions for those who cannot adapt to mask CPAP therapy. It is recommended that you sleep your sleep provider within the first 3 months and yearly after that if you are not having problems.   Use it for your health. We encourage use of CPAP masks during daytime quiet periods to allow your face and brain to adapt to the sensation of CPAP so that it will be a more natural  sensation to awaken to at night or during naps. This can be very useful during the first few weeks or months of adapting to CPAP though it does not help medically to wear CPAP during wakefulness and  should not be used as a strategy just to meet guidelines.  Take care of your equipment. Make sure you clean your mask and tubing using directions every day and that your filter and mask are replaced as recommended or if they are not working.     *Masks with magnets:  Updated Contraindications  Masks with magnetic components are contraindicated for use by patients where they, or anyone in close physical contact while using the mask, have the following:   Active medical implants that interact with magnets (i.e., pacemakers, implantable cardioverter defibrillators (ICD), neurostimulators, cerebrospinal fluid (CSF) shunts, insulin/infusion pumps)   Metallic implants/objects containing ferromagnetic material (i.e., aneurysm clips/flow disruption devices, embolic coils, stents, valves, electrodes, implants to restore hearing or balance with implanted magnets, ocular implants, metallic splinters in the eye)  Updated Warning  Keep the mask magnets at a safe distance of at least 6 inches (150 mm) away from implants or medical devices that may be adversely affected by magnetic interference. This warning applies to you or anyone in close physical contact with your mask. The magnets are in the frame and lower headgear clips, with a magnetic field strength of up to 400mT. When worn, they connect to secure the mask but may inadvertently detach while asleep.  Implants/medical devices, including those listed within contraindications, may be adversely affected if they change function under external magnetic fields or contain ferromagnetic materials that attract/repel to magnetic fields (some metallic implants, e.g., contact lenses with metal, dental implants, metallic cranial plates, screws, joesph hole covers, and bone substitute  devices). Consult your physician and  of your implant / other medical device for information on the potential adverse effects of magnetic fields.    BESIDES CPAP, WHAT OTHER THERAPIES ARE THERE?    Positioning Device  Positioning devices are generally used when sleep apnea is mild and only occurs on your back.This example shows a pillow that straps around the waist. It may be appropriate for those whose sleep study shows milder sleep apnea that occurs primarily when lying flat on one's back. Preliminary studies have shown benefit but effectiveness at home may need to be verified by a home sleep test. These devices are generally not covered by medical insurance.  Examples of devices that maintain sleeping on the back to prevent snoring and mild sleep apnea.    Belt type body positioner  http://ShopSavvy/    Electronic reminder  http://nightshifttherapy.com/            Oral Appliance  What is oral appliance therapy?  An oral appliance device fits on your teeth at night like a retainer used after having braces. The device is made by a specialized dentist and requires several visits over 1-2 months before a manufactured device is made to fit your teeth and is adjusted to prevent your sleep apnea. Once an oral device is working properly, snoring should be improved. A home sleep test may be recommended at that time if to determine whether the sleep apnea is adequately treated.       Some things to remember:  -Oral devices are often, but not always, covered by your medical insurance. Be sure to check with your insurance provider.   -If you are referred for oral therapy, you will be given a list of specialized dentists to consider or you may choose to visit the Web site of the American Academy of Dental Sleep Medicine  -Oral devices are less likely to work if you have severe sleep apnea or are extremely overweight.     More detailed information  An oral appliance is a small acrylic device that fits over the  upper and lower teeth  (similar to a retainer or a mouth guard). This device slightly moves jaw forward, which moves the base of the tongue forward, opens the airway, improves breathing for effective treat snoring and obstructive sleep apnea in perhaps 7 out of 10 people .  The best working devices are custom-made by a dental device  after a mold is made of the teeth 1, 2, 3.  When is an oral appliance indicated?  Oral appliance therapy is recommended as a first-line treatment for patients with primary snoring, mild sleep apnea, and for patients with moderate sleep apnea who prefer appliance therapy to use of CPAP4, 5. Severity of sleep apnea is determined by sleep testing and is based on the number of respiratory events per hour of sleep.   How successful is oral appliance therapy?  The success rate of oral appliance therapy in patients with mild sleep apnea is 75-80% while in patients with moderate sleep apnea it is 50-70%. The chance of success in patients with severe sleep apnea is 40-50%. The research also shows that oral appliances have a beneficial effect on the cardiovascular health of ANDRE patients at the same magnitude as CPAP therapy7.  Oral appliances should be a second-line treatment in cases of severe sleep apnea, but if not completely successful then a combination therapy utilizing CPAP plus oral appliance therapy may be effective. Oral appliances tend to be effective in a broad range of patients although studies show that the patients who have the highest success are females, younger patients, those with milder disease, and less severe obesity. 3, 6.   Finding a dentist that practices dental sleep medicine  Specific training is available through the American Academy of Dental Sleep Medicine for dentists interested in working in the field of sleep. To find a dentist who is educated in the field of sleep and the use of oral appliances, near you, visit the Web site of the American Academy of  Dental Sleep Medicine.    References  1. Dolly et al. Objectively measured vs self-reported compliance during oral appliance therapy for sleep-disordered breathing. Chest 2013; 144(5): 2161-9052.  2. Mian et al. Objective measurement of compliance during oral appliance therapy for sleep-disordered breathing. Thorax 2013; 68(1): 91-96.  3. Nahid, et al. Mandibular advancement devices in 620 men and women with ANDRE and snoring: tolerability and predictors of treatment success. Chest 2004; 125: 9757-7785.  4. Lokesh et al. Oral appliances for snoring and ANDRE: a review. Sleep 2006; 29: 244-262.  5. Benjamin et al. Oral appliance treatment for ANDRE: an update. J Clin Sleep Med 2014; 10(2): 215-227.  6. Kait et al. Predictors of OSAH treatment outcome. J Dent Res 2007; 86: 7864-8090.      Weight Loss:   Your Body mass index is 28.17 kg/m .    Being overweight does not necessarily mean you will have health consequences.  Those who have BMI over 30 or over 27 with existing medical conditions carries greater risk.   Weight loss decreases severity of sleep apnea in most people with obesity. For those with mild obesity who have developed snoring with weight gain, even 15-30 pound weight loss can improve and occasionally milder eliminate sleep apnea.  Structured and life-long dietary and health habits are necessary to lose weight and keep healthier weight levels.     The Comprehensive Weight loss program offers all aspects of weight loss strategies including two Non-Surgical Weight Loss Programs: Medical Weight Management and our 24 Week Healthy Lifestyle Program:  Medical Weight Management: You will meet with a Medical Weight Management Provider, as well as a Registered Dietician. The program may include medication therapy, dietary education, recommended exercise and physical therapy programs, monthly support group meetings, and possible psychological counseling. Follow up visits with the provider or  dietician are scheduled based on your progress and needs.  24 Week Healthy Lifestyle Program: This unique program is designed to give you the support of weekly appointments and activities thru a 24-week period. It may include all of the components of the basic program (above), with the addition of 11 individual Health  Visits, 24-week access to the [employer] website for over 700 online classes, and monthly support group meetings. This program has an out-of-pocket expense of $499 to cover the items that can not be billed to insurance (health coaches and [employer] access), and is non-refundable/non-transferable (you may be able to use a Health Savings Account; ask your HSA provider). There may be an optional meal replacement plan prescribed as well.   Medication therapy has been approved for the treatment of sleep apnea: The FDA approved tirzepatide (ZEPBOUND) for moderate to severe sleep apnea (apnea-hypopnea index greater than or equal to 15) in patients with BMI of greater than or equal to 30, or BMI greater than or equal to 27 with at least 1 weight-related condition such as hypertension or dyslipidemia.  Surgical management achieves meaningful long-term weight loss and improvement in health risks in most patients with more severe obesity.      Sleep Apnea Surgery:    Surgery for obstructive sleep apnea is considered generally only when other therapies fail to work. Surgery may be discussed with you if you are having a difficult time tolerating CPAP and or when there is an abnormal structure that requires surgical correction.  Nose and throat surgeries often enlarge the airway to prevent collapse.  Most of these surgeries create pain for 1-2 weeks and up to half of the most common surgeries are not effective throughout life.  You should carefully discuss the benefits and drawbacks to surgery with your sleep provider and surgeon to determine if it is the best solution for you.   More information  Surgery for  ANDRE is directed at areas that are responsible for narrowing or complete obstruction of the airway during sleep.  There are a wide range of procedures available to enlarge and/or stabilize the airway to prevent blockage of breathing in the three major areas where it can occur: the palate, tongue, and nasal regions.  Successful surgical treatment depends on the accurate identification of the factors responsible for obstructive sleep apnea in each person.  A personalized approach is required because there is no single treatment that works well for everyone.  Because of anatomic variation, consultation with an examination by a sleep surgeon is a critical first step in determining what surgical options are best for each patient.  In some cases, examination during sedation may be recommended in order to guide the selection of procedures.  Patients will be counseled about risks and benefits as well as the typical recovery course after surgery. Surgery is typically not a cure for a person s ANDRE.  However, surgery will often significantly improve one s ANDRE severity (termed  success rate ).  Even in the absence of a cure, surgery will decrease the cardiovascular risk associated with OSA7; improve overall quality of life8 (sleepiness, functionality, sleep quality, etc).      Palate Procedures:  Patients with ANDRE often have narrowing of their airway in the region of their tonsils and uvula.  The goals of palate procedures are to widen the airway in this region as well as to help the tissues resist collapse.  Modern palate procedure techniques focus on tissue conservation and soft tissue rearrangement, rather than tissue removal.  Often the uvula is preserved in this procedure. Residual sleep apnea is common in patient after pharyngoplasty with an average reduction in sleep apnea events of 33%2.      Tongue Procedures:  ExamWhile patients are awake, the muscles that surround the throat are active and keep this region open for  breathing. These muscles relax during sleep, allowing the tongue and other structures to collapse and block breathing.  There are several different tongue procedures available.  Selection of a tongue base procedure depends on characteristics seen on physical exam.  Generally, procedures are aimed at removing bulky tissues in this area or preventing the back of the tongue from falling back during sleep.  Success rates for tongue surgery range from 50-62%3.    Hypoglossal Nerve Stimulation:  Hypoglossal nerve stimulation has recently received approval from the [place] Food and Drug Administration for the treatment of obstructive sleep apnea.  This is based on research showing that the system was safe and effective in treating sleep apnea6.  Results showed that the median AHI score decreased 68%, from 29.3 to 9.0. This therapy uses an implant system that senses breathing patterns and delivers mild stimulation to airway muscles, which keeps the airway open during sleep.  The system consists of three fully implanted components: a small generator (similar in size to a pacemaker), a breathing sensor, and a stimulation lead.  Using a small handheld remote, a patient turns the therapy on before bed and off upon awakening.    Candidates for this device must be greater than 18 years of age, have moderate to severe obstructive sleep apnea with less than 25% central events  (AHI between 15-65), BMI less than 35, have tried CPAP/oral appliance for at least 8 weeks without success, and have appropriate upper airway anatomy (determined by a sleep endoscopy performed by Dr. Manfred Baig or Dr. Daniel Zafar).     Nasal Procedures:  Nasal obstruction can interfere with nasal breathing during the day and night.  Studies have shown that relief of nasal obstruction can improve the ability of some patients to tolerate positive airway pressure therapy for obstructive sleep apnea1.  Treatment options include medications such as nasal  saline, topical corticosteroid and antihistamine sprays, and oral medications such as antihistamines or decongestants. Non-surgical treatments can include external nasal dilators for selected patients. If these are not successful by themselves, surgery can improve the nasal airway either alone or in combination with these other options.        Combination Procedures:  Combination of surgical procedures and other treatments may be recommended, particularly if patients have more than one area of narrowing or persistent positional disease.  The success rate of combination surgery ranges from 66-80%2,3.    References  Shyann CHRIS. The Role of the Nose in Snoring and Obstructive Sleep Apnoea: An Update.  Eur Arch Otorhinolaryngol. 2011; 268: 1365-73.   Qian SM; Devon JA; Robert JR; Pallanch JF; Beatrice MB; Michelle SG; Charissa SORIANO. Surgical modifications of the upper airway for obstructive sleep apnea in adults: a systematic review and meta-analysis. SLEEP 2010;33(10):0943-4080. Brianne JAMES. Hypopharyngeal surgery in obstructive sleep apnea: an evidence-based medicine review.  Arch Otolaryngol Head Neck Surg. 2006 Feb;132(2):206-13.  Skip YH1, Lauren Y, Marcin GINETTE. The efficacy of anatomically based multilevel surgery for obstructive sleep apnea. Otolaryngol Head Neck Surg. 2003 Oct;129(4):327-35.  Kezirian E, Goldberg A. Hypopharyngeal Surgery in Obstructive Sleep Apnea: An Evidence-Based Medicine Review. Arch Otolaryngol Head Neck Surg. 2006 Feb;132(2):206-13.  Ct RUSHING et al. Upper-Airway Stimulation for Obstructive Sleep Apnea.  N Engl J Med. 2014 Jan 9;370(2):139-49.  Pekristina Y et al. Increased Incidence of Cardiovascular Disease in Middle-aged Men with Obstructive Sleep Apnea. Am J Respir Crit Care Med; 2002 166: 159-165  Venu STARKS et al. Studying Life Effects and Effectiveness of Palatopharyngoplasty (SLEEP) study: Subjective Outcomes of Isolated Uvulopalatopharyngoplasty. Otolaryngol Head Neck Surg. 2011; 144:  635-731.        WHAT IF I ONLY HAVE SNORING?    Mandibular advancement devices, lateral sleep positioning, long-term weight loss and treatment of nasal allergies have been shown to improve snoring.  Exercising tongue muscles with a game (https://Kony.Stunn/us/roe/ZuzuChe-reduce-snoring/hh4310271549) or stimulating the tongue during the day with a device (https://doi.org/10.3390/wus66425935) have improved snoring in some individuals.  https://www.Joyhound.TrustedCompany.com/  https://www.sleepfoundation.org/best-anti-snoring-mouthpieces-and-mouthguards    Remember to Drive Safe... Drive Alive     Sleep health profoundly affects your health, mood, and your safety.  Thirty three percent of the population (one in three of us) is not getting enough sleep and many have a sleep disorder. Not getting enough sleep or having an untreated / undertreated sleep condition may make us sleepy without even knowing it. In fact, our driving could be dramatically impaired due to our sleep health. As your provider, here are some things I would like you to know about driving:     Here are some warning signs for impairment and dangerous drowsy driving:              -Having been awake more than 16 hours               -Looking tired               -Eyelid drooping              -Head nodding (it could be too late at this point)              -Driving for more than 30 minutes     Some things you could do to make the driving safer if you are experiencing some drowsiness:              -Stop driving and rest              -Call for transportation              -Make sure your sleep disorder is adequately treated     Some things that have been shown NOT to work when experiencing drowsiness while driving:              -Turning on the radio              -Opening windows              -Eating any  distracting  /  entertaining  foods (e.g., sunflower seeds, candy, or any other)              -Talking on the phone      Your decision may not only impact your life,  but also the life of others. Please, remember to drive safe for yourself and all of us.

## 2025-05-14 NOTE — PATIENT INSTRUCTIONS
"PLAN:    Discussed tapering the oxycodone, may cut a 5 mg tablet in half for each dose, tapering every few days as able.    Discussed the use of clonidine 0.1 mg every 6 hours as needed for withdrawal symptoms including feeling sweaty or jittery.    Hydroxyzine 25 mg every 6 hours if needed for nausea for withdrawal.    Begin Wellbutrin 150 mg extended release daily for mood.    You are to see sleep medicine today.    Change the Remeron to tizanidine 4 mg tablets may start with 1/2 tablet at bedtime for 2 days, if needed 1 at bedtime.  If wake during the night after 4 hours may take another dose.    Discussed the supplement \"C-15\" to help with inflammation, eczema and asthma, may obtain online through \"fat[employer].    Discussed frequency specific microcurrent and acupuncture to help your pain, may contact [employer] [facility] at [phone].    Laboratories including vitamin D level at your primary care doctor's office.    Discussed if needed could change to buprenorphine in the form of Butrans patch or Belbuca.    Follow-up with SERENE Marquis pharmacist in 2 to 3 weeks and with Dr. Martines in 6 to 8 weeks.  "

## 2025-05-14 NOTE — PROGRESS NOTES
Name: Segundo Sellers MRN# [id number]   Age: 65 year old YOB: 1960     Date of Consultation: May 14, 2025  Consultation is requested by: Segundo Dejesus MD  [address]  [facility] [address]  [place] [address] Segundo Dejesus  Primary care provider: Segundo Dejesus       Reason for Sleep Consult:       Patient s Reason for visit  Segundo Huffmanalvino main reason for visit: (Patient-Rptd) Waking up, gasping for air  Patient states problem(s) started: (Patient-Rptd) It s been ongoing  Segundo Sellers's goals for this visit: (Patient-Rptd) Get a sleep apnea machine           Assessment and Plan:     Summary Sleep Diagnoses:  Insomnia complaint (LETITIA 23)  Excessive daytime sleepiness (ESS 12)  Clinical signs and symptoms of obstructive sleep apnea (STOP-BANG 5)      Comorbid Diagnoses:  Allergic rhinitis and asthma  Chronic pain on oxycodone and gabapentin  History of depression  Previous ADHD- urine tox screen 10/2024 oxycodone and amphetamine (prescribed)    Summary Recommendations(things to be done):  Patient is to review YouTube video regarding proper use of home sleep testing equipment and watch for communication through [employer] for next steps.  Consider initiating CPAP based on results of sleep testing.           HISTORY PRESENT ILLNESS:     Segundo Sellers is a 65 year old year old with gasping and snoring associated with panic over the past few months.  These episodes are not associated with reflux or coughing to suggest regurgitation.  He has been treated with mirtazipine in past and more recently, hydroxyzine and tizanidine for sleep and anxiety. He reports daytime sleepiness (ESS 12) when he takes mirtazipine has significant insomnia complaint attached to gasping arousals at sleep onset.     He ordered an online mandibular advancement device therapy but did not find it to be tolerable or effective and would be interested in CPAP if he has significant sleep apnea.    Patient has a good  understanding that gasping alone does not mean he has sleep apnea and that the episodes may produce his panic and anxiety but do not necessarily mean he has significant sleep apnea.           SLEEP-WAKE SCHEDULE:      Work/School Days: 9.5 hours in bed with prolonged latency   Patient goes to school/work: (Patient-Rptd) No   Usually gets into bed at (Patient-Rptd) 8 pm  Takes patient about (Patient-Rptd) 1 hr to fall asleep  Has trouble falling asleep (Patient-Rptd) Every night nights per week  Wakes up in the middle of the night (Patient-Rptd) Every night times.  Wakes up due to (Patient-Rptd) Snorting self awake, Use the bathroom, Anxiety  He has trouble falling back asleep (Patient-Rptd) Every night times a week.   It usually takes (Patient-Rptd) One hour to get back to sleep  Patient is usually up at (Patient-Rptd) 5:30 am  Uses alarm: (Patient-Rptd) No    Weekends/Non-work Days/All Other Days:  Usually gets into bed at (Patient-Rptd) 8 PM   Takes patient about (Patient-Rptd) One hour to fall asleep  Patient is usually up at (Patient-Rptd) 5:30 AM  Uses alarm: (Patient-Rptd) No    Sleep Need  Patient gets  (Patient-Rptd) Five hours sleep on average   Patient thinks he needs about (Patient-Rptd) Eight hours sleep    Segundo LUPE Armidaalvino prefers to sleep in this position(s): (Patient-Rptd) Side   Patient states they do the following activities in bed: (Patient-Rptd) Watch TV    Naps  Patient takes a purposeful nap (Patient-Rptd) Never times a week and naps are usually (Patient-Rptd) Never in duration  He feels better after a nap: (Patient-Rptd) No  He dozes off unintentionally (Patient-Rptd) Never days per week  Patient has had a driving accident or near-miss due to sleepiness/drowsiness: (Patient-Rptd) No      SLEEP DISRUPTIONS:    Breathing/Snoring    Snoring:(Patient-Rptd) Yes  Other people complain about his snoring: (Patient-Rptd) No  Others observehe stops breathing in his sleep: (Patient-Rptd) Yes  He has  issues with the following: (Patient-Rptd) Morning mouth dryness, Stuffy nose when you wake up, Getting up to urinate more than once    Movement:    Pain, discomfort, with an urge to move:  (Patient-Rptd) No  Happens when he is resting:  (Patient-Rptd) No  Happens more at night:  (Patient-Rptd) Yes  Patient has been told he kicks his legs at night:  (Patient-Rptd) Yes     Behaviors in Wakefulness/Sleep:    Dream enactment   No  Sleep eating   No  Bruxism  No                    Segundo Sellers has experienced the following behaviors while sleeping: (Patient-Rptd) Recurring Nightmares, Eating  He has experienced sudden muscle weakness during the day: (Patient-Rptd) No      Is there anything else you would like your sleep provider to know: (Patient-Rptd) I would like to be able to sleep on my back without gasping for air to help my back pain      CAFFEINE AND OTHER SUBSTANCES:    Patient consumes caffeinated beverages per day:  (Patient-Rptd) 1  Last caffeine use is usually: (Patient-Rptd) Morning  List of any prescribed or over the counter stimulants that patient takes:    List of any prescribed or over the counter sleep medication patient takes:    List of previous sleep medications that patient has tried:    Patient drinks alcohol to help them sleep: (Patient-Rptd) No  Patient drinks alcohol near bedtime: (Patient-Rptd) No    Family History:  Patient has a family member been diagnosed with a sleep disorder: (Patient-Rptd) No                 SCALES:       EPWORTH SLEEPINESS SCALE         5/11/2025    12:37 PM    [place] Sleepiness Scale ( ALBERTO Mcmullen  [id number]<br>ESS - [place]/English - Final version - 21 Nov 07 - [facility] [place].)   Sitting and reading Moderate chance of dozing   Watching TV Moderate chance of dozing   Sitting, inactive in a public place (e.g. a theatre or a meeting) Moderate chance of dozing   As a passenger in a car for an hour without a break Moderate chance of dozing   Lying down to rest in  the afternoon when circumstances permit Slight chance of dozing   Sitting and talking to someone Slight chance of dozing   Sitting quietly after a lunch without alcohol Slight chance of dozing   In a car, while stopped for a few minutes in traffic Slight chance of dozing   [place] Score (MC) 12   [place] Score (Sleep) 12        Patient-reported         INSOMNIA SEVERITY INDEX (LETITIA)          5/11/2025    12:27 PM   Insomnia Severity Index (LETITIA)   Difficulty falling asleep 3   Difficulty staying asleep 3   Problems waking up too early 3   How SATISFIED/DISSATISFIED are you with your CURRENT sleep pattern? 4   How NOTICEABLE to others do you think your sleep problem is in terms of impairing the quality of your life? 3   How WORRIED/DISTRESSED are you about your current sleep problem? 4   To what extent do you consider your sleep problem to INTERFERE with your daily functioning (e.g. daytime fatigue, mood, ability to function at work/daily chores, concentration, memory, mood, etc.) CURRENTLY? 3   LETITIA Total Score 23        Patient-reported       Guidelines for Scoring/Interpretation:  Total score categories:  0-7 = No clinically significant insomnia   8-14 = Subthreshold insomnia   15-21 = Clinical insomnia (moderate severity)  22-28 = Clinical insomnia (severe)  Used via courtesy of www.[employer].va.gov with permission from Isaias Bynum PhD., [facility]      STOP BANG         5/11/2025    12:38 PM   STOP BANG Questionnaire (  2008, the American Society of Anesthesiologists, Inc. [employer]   1. Snoring - Do you snore loudly (louder than talking or loud enough to be heard through closed doors)? Yes   2. Tired - Do you often feel tired, fatigued, or sleepy during daytime? Yes   3. Observed - Has anyone observed you stop breathing during your sleep? Yes   4. Blood pressure - Do you have or are you being treated for high blood pressure? No   5. BMI - BMI more than 35 kg/m2? No   6. Age -  Age over 50 yr old? Yes   7. Neck circumference - Neck circumference greater than 40 cm? No   8. Gender - Gender male? Yes   STOP BANG Score (MC): 5 (High risk of ANDRE)         GAD7        5/11/2025    12:19 PM   ANTONIO-7    1. Feeling nervous, anxious, or on edge 3   2. Not being able to stop or control worrying 3   3. Worrying too much about different things 3   4. Trouble relaxing 2   5. Being so restless that it is hard to sit still 0   6. Becoming easily annoyed or irritable 2   7. Feeling afraid, as if something awful might happen 1   ANTONIO-7 Total Score 14    If you checked any problems, how difficult have they made it for you to do your work, take care of things at home, or get along with other people? Not difficult at all       Patient-reported           PATIENT HEALTH QUESTIONNAIRE-9 (PHQ - 9)        4/6/2025     9:27 AM   PHQ-9 ([employer])   1.  Little interest or pleasure in doing things 3   2.  Feeling down, depressed, or hopeless 3   3.  Trouble falling or staying asleep, or sleeping too much 2   4.  Feeling tired or having little energy 3   5.  Poor appetite or overeating 0   6.  Feeling bad about yourself - or that you are a failure or have let yourself or your family down 3   7.  Trouble concentrating on things, such as reading the newspaper or watching television 1   8.  Moving or speaking so slowly that other people could have noticed. Or the opposite - being so fidgety or restless that you have been moving around a lot more than usual 0   9.  Thoughts that you would be better off dead, or of hurting yourself in some way 0   PHQ-9 Total Score 15    6.  Feeling bad about yourself 3   7.  Trouble concentrating 1   8.  Moving slowly or restless 0   9.  Suicidal or self-harm thoughts 0   1.  Little interest or pleasure in doing things Nearly every day   2.  Feeling down, depressed, or hopeless Nearly every day   3.  Trouble falling or staying asleep, or sleeping too much More than half the days   4.  Feeling  tired or having little energy Nearly every day   5.  Poor appetite or overeating Not at all   6.  Feeling bad about yourself Nearly every day   7.  Trouble concentrating Several days   8.  Moving slowly or restless Not at all   9.  Suicidal or self-harm thoughts Not at all   PHQ[facility]9 via [facility] TOTAL SCORE-----> 15 (Moderately severe depression)   Difficulty at work, home, or with people Not difficult at all       Patient-reported       Developed by José Osei, Rachel Quintanilla, Rubio Maldonado and colleagues, with an educational risa from [employer] No permission required to reproduce, translate, display or distribute.        Allergies:    No Known Allergies         Problem List:     Patient Active Problem List   Diagnosis    Allergic Rhinitis    Eczema    Dyslipidemia    Asthma    Benign Prostatic Hypertrophy    Lethargy    Impaired Fasting Glucose    Current moderate episode of major depressive disorder without prior episode (H)    Slowing of urinary stream    Pelvic and perineal pain    Dyslipidemia    Abdominal pain    Lower urinary tract symptoms    Spondylolisthesis of lumbar region    Benign neoplasm of sigmoid colon    Diverticular disease of large intestine    Polyp of colon            MEDICATIONS:     Current Outpatient Medications   Medication Sig Dispense Refill    acetaminophen (TYLENOL) 325 MG tablet Take 2 tablets (650 mg) by mouth every 4 hours as needed for other (For optimal non-opioid multimodal pain management to improve pain control.) 100 tablet 0    albuterol (PROAIR HFA) 90 mcg/actuation inhaler Inhale 1-2 puffs into the lungs every 4 hours as needed      docusate sodium (COLACE) 100 MG capsule Take 1 capsule (100 mg) by mouth daily      fluticasone (FLONASE) 50 MCG/ACT nasal spray USE 2 SPRAYS IN EACH NOSTIL DAILY 16 g 4    gabapentin (NEURONTIN) 100 MG capsule Take 2 capsules (200 mg) by mouth 2 times daily 30 capsule 0    meloxicam (MOBIC) 15 MG tablet Take 15 mg by mouth  daily.      methocarbamol (ROBAXIN) 750 MG tablet Take 1 tablet (750 mg) by mouth every 6 hours 30 tablet 0    mirtazapine (REMERON) 7.5 MG tablet Take 1 tablet (7.5 mg) by mouth at bedtime. 30 tablet 2    montelukast (SINGULAIR) 10 mg tablet Take 10 mg by mouth daily      oxyCODONE (ROXICODONE) 5 MG tablet Take 1 tablet (5 mg) by mouth every 4 hours as needed for severe pain. 150 tablet 0    senna-docusate (SENOKOT-S/PERICOLACE) 8.6-50 MG tablet Take 2 tablets by mouth 2 times daily 60 tablet 0    sildenafil (VIAGRA) 100 MG tablet Take 1 tablet (100 mg) by mouth daily as needed. 30 tablet 1    tamsulosin (FLOMAX) 0.4 MG capsule Take 0.4 mg by mouth every evening.         Problem List:  Patient Active Problem List    Diagnosis Date Noted    Spondylolisthesis of lumbar region 06/11/2024     Priority: Medium    Dyslipidemia 10/29/2021     Priority: Medium     Formatting of this note might be different from the original.  Created by Conversion      Pelvic and perineal pain 04/22/2020     Priority: Medium    Benign neoplasm of sigmoid colon 03/31/2020     Priority: Medium    Diverticular disease of large intestine 03/27/2020     Priority: Medium    Polyp of colon 03/27/2020     Priority: Medium    Current moderate episode of major depressive disorder without prior episode (H) 03/16/2020     Priority: Medium    Slowing of urinary stream 02/28/2020     Priority: Medium    Abdominal pain 02/28/2020     Priority: Medium    Lower urinary tract symptoms 02/28/2020     Priority: Medium    Asthma      Priority: Medium     Created by Conversion        Allergic Rhinitis      Priority: Medium     Created by Conversion  Replacement Utility updated for latest IMO load        Eczema      Priority: Medium     Created by Conversion        Dyslipidemia      Priority: Medium     Created by Conversion        Benign Prostatic Hypertrophy      Priority: Medium     Created by Conversion        Lethargy      Priority: Medium     Created by  Conversion        Impaired Fasting Glucose      Priority: Medium     Created by Conversion            Past Medical/Surgical History:  Past Medical History:   Diagnosis Date    Abdominal pain 02/28/2020    Allergic rhinitis     Created by Conversion Replacement Utility updated for latest IMO load     Asthma     Created by Conversion     Benign Prostatic Hypertrophy     Created by Conversion     Current moderate episode of major depressive disorder without prior episode (H) 03/16/2020    Dyslipidemia 10/29/2021    Formatting of this note might be different from the original. Created by Conversion    Dyslipidemia     Created by Conversion     Eczema     Created by Conversion     Lethargy     Created by Conversion     Lower urinary tract symptoms 02/28/2020    Pelvic and perineal pain 04/22/2020    Slowing of urinary stream 02/28/2020     Past Surgical History:   Procedure Laterality Date    ARTHROSCOPY KNEE Left     DAVINCI XI HERNIORRHAPHY INGUINAL Right 8/2/2023    Procedure: HERNIORRHAPHY, INGUINAL, ROBOT-ASSISTED, LAPAROSCOPIC, USING DA AVEL XI;  Surgeon: Neftaly Rm MD;  Location: [facility] Main OR    FUSION, SPINE, LUMBAR, 1 LEVEL, COMBINED ANTERIOR AND POSTERIOR APPROACHES, W/INST, USING OTS N/A 6/11/2024    Procedure: LUMBAR 5 - SACRAL 1 ANTERIOR LUMBAR INTERBODY FUSION, POSTERIOR INSTRUMENTED FUSION WITH O ARM AND STEALTH NAVIGATION;  Surgeon: Wilfrido Desir MD;  Location: [facility] Main OR    SURGICAL EXPOSURE, ANTERIOR APPROACH, W/WOUND CLOSURE, FOR LUMBAR SPINE SURGERY, BY GENERAL SURGERY N/A 6/11/2024    Procedure: SURGICAL EXPOSURE, ANTERIOR APPROACH, WITH WOUND CLOSURE, FOR LUMBAR SPINE SURGERY, BY GENERAL SURGERY;  Surgeon: Wilfrido Desir MD;  Location: [facility] Main OR       Social History:  Social History     Socioeconomic History    Marital status:      Spouse name: Not on file    Number of children: Not on file    Years of education: Not on file     Highest education level: Not on file   Occupational History    Not on file   Tobacco Use    Smoking status: Never     Passive exposure: Never    Smokeless tobacco: Former     Types: Chew   Vaping Use    Vaping status: Never Used   Substance and Sexual Activity    Alcohol use: Yes     Alcohol/week: 2.0 standard drinks of alcohol     Types: 2 Standard drinks or equivalent per week     Comment: 4 per week    Drug use: No    Sexual activity: Yes     Partners: Female     Comment:    Other Topics Concern    Not on file   Social History Narrative    Not on file     Social Drivers of Health     Financial Resource Strain: Low Risk  (4/2/2025)    Financial Resource Strain     Within the past 12 months, have you or your family members you live with been unable to get utilities (heat, electricity) when it was really needed?: No   Food Insecurity: Low Risk  (4/2/2025)    Food Insecurity     Within the past 12 months, did you worry that your food would run out before you got money to buy more?: No     Within the past 12 months, did the food you bought just not last and you didn t have money to get more?: No   Transportation Needs: Low Risk  (4/2/2025)    Transportation Needs     Within the past 12 months, has lack of transportation kept you from medical appointments, getting your medicines, non-medical meetings or appointments, work, or from getting things that you need?: No   Physical Activity: Sufficiently Active (4/2/2025)    Exercise Vital Sign     Days of Exercise per Week: 3 days     Minutes of Exercise per Session: 60 min   Stress: Stress Concern Present (4/2/2025)    [employer] - Occupational Stress Questionnaire     Feeling of Stress : Rather much   Social Connections: Unknown (4/2/2025)    Social Connection and Isolation Panel [NHANES]     Frequency of Communication with Friends and Family: Not on file     Frequency of Social Gatherings with Friends and Family: Never     Attends  Tenriism Services: Not on file     Active Member of Clubs or Organizations: Not on file     Attends Club or Organization Meetings: Not on file     Marital Status: Not on file   Interpersonal Safety: Low Risk  (10/7/2024)    Interpersonal Safety     Do you feel physically and emotionally safe where you currently live?: Yes     Within the past 12 months, have you been hit, slapped, kicked or otherwise physically hurt by someone?: No     Within the past 12 months, have you been humiliated or emotionally abused in other ways by your partner or ex-partner?: No   Housing Stability: Low Risk  (4/2/2025)    Housing Stability     Do you have housing? : Yes     Are you worried about losing your housing?: No       Family History:  Family History   Problem Relation Age of Onset    No Known Problems Mother     Hypertension Father        Review of Systems:  A complete review of systems reviewed by me is negative with the exeption of what has been mentioned in the history of present illness.  In the last TWO WEEKS have you experienced any of the following symptoms?  Fevers: (Patient-Rptd) No  Night Sweats: (Patient-Rptd) No  Weight Gain: (Patient-Rptd) No  Pain at Night: (Patient-Rptd) Yes  Double Vision: (Patient-Rptd) No  Changes in Vision: (Patient-Rptd) Yes  Difficulty Breathing through Nose: (Patient-Rptd) Yes  Sore Throat in Morning: (Patient-Rptd) Yes  Dry Mouth in the Morning: (Patient-Rptd) Yes  Shortness of Breath Lying Flat: (Patient-Rptd) Yes  Shortness of Breath With Activity: (Patient-Rptd) Yes  Awakening with Shortness of Breath: (Patient-Rptd) Yes  Increased Cough: (Patient-Rptd) Yes  Heart Racing at Night: (Patient-Rptd) Yes  Swelling in Feet or Legs: (Patient-Rptd) No  Diarrhea at Night: (Patient-Rptd) No  Heartburn at Night: (Patient-Rptd) No  Urinating More than Once at Night: (Patient-Rptd) Yes  Losing Control of Urine at Night: (Patient-Rptd) No  Joint Pains at Night: (Patient-Rptd) Yes  Headaches in  "Morning: (Patient-Rptd) Yes  Weakness in Arms or Legs: (Patient-Rptd) No  Depressed Mood: (Patient-Rptd) Yes  Anxiety: (Patient-Rptd) Yes          Physical Examination:     /80   Pulse 63   Ht 1.854 m (6' 0.99\")   Wt 96.8 kg (213 lb 8 oz)   SpO2 97%   BMI 28.17 kg/m      GENERAL: alert and no distress  EYES: Eyes grossly normal to inspection.  No discharge or erythema, or obvious scleral/conjunctival abnormalities.  RESP: No audible wheeze, cough, or visible cyanosis.    SKIN: Visible skin clear. No significant rash, abnormal pigmentation or lesions.  NEURO: Cranial nerves grossly intact.  Mentation and speech appropriate for age.  PSYCH: Appropriate affect, tone, and pace of words                  Data: All pertinent previous laboratory data reviewed     Recent Labs   Lab Test 04/07/25  0842 06/11/24  1059 05/24/24  0821     --  137   POTASSIUM 4.6  --  4.6   CHLORIDE 101  --  102   CO2 24  --  26   ANIONGAP 13  --  9   * 109* 108*   BUN 18.9  --  20.1   CR 0.94  --  1.04   ANGELA 9.9  --  9.5       Recent Labs   Lab Test 04/07/25  0842   WBC 7.4   RBC 4.53   HGB 14.5   HCT 41.1   MCV 91   MCH 32.0   MCHC 35.3   RDW 12.3          Recent Labs   Lab Test 04/07/25  0842   PROTTOTAL 7.1   ALBUMIN 4.5   BILITOTAL 1.1   ALKPHOS 69   AST 26   ALT 28       TSH (uIU/mL)   Date Value   04/07/2025 1.30       Amphetamines Urine (no units)   Date Value   05/22/2023 Screen Positive (A)     Barbituates Urine (no units)   Date Value   05/22/2023 Screen Negative     Cannabinoids Urine (no units)   Date Value   05/22/2023 Screen Negative     Cocaine Urine (no units)   Date Value   05/22/2023 Screen Negative     Opiates Urine (no units)   Date Value   05/22/2023 Screen Negative     PCP Urine (no units)   Date Value   05/22/2023 Screen Negative           KETAN HANEY MD 5/14/2025     Total time spent reviewing medical records including previous testing and interpretation as well as direct patient contact " and documentation on this date: 45 minutes

## 2025-05-14 NOTE — PROGRESS NOTES
Patient presents to the clinic today for a visit with GRANT SANTILLAN MD regarding Pain Management.    UDS/CSA- 10.07.2024    Medications- Oxycodone this am ten mg so far today    Notes Would like to get off Oxycodone and find alternatives    Jazmine Crespo  [facility] Clinical Assistant  Answers submitted by the patient for this visit:  Patient Health Questionnaire (G7) (Submitted on 5/11/2025)  ANTONIO 7 TOTAL SCORE: 14

## 2025-05-14 NOTE — PROGRESS NOTES
"                      [facility] Consultation    Date of visit: 2025    Reason for consultation:    Segundo Sellers is a 65 year old male who is seen in consultation today at the request of PCP Dr. Dejesus, whose  note indicates patient is taking oxycodone, feels not just taking to control pain but feels better overall concerned that is a bad thing, wants to find other ways to manage pain.  Also having depression and to see a sleep specialist.  Using 5 mg 5 tablets a day.      65-year-old male living in [place] by himself.  Wife  2 years ago cancer.  Retired , no children.      Chief Complaint:    Chief Complaint   Patient presents with    Pain     Reviews having history of back pain, with the surgery last  L5-S1 fusion with Dr. Desir.  Also history of left knee replacement right shoulder surgery.  Using oxycodone 5 mg 5 times a day.  Reports has been on this for some 2 years, feeling started to affect him in his mental health.  Sometimes he can feel depressed not wanting to get off the couch.  Notes the medication can sometimes help with energy.  Cautious that he never takes it in other ways than as described.  Worried about \"addiction\" in his bespoke further noting anybody taken this medication may have some dependence opiate risk for withdrawal.    Acknowledges depression has been a concern caring for his wife before she .  Was on duloxetine and Zoloft which made him feel weird.    Had been on Adderall in the past wonders about going back onto that.    He does not sleep well, Remeron added recently at 7.5 mg made him tired through the day.  He is to have a visit with sleep medicine soon, notes he has troubles waking up short of breath and gasping so wonders about sleep apnea.    Reviewing the record he has been on Wellbutrin in the past which he thinks helped with his emotions when his wife was sick, and then wanted to get off that as he " did not want to be on medications.    He is to have [employer] testing to review his sensitive medications.    He had heard about a medicine called Suboxone, wonders about that.  Reviewed that it is a form of opioid that can be helpful for opioid use disorder, also helpful for chronic pain and reviewed how buprenorphine can be different than oxycodone in some respects, and available at lower doses in the form of Belbuca or Butrans patch    Appetite is good, trying to eat lighter workout.  Energy can be low at times.    Recalls taking some prednisone at times which helped his back most recently had a trial which was not helpful.  Does not want further injections presently.    Does not use cigarettes.  He quit alcohol as he recognized that he was down in [place] drinking by himself and did not want to develop a problem.  Has tried some CBD Gummies which were perhaps helpful but again did not want to get reliable anything.    Uses Flomax for prostate problems.  Has asthma and eczema.  He reports easy skin bruising, we noted could possibly be due to SSRIs and platelets        Past Medical History:  Past Medical History:   Diagnosis Date    Abdominal pain 02/28/2020    Allergic rhinitis     Created by Conversion Replacement Utility updated for latest IMO load     Asthma     Created by Conversion     Benign Prostatic Hypertrophy     Created by Conversion     Current moderate episode of major depressive disorder without prior episode (H) 03/16/2020    Dyslipidemia 10/29/2021    Formatting of this note might be different from the original. Created by Conversion    Dyslipidemia     Created by Conversion     Eczema     Created by Conversion     Lethargy     Created by Conversion     Lower urinary tract symptoms 02/28/2020    Pelvic and perineal pain 04/22/2020    Slowing of urinary stream 02/28/2020     Patient Active Problem List    Diagnosis Date Noted    Spondylolisthesis of lumbar region 06/11/2024     Priority: Medium     Dyslipidemia 10/29/2021     Priority: Medium     Formatting of this note might be different from the original.  Created by Conversion      Pelvic and perineal pain 04/22/2020     Priority: Medium    Benign neoplasm of sigmoid colon 03/31/2020     Priority: Medium    Diverticular disease of large intestine 03/27/2020     Priority: Medium    Polyp of colon 03/27/2020     Priority: Medium    Current moderate episode of major depressive disorder without prior episode (H) 03/16/2020     Priority: Medium    Slowing of urinary stream 02/28/2020     Priority: Medium    Abdominal pain 02/28/2020     Priority: Medium    Lower urinary tract symptoms 02/28/2020     Priority: Medium    Asthma      Priority: Medium     Created by Conversion        Allergic Rhinitis      Priority: Medium     Created by Conversion  Replacement Utility updated for latest IMO load        Eczema      Priority: Medium     Created by Conversion        Dyslipidemia      Priority: Medium     Created by Conversion        Benign Prostatic Hypertrophy      Priority: Medium     Created by Conversion        Lethargy      Priority: Medium     Created by Conversion        Impaired Fasting Glucose      Priority: Medium     Created by Conversion           Past Surgical History:  Past Surgical History:   Procedure Laterality Date    ARTHROSCOPY KNEE Left     DAVINCI XI HERNIORRHAPHY INGUINAL Right 8/2/2023    Procedure: HERNIORRHAPHY, INGUINAL, ROBOT-ASSISTED, LAPAROSCOPIC, USING DA AVEL XI;  Surgeon: Neftaly Rm MD;  Location: [facility] OR    FUSION, SPINE, LUMBAR, 1 LEVEL, COMBINED ANTERIOR AND POSTERIOR APPROACHES, W/INST, USING OTS N/A 6/11/2024    Procedure: LUMBAR 5 - SACRAL 1 ANTERIOR LUMBAR INTERBODY FUSION, POSTERIOR INSTRUMENTED FUSION WITH O ARM AND STEALTH NAVIGATION;  Surgeon: Wilfrido Desir MD;  Location: [facility] OR    SURGICAL EXPOSURE, ANTERIOR APPROACH, W/WOUND CLOSURE, FOR LUMBAR SPINE SURGERY, BY GENERAL  SURGERY N/A 6/11/2024    Procedure: SURGICAL EXPOSURE, ANTERIOR APPROACH, WITH WOUND CLOSURE, FOR LUMBAR SPINE SURGERY, BY GENERAL SURGERY;  Surgeon: Wilfrido Desir MD;  Location: [facility] Main OR     Medications:  Current Outpatient Medications   Medication Sig Dispense Refill    acetaminophen (TYLENOL) 325 MG tablet Take 2 tablets (650 mg) by mouth every 4 hours as needed for other (For optimal non-opioid multimodal pain management to improve pain control.) 100 tablet 0    albuterol (PROAIR HFA) 90 mcg/actuation inhaler Inhale 1-2 puffs into the lungs every 4 hours as needed      buPROPion (WELLBUTRIN XL) 150 MG 24 hr tablet Take 1 tablet (150 mg) by mouth every morning. 30 tablet 4    cloNIDine (CATAPRES) 0.1 MG tablet Take 1 tablet (0.1 mg) by mouth every 6 hours as needed (withdrawal). 30 tablet 2    docusate sodium (COLACE) 100 MG capsule Take 1 capsule (100 mg) by mouth daily      fluticasone (FLONASE) 50 MCG/ACT nasal spray USE 2 SPRAYS IN EACH NOSTIL DAILY 16 g 4    gabapentin (NEURONTIN) 100 MG capsule Take 2 capsules (200 mg) by mouth 2 times daily 30 capsule 0    hydrOXYzine HCl (ATARAX) 25 MG tablet Take 1 tablet (25 mg) by mouth 3 times daily as needed for itching or other (withdrawal). 30 tablet 3    meloxicam (MOBIC) 15 MG tablet Take 15 mg by mouth daily.      methocarbamol (ROBAXIN) 750 MG tablet Take 1 tablet (750 mg) by mouth every 6 hours 30 tablet 0    montelukast (SINGULAIR) 10 mg tablet Take 10 mg by mouth daily      oxyCODONE (ROXICODONE) 5 MG tablet Take 1 tablet (5 mg) by mouth every 4 hours as needed for severe pain. 150 tablet 0    senna-docusate (SENOKOT-S/PERICOLACE) 8.6-50 MG tablet Take 2 tablets by mouth 2 times daily 60 tablet 0    sildenafil (VIAGRA) 100 MG tablet Take 1 tablet (100 mg) by mouth daily as needed. 30 tablet 1    tamsulosin (FLOMAX) 0.4 MG capsule Take 0.4 mg by mouth every evening.      tiZANidine (ZANAFLEX) 4 MG tablet One tab bedtime, may repeat  after four hours if needed 60 tablet 3    fluocinonide (LIDEX) 0.05 % external solution        Allergies:   No Known Allergies    Social/Developmental HX: Raised in [place] third of 5 children.  Father was a .  He was raised [employer].  Had trade school for heavy equipment.  Hobbies having golf tennis and biking.  No longer actively involved.  Notes no issues of abuse and trauma though difficult with his wife dying of cancer.    Family history:  Family History   Problem Relation Age of Onset    No Known Problems Mother     Hypertension Father        Physical Exam:  Vitals:    05/14/25 0847   BP: (!) 140/83   Pulse: 61   SpO2: 98%     Exam:    Alert, clear sensorium, no respiratory distress, no pain behavior.  Has notebooks with his medical history.  Affect somewhat anxious.  Able to go from sitting to standing easily without pain behavior.      [place] Prescription Monitoring Program reviewed: Reviewed      History of back pain, having L5-S1 fusion thatcher recovered well.  Has been on oxycodone 5 mg 5 times a day for 2 years.  Reviews concerns that he may be developing unhealthy relationship or dependence with it.  Has said at times it helps him feel better and energy to move, and wonder about depression but may be related to questions about this.    We reviewed today does not appear to have any evidence of injection indicated by loss of control or use for the wrong purpose.    Reviewed anybody taking this medicine long-term would develop dependence which is a physiologic phenomenon for which stopping it abruptly could have withdrawal.    We reviewed opioid withdrawal being relatively less dangerous than alcohol withdrawal and he noted he stopped drinking alcohol without much problems.    We reviewed agents to help facilitate withdrawal such as clonidine and hydroxyzine.    Discussed he can start tapering schedule breaking some of the oxycodone in half for doses.    Reviewed supporting mental  health concerns that Wellbutrin has a different mechanism of action than the SSRIs and SNRIs and may be helpful to add back him.    Encouraged him to address concern for sleep apnea.  In the interim he was concerned about alternatives to Remeron we discussed the use of tizanidine which could be used to help if he falls asleep and when he wakes frequently due to his nocturia could repeat the dose.    Discussed the use of the newer found 15 carb and fatty acid which may help with his autoimmune conditions with eczema and asthma.    Will obtain baseline laboratories including vitamin D.    Reviewed strategies for tapering the oxycodone using the clonidine and hydroxyzine.    Discussed if necessary in the future could use low-dose buprenorphine such as the Butrans patch.    Discussed the modality of frequency specific microcurrent and resources.    Will have him follow-up with our MTM pharmacist and then the undersigned.  Total time 65 minutes minutes spent on the date of encounter doing chart review, history, and exam documentation and further activities as noted above.     Juvenal Martines MD  [facility]      Answers submitted by the patient for this visit:  Patient Health Questionnaire (G7) (Submitted on 5/11/2025)  ANTONIO 7 TOTAL SCORE: 14

## 2025-05-19 ENCOUNTER — OFFICE VISIT (OUTPATIENT)
Dept: FAMILY MEDICINE | Facility: CLINIC | Age: 65
End: 2025-05-19
Payer: MEDICARE

## 2025-05-19 VITALS
BODY MASS INDEX: 27.95 KG/M2 | WEIGHT: 210.9 LBS | RESPIRATION RATE: 22 BRPM | TEMPERATURE: 98.1 F | HEART RATE: 58 BPM | DIASTOLIC BLOOD PRESSURE: 78 MMHG | HEIGHT: 73 IN | OXYGEN SATURATION: 98 % | SYSTOLIC BLOOD PRESSURE: 142 MMHG

## 2025-05-19 DIAGNOSIS — R35.0 URINARY FREQUENCY: ICD-10-CM

## 2025-05-19 DIAGNOSIS — M48.061 SPINAL STENOSIS OF LUMBAR REGION WITHOUT NEUROGENIC CLAUDICATION: ICD-10-CM

## 2025-05-19 DIAGNOSIS — G47.00 INSOMNIA, UNSPECIFIED TYPE: Primary | ICD-10-CM

## 2025-05-19 DIAGNOSIS — E55.9 VITAMIN D DEFICIENCY, UNSPECIFIED: ICD-10-CM

## 2025-05-19 DIAGNOSIS — F41.9 ANXIETY: ICD-10-CM

## 2025-05-19 DIAGNOSIS — F32.1 CURRENT MODERATE EPISODE OF MAJOR DEPRESSIVE DISORDER WITHOUT PRIOR EPISODE (H): ICD-10-CM

## 2025-05-19 DIAGNOSIS — R07.0 THROAT PAIN: ICD-10-CM

## 2025-05-19 LAB
CRP SERPL-MCNC: <3 MG/L
VIT D+METAB SERPL-MCNC: 75 NG/ML (ref 20–50)

## 2025-05-19 PROCEDURE — 86140 C-REACTIVE PROTEIN: CPT | Performed by: FAMILY MEDICINE

## 2025-05-19 PROCEDURE — 1126F AMNT PAIN NOTED NONE PRSNT: CPT | Performed by: FAMILY MEDICINE

## 2025-05-19 PROCEDURE — 99214 OFFICE O/P EST MOD 30 MIN: CPT | Performed by: FAMILY MEDICINE

## 2025-05-19 PROCEDURE — 81291 MTHFR GENE: CPT | Mod: GZ | Performed by: FAMILY MEDICINE

## 2025-05-19 PROCEDURE — 82306 VITAMIN D 25 HYDROXY: CPT | Performed by: FAMILY MEDICINE

## 2025-05-19 PROCEDURE — 3078F DIAST BP <80 MM HG: CPT | Performed by: FAMILY MEDICINE

## 2025-05-19 PROCEDURE — 3077F SYST BP >= 140 MM HG: CPT | Performed by: FAMILY MEDICINE

## 2025-05-19 PROCEDURE — 36415 COLL VENOUS BLD VENIPUNCTURE: CPT | Performed by: FAMILY MEDICINE

## 2025-05-19 RX ORDER — ZOLPIDEM TARTRATE 5 MG/1
5 TABLET ORAL
Qty: 30 TABLET | Refills: 1 | Status: SHIPPED | OUTPATIENT
Start: 2025-05-19

## 2025-05-19 ASSESSMENT — PAIN SCALES - GENERAL: PAINLEVEL_OUTOF10: NO PAIN (0)

## 2025-05-19 NOTE — PROGRESS NOTES
"Segundo Sellers  BP (!) 142/78   Pulse 58   Temp 98.1  F (36.7  C) (Oral)   Resp 22   Ht 1.854 m (6' 1\")   Wt 95.7 kg (210 lb 14.4 oz)   SpO2 98%   BMI 27.82 kg/m       Assessment/Plan:                Segundo was seen today for recheck medication.    Diagnoses and all orders for this visit:    Insomnia, unspecified type  -     zolpidem (AMBIEN) 5 MG tablet; Take 1 tablet (5 mg) by mouth nightly as needed for sleep.    Spinal stenosis of lumbar region without neurogenic claudication  -     Vitamin D deficiency screening  -     CRP inflammation  -     MTHFR genotype    Current moderate episode of major depressive disorder without prior episode (H)    Anxiety    Throat pain    Vitamin D deficiency, unspecified  -     Vitamin D deficiency screening    Urinary frequency         DISCUSSION  See discussion below.  Subjective:     HPI:    Segundo Sellers is a 65 year old male he has had a recent complex health concerns including depression, anxiety, chronic pain management, insomnia, urinary frequency and throat pain.    He has longstanding pain secondary to spinal stenosis he been on oxycodone over the past couple of years.  He had been seen by pain specialist consultation note reviewed and discussed.  Patient is strongly noted to get off oxycodone and since our last encounter has tapered off completely.  He is denying any withdrawal symptoms.  Plans to continue to avoid oxycodone, he is commended for his great efforts in getting off the medication.  Discussed continued efforts to continue to manage pain and other ways.    He has significant sleep difficulties probably primarily insomnia but some concern that he may have sleep disordered breathing and possibly sleep apnea.  He is waking gasping again on occasion.  When we last spoke mirtazapine seem to be helpful for sleep but since that time patient reported that it became sedating into the day and he had declined to take it.  When he spoke with pain specialist " he was changed bupropion which had been helpful in the past.  Other medications we have tried recently including sertraline and duloxetine that led to unfavorable side effects.  Duloxetine seem to lead to acute urinary obstruction.  He does have BPH and symptoms of BPH which are ongoing.  He had been on tamsulosin questionable as to how much it helped.  He was worried that the medication may cause mental health side effects so we stopped it.  Discussed restarting tamsulosin and monitoring urinary symptoms as he is tapered off of the oxycodone which may be of benefit.  We may need to revisit other options for helping with his urinary frequency.    We have a long discussion today regarding sleep.  He definitely has a component of insomnia.  He had elected to retry zolpidem which he found helpful.  We had a long discussion today regarding use of medications for sleep and the potential dangers of medication specifically like zolpidem.  We agreed at this point that he could use the medication in the short-term to help with sleep but our long-term goal should be to try to taper off of this medication as well.  We reviewed briefly some considerations related to sleep hygiene.    From a mental health standpoint he is seeing a therapist he feels more optimistic about being on the bupropion.  He does inquire again about potentially restarting medications for ADHD we elected to defer any further discussion of that for right now as we continue to equilibrate on his current medication regimen.  We discussed overall allowing his body to equilibrate to the current changes before we continue to add medications.  Will try to keep the focus with him on utilizing medications in a limited amount and not striving for treating all symptoms in an acute basis with medication treatment.  He does have [employer] testing which is pending currently discussed the benefits of adding in today.    He has throat pain.  He has a small white spot on  the left tonsillar area.  It is actually improving over time.  It is nearly gone at this point but is still present with sensation of a slight scratchy throat he is very worried about it.  Discussed extensively the positive implications of it continuing to seemingly resolved.  Do not feel that imaging evaluation is needed at this point however if symptoms worsen would proceed with that.    ROS:  Complete review of systems is obtained.  Other than the specific considerations noted above complete review of systems is negative.          Objective:   Medications:  Current Outpatient Medications   Medication Sig Dispense Refill    acetaminophen (TYLENOL) 325 MG tablet Take 2 tablets (650 mg) by mouth every 4 hours as needed for other (For optimal non-opioid multimodal pain management to improve pain control.) 100 tablet 0    albuterol (PROAIR HFA) 90 mcg/actuation inhaler Inhale 1-2 puffs into the lungs every 4 hours as needed      buPROPion (WELLBUTRIN XL) 150 MG 24 hr tablet Take 1 tablet (150 mg) by mouth every morning. 30 tablet 4    cloNIDine (CATAPRES) 0.1 MG tablet Take 1 tablet (0.1 mg) by mouth every 6 hours as needed (withdrawal). 30 tablet 2    docusate sodium (COLACE) 100 MG capsule Take 1 capsule (100 mg) by mouth daily      fluocinonide (LIDEX) 0.05 % external solution       fluticasone (FLONASE) 50 MCG/ACT nasal spray USE 2 SPRAYS IN EACH NOSTIL DAILY 16 g 4    gabapentin (NEURONTIN) 100 MG capsule Take 2 capsules (200 mg) by mouth 2 times daily 30 capsule 0    hydrOXYzine HCl (ATARAX) 25 MG tablet Take 1 tablet (25 mg) by mouth 3 times daily as needed for itching or other (withdrawal). 30 tablet 3    meloxicam (MOBIC) 15 MG tablet Take 15 mg by mouth daily.      montelukast (SINGULAIR) 10 mg tablet Take 10 mg by mouth daily      senna-docusate (SENOKOT-S/PERICOLACE) 8.6-50 MG tablet Take 2 tablets by mouth 2 times daily 60 tablet 0    sildenafil (VIAGRA) 100 MG tablet Take 1 tablet (100 mg) by mouth daily  as needed. 30 tablet 1    tamsulosin (FLOMAX) 0.4 MG capsule Take 0.4 mg by mouth every evening.      tiZANidine (ZANAFLEX) 4 MG tablet One tab bedtime, may repeat after four hours if needed 60 tablet 3    zolpidem (AMBIEN) 5 MG tablet Take 1 tablet (5 mg) by mouth nightly as needed for sleep. 30 tablet 1     No current facility-administered medications for this visit.        Allergies:   No Known Allergies     Social History     Socioeconomic History    Marital status:      Spouse name: Not on file    Number of children: Not on file    Years of education: Not on file    Highest education level: Not on file   Occupational History    Not on file   Tobacco Use    Smoking status: Never     Passive exposure: Never    Smokeless tobacco: Former     Types: Chew   Vaping Use    Vaping status: Never Used   Substance and Sexual Activity    Alcohol use: Yes     Alcohol/week: 2.0 standard drinks of alcohol     Types: 2 Standard drinks or equivalent per week     Comment: 4 per week    Drug use: No    Sexual activity: Yes     Partners: Female     Comment:    Other Topics Concern    Not on file   Social History Narrative    Not on file     Social Drivers of Health     Financial Resource Strain: Low Risk  (4/2/2025)    Financial Resource Strain     Within the past 12 months, have you or your family members you live with been unable to get utilities (heat, electricity) when it was really needed?: No   Food Insecurity: Low Risk  (4/2/2025)    Food Insecurity     Within the past 12 months, did you worry that your food would run out before you got money to buy more?: No     Within the past 12 months, did the food you bought just not last and you didn t have money to get more?: No   Transportation Needs: Low Risk  (4/2/2025)    Transportation Needs     Within the past 12 months, has lack of transportation kept you from medical appointments, getting your medicines, non-medical meetings or appointments, work, or from getting  things that you need?: No   Physical Activity: Sufficiently Active (4/2/2025)    Exercise Vital Sign     Days of Exercise per Week: 3 days     Minutes of Exercise per Session: 60 min   Stress: Stress Concern Present (4/2/2025)    [employer] - Occupational Stress Questionnaire     Feeling of Stress : Rather much   Social Connections: Unknown (4/2/2025)    Social Connection and Isolation Panel [NHANES]     Frequency of Communication with Friends and Family: Not on file     Frequency of Social Gatherings with Friends and Family: Never     Attends Protestant Services: Not on file     Active Member of Clubs or Organizations: Not on file     Attends Club or Organization Meetings: Not on file     Marital Status: Not on file   Interpersonal Safety: Low Risk  (5/19/2025)    Interpersonal Safety     Do you feel physically and emotionally safe where you currently live?: Yes     Within the past 12 months, have you been hit, slapped, kicked or otherwise physically hurt by someone?: No     Within the past 12 months, have you been humiliated or emotionally abused in other ways by your partner or ex-partner?: No   Housing Stability: Low Risk  (4/2/2025)    Housing Stability     Do you have housing? : Yes     Are you worried about losing your housing?: No       Family History   Problem Relation Age of Onset    No Known Problems Mother     Hypertension Father         Most Recent Immunizations   Administered Date(s) Administered    COVID-19 12+ (Pfizer) 10/07/2024    COVID-19 MONOVALENT 12+ (Pfizer) 10/10/2024    COVID-19 Monovalent 12+ (Pfizer 2022) 04/13/2022    Flu, Unspecified 01/01/2022    Influenza (IIV3) PF 12/17/2014    Influenza Vaccine 18-64 (Flublok) 09/28/2023    Influenza Vaccine >6 months,quad, PF 11/23/2020    Influenza Vaccine Trivalent (FluBlok) 10/07/2024    Pneumococcal 23 valent 03/02/2012    RSV (Abrysvo) 12/11/2023    TD,PF 7+ (Tenivac) 02/20/2020    TDAP (Adacel,Boostrix) 04/02/2019  "   Tdap (Adult) Unspecified Formulation 01/23/2009        Wt Readings from Last 3 Encounters:   05/19/25 95.7 kg (210 lb 14.4 oz)   05/14/25 96.8 kg (213 lb 8 oz)   04/15/25 96 kg (211 lb 9.6 oz)        BP Readings from Last 6 Encounters:   05/19/25 (!) 142/78   05/14/25 136/80   05/14/25 (!) 140/83   04/15/25 138/76   04/07/25 128/76   10/07/24 120/70        Hemoglobin A1C   Date Value Ref Range Status   04/07/2025 5.9 (H) 0.0 - 5.6 % Final     Comment:     Normal <5.7%   Prediabetes 5.7-6.4%    Diabetes 6.5% or higher     Note: Adopted from ADA consensus guidelines.   05/24/2024 5.7 (H) 0.0 - 5.6 % Final     Comment:     Normal <5.7%   Prediabetes 5.7-6.4%    Diabetes 6.5% or higher     Note: Adopted from ADA consensus guidelines.   03/03/2023 5.7 (H) 0.0 - 5.6 % Final     Comment:     Normal <5.7%   Prediabetes 5.7-6.4%    Diabetes 6.5% or higher     Note: Adopted from ADA consensus guidelines.              PHYSICAL EXAM:    BP (!) 142/78   Pulse 58   Temp 98.1  F (36.7  C) (Oral)   Resp 22   Ht 1.854 m (6' 1\")   Wt 95.7 kg (210 lb 14.4 oz)   SpO2 98%   BMI 27.82 kg/m       General: Patient with no signs of distress    Examination of his throat reveals no space-occupying mass.  There is a small whitish bump that would indicate likely a benign substance underneath the surface of the tonsil as there is no sign of infection.  The area measures 1 to 2 mm at most.  It is only very slightly raised.  It is probably a relatively normal structure based on its appearance and the described lessening of the symptoms without any associated signs of inflammation space-occupying effects or other however we will keep all possibilities in mind.                          Answers submitted by the patient for this visit:  Depression / Anxiety Questionnaire (Submitted on 5/14/2025)  Chief Complaint: Chronic problems general questions HPI Form  Depression/Anxiety: Depression & Anxiety  Depression & Anxiety (Submitted on " 5/14/2025)  Chief Complaint: Chronic problems general questions HPI Form  Status since last visit:: medium  Anxiety since last: : medium  Other associated symptoms of depression:: No  Other associated symotome: : No  Significant life event: : No  Anxious:: No  Current substance use:: No  General Questionnaire (Submitted on 5/14/2025)  Chief Complaint: Chronic problems general questions HPI Form  How many servings of fruits and vegetables do you eat daily?: 2-3  On average, how many sweetened beverages do you drink each day (Examples: soda, juice, sweet tea, etc.  Do NOT count diet or artificially sweetened beverages)?: 2  How many minutes a day do you exercise enough to make your heart beat faster?: 30 to 60  How many days a week do you exercise enough to make your heart beat faster?: 5  How many days per week do you miss taking your medication?: 0  Questionnaire about: Chronic problems general questions HPI Form (Submitted on 5/14/2025)  Chief Complaint: Chronic problems general questions HPI Form

## 2025-06-03 ENCOUNTER — TRANSFERRED RECORDS (OUTPATIENT)
Dept: ADMINISTRATIVE | Facility: CLINIC | Age: 65
End: 2025-06-03
Payer: MEDICARE
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
## 2025-06-04 NOTE — PROCEDURES
[facility]   [address], [address], [place] [address]     Patient Name: Segundo Sellers  Gender:  Male  Exam Date: 06/03/2025 Visit Number:  [id number]  Age: 65 Years YOB: 1960  Attending MD: Cayla Carranza MD Medical Record#:  [id number]    Procedure: Colonoscopy   Indications: Previous adenomatous polyp(s)      Referring MD: Referral Self  Primary MD:      Segundo Dejesus MD  Medications: Admitting Medications:   0.9% Normal Saline at TKO   Intra Procedure Medications:   Patient received monitored anesthesia care.     Complications: No immediate complications  ______________________________________________________________________________  Procedure:   An examination of the heart and lungs was performed and found to be within acceptable limits.  .  The patient was therefore deemed a reasonable candidate for endoscopy and sedation.   The risks and benefits of the procedure were explained to the patient.After obtaining informed consent, the patient received monitored anesthesia care and I passed the scope   without difficulty via the rectum to the cecum.  The appendiceal orifice and ic valve were identified.  The scope was retroflexed during the examination  The quality of the prep was good  (Miralax/Gatorade/2 tablets Bisacodyl/Magnesium Citrate).    This was a complete examination throughout the entire colon.    Findings:    Polyp location: ascending colon.  Quantity: 3.  Size:  5 mm, 3 mm, 2 mm.  Polyp shape:  sessile.         Maneuver: polypectomy was performed with a cold snare and cold biopsy forceps.       Removal:  complete.  Retrieval: complete.  Bleeding: none.    Polyp location: rectum.  Quantity: 1.  Size: 2 mm.  Polyp shape: sessile.         Maneuver: polypectomy was performed with a  cold biopsy forceps.       Removal: complete.  Retrieval: complete.  Bleeding: none.    Diverticulosis.  Location: - descending colon - sigmoid.    Description:   moderate.    Hemorrhoids.  External hemorrhoids without bleeding.  Remainder of the exam is normal.    Impression:  Colorectal polyps  Personal history of colonic polyps  Diverticulosis of colon    Preliminary Plan:  The patient and their physician will receive a copy of the pathology report as well as pathology-based recommendations for future screening or surveillance.  Pathology Results:  A: COLON, ASCENDING, POLYPS:           1. Tubular adenomas (2) and benign schwann cell hamartoma (1)           2. Negative for high grade dysplasia           3. Per the colonoscopy report:               a. Polyp sizes: 2 mm, 3 mm and 5 mm               b. Resection: Complete               c. Retrieval: Complete           4. See comment      B: RECTUM, POLYP:           1. Hyperplastic polyp      COMMENTS  A. Schwann cell hamartomas are uncommon, generally small, isolated polyps, and are of no clinical significance. However, please contact us if the patient has multiple similar lesions or is known to have a systemic syndrome, as neurofibromas can mimic Schwann cell hamartomas.      MICROSCOPIC  A: Performed   B: Performed     Electronically signed by: Bryce Woodward MD    Interpreted at [facility], [address] [place] [id number]    Orders    Instruction(s)/Education:  Instruction/Education Timeframe Assessment   Colon Polyps  K63.5   Diverticulosis/Diverticulitis  K63.5   Hemorrhoids  K63.5       Final Plan:  Repeat colonoscopy in 5 years.    We will attempt to contact you at appropriate intervals via [place] mail.  We may not be able to find you or contact you at that time, therefore you should know that the responsibility for following our recommendation rests with you.  If you don't hear from us at the time your procedure is due, please contact our office to schedule an appointment.  If your contact information should change, please contact our office so that we can update your  record.      _Electronically signed by:___________________  Cayla Carranza MD                 06/03/2025    cc: Segundo Dejesus MD

## 2025-06-09 ENCOUNTER — VIRTUAL VISIT (OUTPATIENT)
Dept: PHARMACY | Facility: OTHER | Age: 65
End: 2025-06-09
Payer: COMMERCIAL

## 2025-06-09 DIAGNOSIS — J30.9 ALLERGIC RHINITIS, UNSPECIFIED SEASONALITY, UNSPECIFIED TRIGGER: ICD-10-CM

## 2025-06-09 DIAGNOSIS — J45.909 ASTHMA, UNSPECIFIED ASTHMA SEVERITY, UNSPECIFIED WHETHER COMPLICATED, UNSPECIFIED WHETHER PERSISTENT: ICD-10-CM

## 2025-06-09 DIAGNOSIS — M43.16 SPONDYLOLISTHESIS OF LUMBAR REGION: Primary | ICD-10-CM

## 2025-06-09 DIAGNOSIS — R39.9 LOWER URINARY TRACT SYMPTOMS: ICD-10-CM

## 2025-06-09 DIAGNOSIS — K59.00 CONSTIPATION, UNSPECIFIED CONSTIPATION TYPE: ICD-10-CM

## 2025-06-09 DIAGNOSIS — F32.1 CURRENT MODERATE EPISODE OF MAJOR DEPRESSIVE DISORDER WITHOUT PRIOR EPISODE (H): ICD-10-CM

## 2025-06-09 PROCEDURE — 99207 PR NO CHARGE LOS: CPT | Mod: 95 | Performed by: PHARMACIST

## 2025-06-09 RX ORDER — DOXAZOSIN 1 MG/1
1 TABLET ORAL AT BEDTIME
Qty: 30 TABLET | Refills: 1 | Status: SHIPPED | OUTPATIENT
Start: 2025-06-09

## 2025-06-09 RX ORDER — MELOXICAM 15 MG/1
15 TABLET ORAL DAILY
Qty: 30 TABLET | Refills: 1 | Status: SHIPPED | OUTPATIENT
Start: 2025-06-09

## 2025-06-09 RX ORDER — BUDESONIDE AND FORMOTEROL FUMARATE DIHYDRATE 160; 4.5 UG/1; UG/1
AEROSOL RESPIRATORY (INHALATION)
Qty: 20.4 G | Refills: 11 | Status: SHIPPED | OUTPATIENT
Start: 2025-06-09

## 2025-06-09 RX ORDER — NORTRIPTYLINE HYDROCHLORIDE 10 MG/1
10 CAPSULE ORAL AT BEDTIME
Qty: 30 CAPSULE | Refills: 1 | Status: SHIPPED | OUTPATIENT
Start: 2025-06-09

## 2025-06-09 NOTE — PATIENT INSTRUCTIONS
"Recommendations from today's MTM visit:                                                    MTM (medication therapy management) is a service provided by a clinical pharmacist designed to help you get the most of out of your medicines.   Today we reviewed what your medicines are for, how to know if they are working, that your medicines are safe and how to make your medicine regimen as easy as possible.      Continue Meloxicam 15 daily for back pain. Avoid additionals NSAIDs like Naproxen   Start Nortriptyline 10 mg at bedtime for mood and sleep. Continue with Tizanidine and Hydroxyzine as needed   Start Symbicort 160-4.5 mcg 1 puff twice daily + 1 puff as needed for shortness of breath up to 12 puffs per day.   Start Doxazosin 1 mg at bedtime for urinary symptoms       Follow-up: Return in about 9 weeks (around 8/11/2025) for Medication Management Pharmacist, using a video visit.    It was great speaking with you today.  I value your experience and would be very thankful for your time in providing feedback in our clinic survey. In the next few days, you may receive an email or text message from [employer] with a link to a survey related to your  clinical pharmacist.\"     To schedule another MTM appointment, please call the clinic directly or you may call the MTM scheduling line at [phone].    My Clinical Pharmacist's contact information:                                                      Please feel free to contact me with any questions or concerns you have.      Duc Estrada, PharmD  Medication Therapy Management (MTM) Pharmacist  [facility]      "
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
## 2025-06-16 ENCOUNTER — PATIENT OUTREACH (OUTPATIENT)
Dept: CARE COORDINATION | Facility: CLINIC | Age: 65
End: 2025-06-16
Payer: MEDICARE

## 2025-06-18 ASSESSMENT — ANXIETY QUESTIONNAIRES
1. FEELING NERVOUS, ANXIOUS, OR ON EDGE: SEVERAL DAYS
7. FEELING AFRAID AS IF SOMETHING AWFUL MIGHT HAPPEN: NOT AT ALL
3. WORRYING TOO MUCH ABOUT DIFFERENT THINGS: SEVERAL DAYS
IF YOU CHECKED OFF ANY PROBLEMS ON THIS QUESTIONNAIRE, HOW DIFFICULT HAVE THESE PROBLEMS MADE IT FOR YOU TO DO YOUR WORK, TAKE CARE OF THINGS AT HOME, OR GET ALONG WITH OTHER PEOPLE: NOT DIFFICULT AT ALL
7. FEELING AFRAID AS IF SOMETHING AWFUL MIGHT HAPPEN: NOT AT ALL
6. BECOMING EASILY ANNOYED OR IRRITABLE: NOT AT ALL
4. TROUBLE RELAXING: SEVERAL DAYS
2. NOT BEING ABLE TO STOP OR CONTROL WORRYING: SEVERAL DAYS
GAD7 TOTAL SCORE: 4
8. IF YOU CHECKED OFF ANY PROBLEMS, HOW DIFFICULT HAVE THESE MADE IT FOR YOU TO DO YOUR WORK, TAKE CARE OF THINGS AT HOME, OR GET ALONG WITH OTHER PEOPLE?: NOT DIFFICULT AT ALL
5. BEING SO RESTLESS THAT IT IS HARD TO SIT STILL: NOT AT ALL
GAD7 TOTAL SCORE: 4
GAD7 TOTAL SCORE: 4

## 2025-06-22 ASSESSMENT — PATIENT HEALTH QUESTIONNAIRE - PHQ9
SUM OF ALL RESPONSES TO PHQ QUESTIONS 1-9: 10
10. IF YOU CHECKED OFF ANY PROBLEMS, HOW DIFFICULT HAVE THESE PROBLEMS MADE IT FOR YOU TO DO YOUR WORK, TAKE CARE OF THINGS AT HOME, OR GET ALONG WITH OTHER PEOPLE: NOT DIFFICULT AT ALL
SUM OF ALL RESPONSES TO PHQ QUESTIONS 1-9: 10

## 2025-06-23 ENCOUNTER — OFFICE VISIT (OUTPATIENT)
Dept: FAMILY MEDICINE | Facility: CLINIC | Age: 65
End: 2025-06-23
Payer: MEDICARE

## 2025-06-23 VITALS
WEIGHT: 212 LBS | HEART RATE: 59 BPM | HEIGHT: 73 IN | SYSTOLIC BLOOD PRESSURE: 138 MMHG | BODY MASS INDEX: 28.1 KG/M2 | OXYGEN SATURATION: 98 % | RESPIRATION RATE: 20 BRPM | DIASTOLIC BLOOD PRESSURE: 76 MMHG

## 2025-06-23 DIAGNOSIS — F90.0 ATTENTION-DEFICIT HYPERACTIVITY DISORDER, PREDOMINANTLY INATTENTIVE TYPE: ICD-10-CM

## 2025-06-23 DIAGNOSIS — J39.2 LESION OF THROAT: Primary | ICD-10-CM

## 2025-06-23 DIAGNOSIS — G89.29 OTHER CHRONIC PAIN: ICD-10-CM

## 2025-06-23 DIAGNOSIS — M48.061 SPINAL STENOSIS OF LUMBAR REGION WITHOUT NEUROGENIC CLAUDICATION: ICD-10-CM

## 2025-06-23 PROCEDURE — 99214 OFFICE O/P EST MOD 30 MIN: CPT | Performed by: FAMILY MEDICINE

## 2025-06-23 PROCEDURE — 3075F SYST BP GE 130 - 139MM HG: CPT | Performed by: FAMILY MEDICINE

## 2025-06-23 PROCEDURE — 3078F DIAST BP <80 MM HG: CPT | Performed by: FAMILY MEDICINE

## 2025-06-23 PROCEDURE — 1126F AMNT PAIN NOTED NONE PRSNT: CPT | Performed by: FAMILY MEDICINE

## 2025-06-23 RX ORDER — HYDROXYZINE HYDROCHLORIDE 25 MG/1
25 TABLET, FILM COATED ORAL 3 TIMES DAILY PRN
Qty: 30 TABLET | Refills: 3 | Status: SHIPPED | OUTPATIENT
Start: 2025-06-23

## 2025-06-23 RX ORDER — METHYLPREDNISOLONE 4 MG/1
4 TABLET ORAL
COMMUNITY
Start: 2025-06-16 | End: 2025-06-23

## 2025-06-23 RX ORDER — DEXTROAMPHETAMINE SACCHARATE, AMPHETAMINE ASPARTATE MONOHYDRATE, DEXTROAMPHETAMINE SULFATE AND AMPHETAMINE SULFATE 3.75; 3.75; 3.75; 3.75 MG/1; MG/1; MG/1; MG/1
15 CAPSULE, EXTENDED RELEASE ORAL DAILY
Qty: 30 CAPSULE | Refills: 0 | Status: SHIPPED | OUTPATIENT
Start: 2025-06-23

## 2025-06-23 ASSESSMENT — PAIN SCALES - GENERAL: PAINLEVEL_OUTOF10: NO PAIN (0)

## 2025-06-23 NOTE — PROGRESS NOTES
"Segundo Sellers  /76   Pulse 59   Resp 20   Ht 1.854 m (6' 1\")   Wt 96.2 kg (212 lb)   SpO2 98%   BMI 27.97 kg/m       Assessment/Plan:                Segundo was seen today for recheck medication.    Diagnoses and all orders for this visit:    Lesion of throat  -     Adult ENT  Referral; Future    Spinal stenosis of lumbar region without neurogenic claudication  -     hydrOXYzine HCl (ATARAX) 25 MG tablet; Take 1 tablet (25 mg) by mouth 3 times daily as needed for itching or other (withdrawal).  -     tiZANidine (ZANAFLEX) 4 MG tablet; One tab bedtime, may repeat after four hours if needed    Attention-deficit hyperactivity disorder, predominantly inattentive type  -     amphetamine-dextroamphetamine (ADDERALL XR) 15 MG 24 hr capsule; Take 1 capsule (15 mg) by mouth daily.    Other chronic pain  -     tiZANidine (ZANAFLEX) 4 MG tablet; One tab bedtime, may repeat after four hours if needed         DISCUSSION  See discussion below.    Minimize medications.  For mental health continues to take hydroxyzine to manage anxiety and Adderall to help with depression symptoms and attention deficit disorder.    For management of pain tizanidine and NSAIDs as prior.    For the throat concern described in more detail below referral to ENT.  Subjective:     HPI:    Segundo Sellers is a 65 year old male is here today for follow-up.    He has chronic back pain.  He had been on oxycodone but stopped all oxycodone in mid May.  Continues to struggle with back pain is working with spine specialist to try and help better manage pain.    Has significant mental health concerns including underlying ADHD, insomnia, major depression, ongoing grief compounded by dealing with chronic pain.    When he was at the pain clinic he had multiple medication changes.  He has elected to stop most of the medications.  He has returned to taking some other previous medications.  This has created a lot of complexity and confusion " about what he is taking and for how long.    Patient finds benefit from taking Adderall.  This seems to help with depression and focus.  He has had significant side effects from most other SSRI and SNRI medications we have tried leading up to this point which have included sertraline escitalopram Wellbutrin.    He continues to struggle with insomnia.    Had a lesion in his throat that he has been dealing with for several months.  Initially seem to be most consistent with a mild infection.  Lesion has become apparent in the left tonsillar region that appears to be a's spot of mild irritation.  He was treated with antibiotics empirically at 1 point may be cause some help in relieving discomfort but the lesion is still present we did discuss referral to ENT.    He states he is taking Adderall.  He feels this helps mood and focus.  We discussed continuation of the medication for these purposes.  We will continue 15 mg extended release once daily and arrange for short-term follow-up.  He will continue to take his hydroxyzine as it is not causing urinary symptoms or oversedation.  He will use this at night to help manage anxiety and insomnia.  He will not take any other mental health medications at this time.    For management of pain symptoms he will utilize tizanidine at night to help manage pain.  He will otherwise work with his spine specialist on medication management beyond NSAIDs which he is currently taking.        ROS:  Complete review of systems is obtained.  Other than the specific considerations noted above complete review of systems is negative.          Objective:   Medications:  Current Outpatient Medications   Medication Sig Dispense Refill    albuterol (PROAIR HFA) 90 mcg/actuation inhaler Inhale 1-2 puffs into the lungs every 4 hours as needed      amphetamine-dextroamphetamine (ADDERALL XR) 15 MG 24 hr capsule Take 1 capsule (15 mg) by mouth daily. 30 capsule 0    budesonide-formoterol  (SYMBICORT/BREYNA) 160-4.5 MCG/ACT inhaler Inhale 1 puff twice daily plus 1 puff as needed. May use up to 12 puffs per day. 20.4 g 11    fluocinonide (LIDEX) 0.05 % external solution       fluticasone (FLONASE) 50 MCG/ACT nasal spray USE 2 SPRAYS IN EACH NOSTIL DAILY 16 g 4    hydrOXYzine HCl (ATARAX) 25 MG tablet Take 1 tablet (25 mg) by mouth 3 times daily as needed for itching or other (withdrawal). 30 tablet 3    meloxicam (MOBIC) 15 MG tablet Take 1 tablet (15 mg) by mouth daily. 30 tablet 1    montelukast (SINGULAIR) 10 mg tablet Take 10 mg by mouth daily      senna-docusate (SENOKOT-S/PERICOLACE) 8.6-50 MG tablet Take 2 tablets by mouth 2 times daily 60 tablet 0    sildenafil (VIAGRA) 100 MG tablet Take 1 tablet (100 mg) by mouth daily as needed. 30 tablet 1    tiZANidine (ZANAFLEX) 4 MG tablet One tab bedtime, may repeat after four hours if needed 60 tablet 3    zolpidem (AMBIEN) 5 MG tablet Take 1 tablet (5 mg) by mouth nightly as needed for sleep. 30 tablet 1     No current facility-administered medications for this visit.        Allergies:   No Known Allergies     Social History     Socioeconomic History    Marital status:      Spouse name: Not on file    Number of children: Not on file    Years of education: Not on file    Highest education level: Not on file   Occupational History    Not on file   Tobacco Use    Smoking status: Never     Passive exposure: Never    Smokeless tobacco: Former     Types: Chew   Vaping Use    Vaping status: Never Used   Substance and Sexual Activity    Alcohol use: Yes     Alcohol/week: 2.0 standard drinks of alcohol     Types: 2 Standard drinks or equivalent per week     Comment: 4 per week    Drug use: No    Sexual activity: Yes     Partners: Female     Comment:    Other Topics Concern    Not on file   Social History Narrative    Not on file     Social Drivers of Health     Financial Resource Strain: Low Risk  (4/2/2025)    Financial Resource Strain      Within the past 12 months, have you or your family members you live with been unable to get utilities (heat, electricity) when it was really needed?: No   Food Insecurity: Low Risk  (4/2/2025)    Food Insecurity     Within the past 12 months, did you worry that your food would run out before you got money to buy more?: No     Within the past 12 months, did the food you bought just not last and you didn t have money to get more?: No   Transportation Needs: Low Risk  (4/2/2025)    Transportation Needs     Within the past 12 months, has lack of transportation kept you from medical appointments, getting your medicines, non-medical meetings or appointments, work, or from getting things that you need?: No   Physical Activity: Sufficiently Active (4/2/2025)    Exercise Vital Sign     Days of Exercise per Week: 3 days     Minutes of Exercise per Session: 60 min   Stress: Stress Concern Present (4/2/2025)    [employer] [place] [employer] - Occupational Stress Questionnaire     Feeling of Stress : Rather much   Social Connections: Unknown (4/2/2025)    Social Connection and Isolation Panel [NHANES]     Frequency of Communication with Friends and Family: Not on file     Frequency of Social Gatherings with Friends and Family: Never     Attends [employer] Services: Not on file     Active Member of Clubs or Organizations: Not on file     Attends Club or Organization Meetings: Not on file     Marital Status: Not on file   Interpersonal Safety: Low Risk  (5/19/2025)    Interpersonal Safety     Do you feel physically and emotionally safe where you currently live?: Yes     Within the past 12 months, have you been hit, slapped, kicked or otherwise physically hurt by someone?: No     Within the past 12 months, have you been humiliated or emotionally abused in other ways by your partner or ex-partner?: No   Housing Stability: Low Risk  (4/2/2025)    Housing Stability     Do you have housing? : Yes     Are you worried about  "losing your housing?: No       Family History   Problem Relation Age of Onset    No Known Problems Mother     Hypertension Father         Most Recent Immunizations   Administered Date(s) Administered    COVID-19 12+ (Pfizer) 10/07/2024    COVID-19 MONOVALENT 12+ (Pfizer) 10/10/2024    COVID-19 Monovalent 12+ (Pfizer 2022) 04/13/2022    Flu, Unspecified 01/01/2022    Influenza (IIV3) PF 12/17/2014    Influenza Vaccine 18-64 (Flublok) 09/28/2023    Influenza Vaccine >6 months,quad, PF 11/23/2020    Influenza Vaccine Trivalent (FluBlok) 10/07/2024    Pneumococcal 23 valent 03/02/2012    RSV (Abrysvo) 12/11/2023    TD,PF 7+ (Tenivac) 02/20/2020    TDAP (Adacel,Boostrix) 04/02/2019    Tdap (Adult) Unspecified Formulation 01/23/2009        Wt Readings from Last 3 Encounters:   06/23/25 96.2 kg (212 lb)   05/19/25 95.7 kg (210 lb 14.4 oz)   05/14/25 96.8 kg (213 lb 8 oz)        BP Readings from Last 6 Encounters:   06/23/25 138/76   05/19/25 (!) 142/78   05/14/25 136/80   05/14/25 (!) 140/83   04/15/25 138/76   04/07/25 128/76        Hemoglobin A1C   Date Value Ref Range Status   04/07/2025 5.9 (H) 0.0 - 5.6 % Final     Comment:     Normal <5.7%   Prediabetes 5.7-6.4%    Diabetes 6.5% or higher     Note: Adopted from ADA consensus guidelines.   05/24/2024 5.7 (H) 0.0 - 5.6 % Final     Comment:     Normal <5.7%   Prediabetes 5.7-6.4%    Diabetes 6.5% or higher     Note: Adopted from ADA consensus guidelines.   03/03/2023 5.7 (H) 0.0 - 5.6 % Final     Comment:     Normal <5.7%   Prediabetes 5.7-6.4%    Diabetes 6.5% or higher     Note: Adopted from ADA consensus guidelines.              PHYSICAL EXAM:    /76   Pulse 59   Resp 20   Ht 1.854 m (6' 1\")   Wt 96.2 kg (212 lb)   SpO2 98%   BMI 27.97 kg/m       General: Patient is alert no signs of physical distress, is frustrated and somewhat emotional due to the ongoing concerns as mentioned above.    Examination of his throat reveals that there is a spot of " irritation somewhat irregular in shape on the left tonsillar region.  It is flat, there is some redness seen at the somewhat irregular border.  There is a somewhat whiteish coating over the top.                          Answers submitted by the patient for this visit:  Patient Health Questionnaire (Submitted on 6/22/2025)  If you checked off any problems, how difficult have these problems made it for you to do your work, take care of things at home, or get along with other people?: Not difficult at all  PHQ9 TOTAL SCORE: 10  Patient Health Questionnaire (G7) (Submitted on 6/18/2025)  ANTONIO 7 TOTAL SCORE: 4  Depression / Anxiety Questionnaire (Submitted on 6/18/2025)  Chief Complaint: Chronic problems general questions HPI Form  Depression/Anxiety: Depression & Anxiety  Depression & Anxiety (Submitted on 6/18/2025)  Chief Complaint: Chronic problems general questions HPI Form  Status since last visit:: better  Anxiety since last: : better  Other associated symptoms of depression:: No  Other associated symotome: : No  Significant life event: : No  Anxious:: No  Current substance use:: No  Back Pain Visit Questionnaire (Submitted on 6/18/2025)  Your back pain is: chronic  Chronic or Recurring Back Pain Visit Questionnaire (Submitted on 6/18/2025)  Where is your back pain located? : right lower back, left lower back  How would you describe your back pain? : dull ache  Where does your back pain spread? : right buttocks, left buttocks  Since you noticed your back pain, how has it changed? : unchanged  Does your back pain interfere with your job?: No  General Questionnaire (Submitted on 6/18/2025)  Chief Complaint: Chronic problems general questions HPI Form  How many servings of fruits and vegetables do you eat daily?: 4 or more  On average, how many sweetened beverages do you drink each day (Examples: soda, juice, sweet tea, etc.  Do NOT count diet or artificially sweetened beverages)?: 1  How many minutes a day do you  exercise enough to make your heart beat faster?: 60 or more  How many days a week do you exercise enough to make your heart beat faster?: 5  How many days per week do you miss taking your medication?: 0  Questionnaire about: Chronic problems general questions HPI Form (Submitted on 6/18/2025)  Chief Complaint: Chronic problems general questions HPI Form

## 2025-06-24 ENCOUNTER — PATIENT OUTREACH (OUTPATIENT)
Dept: CARE COORDINATION | Facility: CLINIC | Age: 65
End: 2025-06-24

## 2025-06-24 ENCOUNTER — TRANSFERRED RECORDS (OUTPATIENT)
Dept: HEALTH INFORMATION MANAGEMENT | Facility: CLINIC | Age: 65
End: 2025-06-24

## 2025-07-07 ENCOUNTER — TRANSFERRED RECORDS (OUTPATIENT)
Dept: HEALTH INFORMATION MANAGEMENT | Facility: CLINIC | Age: 65
End: 2025-07-07
Payer: MEDICARE

## 2025-07-11 ENCOUNTER — TRANSFERRED RECORDS (OUTPATIENT)
Dept: HEALTH INFORMATION MANAGEMENT | Facility: CLINIC | Age: 65
End: 2025-07-11
Payer: MEDICARE

## 2025-07-14 ENCOUNTER — OFFICE VISIT (OUTPATIENT)
Dept: FAMILY MEDICINE | Facility: CLINIC | Age: 65
End: 2025-07-14
Payer: MEDICARE

## 2025-07-14 VITALS
HEART RATE: 82 BPM | HEIGHT: 73 IN | WEIGHT: 213 LBS | OXYGEN SATURATION: 97 % | DIASTOLIC BLOOD PRESSURE: 90 MMHG | TEMPERATURE: 98.4 F | RESPIRATION RATE: 16 BRPM | BODY MASS INDEX: 28.23 KG/M2 | SYSTOLIC BLOOD PRESSURE: 134 MMHG

## 2025-07-14 DIAGNOSIS — M54.50 CHRONIC BILATERAL LOW BACK PAIN WITHOUT SCIATICA: ICD-10-CM

## 2025-07-14 DIAGNOSIS — G89.29 CHRONIC BILATERAL LOW BACK PAIN WITHOUT SCIATICA: ICD-10-CM

## 2025-07-14 DIAGNOSIS — Z01.818 PREOP GENERAL PHYSICAL EXAM: Primary | ICD-10-CM

## 2025-07-14 DIAGNOSIS — J45.909 MODERATE ASTHMA WITHOUT COMPLICATION, UNSPECIFIED WHETHER PERSISTENT: ICD-10-CM

## 2025-07-14 PROCEDURE — 3080F DIAST BP >= 90 MM HG: CPT | Performed by: FAMILY MEDICINE

## 2025-07-14 PROCEDURE — 99214 OFFICE O/P EST MOD 30 MIN: CPT | Performed by: FAMILY MEDICINE

## 2025-07-14 PROCEDURE — 3075F SYST BP GE 130 - 139MM HG: CPT | Performed by: FAMILY MEDICINE

## 2025-07-14 PROCEDURE — G2211 COMPLEX E/M VISIT ADD ON: HCPCS | Performed by: FAMILY MEDICINE

## 2025-07-14 RX ORDER — ACETAMINOPHEN 500 MG
1000 TABLET ORAL EVERY 8 HOURS PRN
COMMUNITY

## 2025-07-14 NOTE — PROGRESS NOTES
Preoperative Evaluation  [facility]  [address]  [place] [address]  Phone: [phone]  Fax: [phone]  Primary Provider: Segundo Dejesus MD, MD  Pre-op Performing Provider: Reyna Cee MD  Jul 14, 2025 7/11/2025   Surgical Information   What procedure is being done? Back surgery hardware removal from previous fusion of L1- S5   Facility or Hospital where procedure/surgery will be performed: [facility]   Who is doing the procedure / surgery? Dr Desir   Date of surgery / procedure: TBD   Time of surgery / procedure: TBD   Where do you plan to recover after surgery? at home with family     Fax number for surgical facility: [phone]    Assessment & Plan     The proposed surgical procedure is considered INTERMEDIATE risk.    (Z01.818) Preop general physical exam  (primary encounter diagnosis)  Comment: pt will have Back surgery hardware removal from previous fusion of L1- S5  Plan: cleared for the planned surgery    (M54.50,  G89.29) Chronic bilateral low back pain without sciatica  Comment: pt has chronic low back pain. He had back infusion one year ago and helped, then back pain recurred.   Plan: will have Back surgery hardware removal from previous fusion of L1- S5    (J45.909) Moderate asthma without complication, unspecified whether persistent  Comment: he is doing well, asthma good controlled with inhalers. Denies cough, wheezing, sob and fever  Plan: continue current inhalers.     The longitudinal plan of care for the diagnosis(es)/condition(s) as documented were addressed during this visit. Due to the added complexity in care, I will continue to support Segundo in the subsequent management and with ongoing continuity of care.          - No identified additional risk factors other than previously addressed    Antiplatelet or Anticoagulation Medication Instructions   - We reviewed the medication list  and the patient is not on an antiplatelet or anticoagulation medications.    Additional Medication Instructions   - diclofenac (Voltaren): DO NOT TAKE for 3 days for high bleeding risk or PRN use.   - meloxicam (Mobic): DO NOT TAKE 10 days before surgery.     Recommendation  Approval given to proceed with proposed procedure, without further diagnostic evaluation.        Becca Raymond is a 65 year old, presenting for the following:  Pre-Op Exam          7/14/2025    12:48 PM   Additional Questions   Roomed by Gen DEENA CMA     HPI: pt has chronic low back pain. He had back infusion one year ago and helped, then back pain recurred. He will have Back surgery hardware removal from previous fusion of L1- S5          7/11/2025   Pre-Op Questionnaire   Have you ever had a heart attack or stroke? No   Have you ever had surgery on your heart or blood vessels, such as a stent placement, a coronary artery bypass, or surgery on an artery in your head, neck, heart, or legs? No   Do you have chest pain with activity? No   Do you have a history of heart failure? No   Do you currently have a cold, bronchitis or symptoms of other infection? No   Do you have a cough, shortness of breath, or wheezing? No   Do you or anyone in your family have previous history of blood clots? No   Do you or does anyone in your family have a serious bleeding problem such as prolonged bleeding following surgeries or cuts? No   Have you ever had problems with anemia or been told to take iron pills? No   Have you had any abnormal blood loss such as black, tarry or bloody stools? No   Have you ever had a blood transfusion? No   Are you willing to have a blood transfusion if it is medically needed before, during, or after your surgery? Yes   Have you or any of your relatives ever had problems with anesthesia? No   Do you have sleep apnea, excessive snoring or daytime drowsiness? No   Do you have any artifical heart valves or other implanted medical  devices like a pacemaker, defibrillator, or continuous glucose monitor? No   Do you have artificial joints? No   Are you allergic to latex? No     Advance Care Planning    Discussed advance care planning with patient; however, patient declined at this time.    Preoperative Review of    reviewed - no record of controlled substances prescribed.      Status of Chronic Conditions:  ASTHMA - Patient has a longstanding history of moderate-severe Asthma . Patient has been doing well overall noting NO SYMPTOMS and continues on medication regimen consisting of inhalers without adverse reactions or side effects.     DEPRESSION - Patient has a long history of Depression of moderate severity requiring medication for control with recent symptoms being stable..Current symptoms of depression include none.     Patient Active Problem List    Diagnosis Date Noted    Spondylolisthesis of lumbar region 06/11/2024     Priority: Medium    Dyslipidemia 10/29/2021     Priority: Medium     Formatting of this note might be different from the original.  Created by Conversion      Pelvic and perineal pain 04/22/2020     Priority: Medium    Benign neoplasm of sigmoid colon 03/31/2020     Priority: Medium    Diverticular disease of large intestine 03/27/2020     Priority: Medium    Polyp of colon 03/27/2020     Priority: Medium    Current moderate episode of major depressive disorder without prior episode (H) 03/16/2020     Priority: Medium    Slowing of urinary stream 02/28/2020     Priority: Medium    Abdominal pain 02/28/2020     Priority: Medium    Lower urinary tract symptoms 02/28/2020     Priority: Medium    Asthma      Priority: Medium     Created by Conversion        Allergic Rhinitis      Priority: Medium     Created by Conversion  Replacement Utility updated for latest IMO load        Eczema      Priority: Medium     Created by Conversion        Dyslipidemia      Priority: Medium     Created by Conversion        Benign Prostatic  Hypertrophy      Priority: Medium     Created by Conversion        Lethargy      Priority: Medium     Created by Conversion        Impaired Fasting Glucose      Priority: Medium     Created by Conversion          Past Medical History:   Diagnosis Date    Abdominal pain 02/28/2020    Allergic rhinitis     Created by Conversion Replacement Utility updated for latest IMO load     Asthma     Created by Conversion     Benign Prostatic Hypertrophy     Created by Conversion     Current moderate episode of major depressive disorder without prior episode (H) 03/16/2020    Dyslipidemia 10/29/2021    Formatting of this note might be different from the original. Created by Conversion    Dyslipidemia     Created by Conversion     Eczema     Created by Conversion     Lethargy     Created by Conversion     Lower urinary tract symptoms 02/28/2020    Pelvic and perineal pain 04/22/2020    Slowing of urinary stream 02/28/2020     Past Surgical History:   Procedure Laterality Date    ARTHROSCOPY KNEE Left     BACK SURGERY  January 11, 2023    Fusion    DAVINCI XI HERNIORRHAPHY INGUINAL Right 08/02/2023    Procedure: HERNIORRHAPHY, INGUINAL, ROBOT-ASSISTED, LAPAROSCOPIC, USING DA AVEL XI;  Surgeon: Neftaly Rm MD;  Location: [facility] OR    FUSION, SPINE, LUMBAR, 1 LEVEL, COMBINED ANTERIOR AND POSTERIOR APPROACHES, W/INST, USING OTS N/A 06/11/2024    Procedure: LUMBAR 5 - SACRAL 1 ANTERIOR LUMBAR INTERBODY FUSION, POSTERIOR INSTRUMENTED FUSION WITH O ARM AND STEALTH NAVIGATION;  Surgeon: Wilfrido Desir MD;  [facility]    SURGICAL EXPOSURE, ANTERIOR APPROACH, W/WOUND CLOSURE, FOR LUMBAR SPINE SURGERY, BY GENERAL SURGERY N/A 06/11/2024    Procedure: SURGICAL EXPOSURE, ANTERIOR APPROACH, WITH WOUND CLOSURE, FOR LUMBAR SPINE SURGERY, BY GENERAL SURGERY;  Surgeon: Wilfrido Desir MD;  Location: [facility]     Current Outpatient Medications   Medication Sig Dispense  Refill    acetaminophen (TYLENOL) 500 MG tablet Take 1,000 mg by mouth every 8 hours as needed for mild pain.      albuterol (PROAIR HFA) 90 mcg/actuation inhaler Inhale 1-2 puffs into the lungs every 4 hours as needed      amphetamine-dextroamphetamine (ADDERALL XR) 15 MG 24 hr capsule Take 1 capsule (15 mg) by mouth daily. 30 capsule 0    budesonide-formoterol (SYMBICORT/BREYNA) 160-4.5 MCG/ACT inhaler Inhale 1 puff twice daily plus 1 puff as needed. May use up to 12 puffs per day. 20.4 g 11    diclofenac (VOLTAREN) 50 MG EC tablet       fluocinonide (LIDEX) 0.05 % external solution       fluticasone (FLONASE) 50 MCG/ACT nasal spray USE 2 SPRAYS IN EACH NOSTIL DAILY 16 g 4    hydrOXYzine HCl (ATARAX) 25 MG tablet Take 1 tablet (25 mg) by mouth 3 times daily as needed for itching or other (withdrawal). 30 tablet 3    meloxicam (MOBIC) 15 MG tablet Take 1 tablet (15 mg) by mouth daily. 30 tablet 1    montelukast (SINGULAIR) 10 mg tablet Take 10 mg by mouth daily      senna-docusate (SENOKOT-S/PERICOLACE) 8.6-50 MG tablet Take 2 tablets by mouth 2 times daily 60 tablet 0    sildenafil (VIAGRA) 100 MG tablet Take 1 tablet (100 mg) by mouth daily as needed. 30 tablet 1    tiZANidine (ZANAFLEX) 4 MG tablet One tab bedtime, may repeat after four hours if needed 60 tablet 3    zolpidem (AMBIEN) 5 MG tablet Take 1 tablet (5 mg) by mouth nightly as needed for sleep. 30 tablet 1       No Known Allergies     Social History     Tobacco Use    Smoking status: Never     Passive exposure: Never    Smokeless tobacco: Never   Substance Use Topics    Alcohol use: Yes     Alcohol/week: 2.0 standard drinks of alcohol     Types: 2 Standard drinks or equivalent per week     Comment: 5 a week     Family History   Problem Relation Age of Onset    No Known Problems Mother     Hypertension Father      History   Drug Use No             Review of Systems  Constitutional, HEENT, cardiovascular, pulmonary, GI, , musculoskeletal, neuro, skin,  "endocrine and psych systems are negative, except as otherwise noted.    Objective    BP (!) 142/94 (BP Location: Right arm, Patient Position: Sitting, Cuff Size: Adult Large)   Pulse 82   Temp 98.4  F (36.9  C) (Oral)   Resp 16   Ht 1.842 m (6' 0.5\")   Wt 96.6 kg (213 lb)   SpO2 97%   BMI 28.49 kg/m     Estimated body mass index is 28.49 kg/m  as calculated from the following:    Height as of this encounter: 1.842 m (6' 0.5\").    Weight as of this encounter: 96.6 kg (213 lb).  Physical Exam  GENERAL: alert and no distress  EYES: Eyes grossly normal to inspection, PERRL and conjunctivae and sclerae normal  HENT: ear canals and TM's normal, nose and mouth without ulcers or lesions  NECK: no adenopathy, no asymmetry, masses, or scars  RESP: lungs clear to auscultation - no rales, rhonchi or wheezes  CV: regular rate and rhythm, normal S1 S2, no S3 or S4, no murmur, click or rub, no peripheral edema  ABDOMEN: soft, nontender, no hepatosplenomegaly, no masses and bowel sounds normal  MS: no gross musculoskeletal defects noted, no edema  SKIN: no suspicious lesions or rashes. Patches of bruise on bilateral arms  NEURO: Normal strength and tone, mentation intact and speech normal  PSYCH: mentation appears normal, affect normal/bright    Recent Labs   Lab Test 04/07/25  0842   HGB 14.5         POTASSIUM 4.6   CR 0.94   A1C 5.9*        Diagnostics  No labs were ordered during this visit. Lab reviewed at 4/7 normal cbc and cmp.   No EKG required, no history of coronary heart disease, significant arrhythmia, peripheral arterial disease or other structural heart disease.    Revised Cardiac Risk Index (RCRI)  The patient has the following serious cardiovascular risks for perioperative complications:   - No serious cardiac risks = 0 points     RCRI Interpretation: 0 points: Class I (very low risk - 0.4% complication rate)         Signed Electronically by: Reyna Cee MD  A copy of this evaluation report is " provided to the requesting physician.

## 2025-07-14 NOTE — PATIENT INSTRUCTIONS
How to Take Your Medication Before Surgery  Preoperative Medication Instructions   Antiplatelet or Anticoagulation Medication Instructions   - We reviewed the medication list and the patient is not on an antiplatelet or anticoagulation medications.    Additional Medication Instructions   - Herbal medications and vitamins: DO NOT TAKE 14 days prior to surgery.   - diclofenac (Voltaren): DO NOT TAKE for 3 days for high bleeding risk or PRN use.   - meloxicam (Mobic): DO NOT TAKE 10 days before surgery.        Patient Education   Preparing for Your Surgery  For Adults  Getting started  In most cases, a nurse will call to review your health history and instructions. They will give you an arrival time based on your scheduled surgery time. Please be ready to share:  Your doctor's clinic name and phone number  Your medical, surgical, and anesthesia history  A list of allergies and sensitivities  A list of medicines, including herbal treatments and over-the-counter drugs  Whether the patient has a legal guardian (ask how to send us the papers in advance)  Note: You may not receive a call if you were seen at our PAC (Preoperative Assessment Center).  Please tell us if you're pregnant--or if there's any chance you might be pregnant. Some surgeries may injure a fetus (unborn baby), so they require a pregnancy test. Surgeries that are safe for a fetus don't always need a test, and you can choose whether to have one.   Preparing for surgery  Within 10 to 30 days of surgery: Have a pre-op exam (sometimes called an H&P, or History and Physical). This can be done at a clinic or pre-operative center.  If you're having a , you may not need this exam. Talk to your care team.  At your pre-op exam, talk to your care team about all medicines you take. (This includes CBD oil and any drugs, such as THC, marijuana, and other forms of cannabis.) If you need to stop any medicine before surgery, ask when to start taking it  again.  This is for your safety. Many medicines and drugs can make you bleed too much during surgery. Some change how well surgery (anesthesia) drugs work.  Call your insurance company to let them know you're having surgery. (If you don't have insurance, call [phone].)  Call your clinic if there's any change in your health. This includes a scrape or scratch near the surgery site, or any signs of a cold (sore throat, runny nose, cough, rash, fever).  Eating and drinking guidelines  For your safety: Unless your surgeon tells you otherwise, follow the guidelines below.  Eat and drink as normal until 8 hours before you arrive for surgery. After that, no food or milk. You can spit out gum when you arrive.  Drink clear liquids until 2 hours before you arrive. These are liquids you can see through, like water, Gatorade, and Propel Water. They also include plain black coffee and tea (no cream or milk).  No alcohol for 24 hours before you arrive. The night before surgery, stop any drinks that contain THC.  If your care team tells you to take medicine on the morning of surgery, it's okay to take it with a sip of water. No other medicines or drugs are allowed (including CBD oil)--follow your care team's instructions.  If you have questions the day of surgery, call your hospital or surgery center.   Preventing infection  Shower or bathe the night before and the morning of surgery. Follow the instructions your clinic gave you. (If no instructions, use regular soap.)  Don't shave or clip hair near your surgery site. We'll remove the hair if needed.  Don't smoke or vape the morning of surgery. No chewing tobacco for 6 hours before you arrive. A nicotine patch is okay. You may spit out nicotine gum when you arrive.  For some surgeries, the surgeon will tell you to fully quit smoking and nicotine.  We will make every effort to keep you safe from infection. We will:  Clean our hands often with soap and water (or an alcohol-based  hand rub).  Clean the skin at your surgery site with a special soap that kills germs.  Give you a special gown to keep you warm. (Cold raises the risk of infection.)  Wear hair covers, masks, gowns, and gloves during surgery.  Give antibiotic medicine, if prescribed. Not all surgeries need this medicine.  What to bring on the day of surgery  Photo ID and insurance card  Copy of your health care directive, if you have one  Glasses and hearing aids (bring cases)  You can't wear contacts during surgery  Inhaler and eye drops, if you use them (tell us about these when you arrive)  CPAP machine or breathing device, if you use them  A few personal items, if spending the night  If you have . . .  A pacemaker, ICD (cardiac defibrillator), or other implant: Bring the ID card.  An implanted stimulator: Bring the remote control.  A legal guardian: Bring a copy of the certified (court-stamped) guardianship papers.  Please remove any jewelry, including body piercings. Leave jewelry and other valuables at home.  If you're going home the day of surgery  You must have a support person drive you home. They should stay with you overnight, and they may need to help with your self-care.  If you don't have a support person, please tells us as soon as possible. We can help.  After surgery  If it's hard to control your pain or you need more pain medicine, please call your surgeon's office.  Questions?   If you have any questions for your care team, list them here:   ____________________________________________________________________________________________________________________________________________________________________________________________________________________________________________________________  For informational purposes only. Not to replace the advice of your health care provider. Copyright   2003, 2019 [facility]. All rights reserved. Clinically reviewed by Xavier Moran MD. [employer] [id number] - REV  02/25.

## 2025-07-31 ASSESSMENT — ANXIETY QUESTIONNAIRES
2. NOT BEING ABLE TO STOP OR CONTROL WORRYING: NEARLY EVERY DAY
5. BEING SO RESTLESS THAT IT IS HARD TO SIT STILL: SEVERAL DAYS
GAD7 TOTAL SCORE: 11
3. WORRYING TOO MUCH ABOUT DIFFERENT THINGS: NEARLY EVERY DAY
7. FEELING AFRAID AS IF SOMETHING AWFUL MIGHT HAPPEN: NOT AT ALL
7. FEELING AFRAID AS IF SOMETHING AWFUL MIGHT HAPPEN: NOT AT ALL
GAD7 TOTAL SCORE: 11
6. BECOMING EASILY ANNOYED OR IRRITABLE: SEVERAL DAYS
1. FEELING NERVOUS, ANXIOUS, OR ON EDGE: SEVERAL DAYS
GAD7 TOTAL SCORE: 11
IF YOU CHECKED OFF ANY PROBLEMS ON THIS QUESTIONNAIRE, HOW DIFFICULT HAVE THESE PROBLEMS MADE IT FOR YOU TO DO YOUR WORK, TAKE CARE OF THINGS AT HOME, OR GET ALONG WITH OTHER PEOPLE: NOT DIFFICULT AT ALL
4. TROUBLE RELAXING: MORE THAN HALF THE DAYS
8. IF YOU CHECKED OFF ANY PROBLEMS, HOW DIFFICULT HAVE THESE MADE IT FOR YOU TO DO YOUR WORK, TAKE CARE OF THINGS AT HOME, OR GET ALONG WITH OTHER PEOPLE?: NOT DIFFICULT AT ALL

## 2025-08-04 ASSESSMENT — PATIENT HEALTH QUESTIONNAIRE - PHQ9
SUM OF ALL RESPONSES TO PHQ QUESTIONS 1-9: 19
SUM OF ALL RESPONSES TO PHQ QUESTIONS 1-9: 19
10. IF YOU CHECKED OFF ANY PROBLEMS, HOW DIFFICULT HAVE THESE PROBLEMS MADE IT FOR YOU TO DO YOUR WORK, TAKE CARE OF THINGS AT HOME, OR GET ALONG WITH OTHER PEOPLE: NOT DIFFICULT AT ALL

## 2025-08-05 ENCOUNTER — VIRTUAL VISIT (OUTPATIENT)
Dept: FAMILY MEDICINE | Facility: CLINIC | Age: 65
End: 2025-08-05
Payer: MEDICARE

## 2025-08-05 DIAGNOSIS — G89.29 CHRONIC BILATERAL LOW BACK PAIN WITHOUT SCIATICA: ICD-10-CM

## 2025-08-05 DIAGNOSIS — M43.16 SPONDYLOLISTHESIS OF LUMBAR REGION: ICD-10-CM

## 2025-08-05 DIAGNOSIS — F90.0 ATTENTION-DEFICIT HYPERACTIVITY DISORDER, PREDOMINANTLY INATTENTIVE TYPE: ICD-10-CM

## 2025-08-05 DIAGNOSIS — M54.50 CHRONIC BILATERAL LOW BACK PAIN WITHOUT SCIATICA: ICD-10-CM

## 2025-08-05 DIAGNOSIS — F32.1 CURRENT MODERATE EPISODE OF MAJOR DEPRESSIVE DISORDER WITHOUT PRIOR EPISODE (H): Primary | ICD-10-CM

## 2025-08-05 PROCEDURE — 98006 SYNCH AUDIO-VIDEO EST MOD 30: CPT | Performed by: FAMILY MEDICINE

## 2025-08-05 RX ORDER — CITALOPRAM HYDROBROMIDE 10 MG/1
10 TABLET ORAL DAILY
Qty: 30 TABLET | Refills: 2 | Status: SHIPPED | OUTPATIENT
Start: 2025-08-05

## 2025-08-06 ENCOUNTER — PATIENT OUTREACH (OUTPATIENT)
Dept: CARE COORDINATION | Facility: CLINIC | Age: 65
End: 2025-08-06
Payer: MEDICARE

## 2025-08-09 ENCOUNTER — APPOINTMENT (OUTPATIENT)
Dept: CT IMAGING | Facility: CLINIC | Age: 65
End: 2025-08-09
Attending: EMERGENCY MEDICINE
Payer: MEDICARE

## 2025-08-09 ENCOUNTER — HOSPITAL ENCOUNTER (EMERGENCY)
Facility: CLINIC | Age: 65
Discharge: HOME | End: 2025-08-09
Attending: EMERGENCY MEDICINE | Admitting: EMERGENCY MEDICINE
Payer: MEDICARE

## 2025-08-09 VITALS
OXYGEN SATURATION: 97 % | HEART RATE: 58 BPM | BODY MASS INDEX: 28.23 KG/M2 | TEMPERATURE: 97.7 F | RESPIRATION RATE: 17 BRPM | DIASTOLIC BLOOD PRESSURE: 85 MMHG | SYSTOLIC BLOOD PRESSURE: 144 MMHG | HEIGHT: 73 IN | WEIGHT: 213 LBS

## 2025-08-09 DIAGNOSIS — K57.92 DIVERTICULITIS: Primary | ICD-10-CM

## 2025-08-09 DIAGNOSIS — R35.0 URINARY FREQUENCY: ICD-10-CM

## 2025-08-09 LAB
ALBUMIN UR-MCNC: NEGATIVE MG/DL
ANION GAP SERPL CALCULATED.3IONS-SCNC: 13 MMOL/L (ref 7–15)
APPEARANCE UR: CLEAR
BASOPHILS # BLD AUTO: 0.1 10E3/UL (ref 0–0.2)
BASOPHILS NFR BLD AUTO: 1 %
BILIRUB UR QL STRIP: NEGATIVE
BUN SERPL-MCNC: 13.4 MG/DL (ref 8–23)
CALCIUM SERPL-MCNC: 9.8 MG/DL (ref 8.8–10.4)
CHLORIDE SERPL-SCNC: 98 MMOL/L (ref 98–107)
COLOR UR AUTO: ABNORMAL
CREAT SERPL-MCNC: 0.98 MG/DL (ref 0.67–1.17)
EGFRCR SERPLBLD CKD-EPI 2021: 86 ML/MIN/1.73M2
EOSINOPHIL # BLD AUTO: 0.4 10E3/UL (ref 0–0.7)
EOSINOPHIL NFR BLD AUTO: 5 %
ERYTHROCYTE [DISTWIDTH] IN BLOOD BY AUTOMATED COUNT: 12.2 % (ref 10–15)
GLUCOSE SERPL-MCNC: 123 MG/DL (ref 70–99)
GLUCOSE UR STRIP-MCNC: NEGATIVE MG/DL
HCO3 SERPL-SCNC: 23 MMOL/L (ref 22–29)
HCT VFR BLD AUTO: 40.6 % (ref 40–53)
HGB BLD-MCNC: 14 G/DL (ref 13.3–17.7)
HGB UR QL STRIP: NEGATIVE
HYALINE CASTS: 1 /LPF
IMM GRANULOCYTES # BLD: 0 10E3/UL
IMM GRANULOCYTES NFR BLD: 1 %
KETONES UR STRIP-MCNC: NEGATIVE MG/DL
LEUKOCYTE ESTERASE UR QL STRIP: NEGATIVE
LYMPHOCYTES # BLD AUTO: 1.4 10E3/UL (ref 0.8–5.3)
LYMPHOCYTES NFR BLD AUTO: 17 %
MCH RBC QN AUTO: 31.3 PG (ref 26.5–33)
MCHC RBC AUTO-ENTMCNC: 34.5 G/DL (ref 31.5–36.5)
MCV RBC AUTO: 91 FL (ref 78–100)
MONOCYTES # BLD AUTO: 0.9 10E3/UL (ref 0–1.3)
MONOCYTES NFR BLD AUTO: 11 %
MUCOUS THREADS #/AREA URNS LPF: PRESENT /LPF
NEUTROPHILS # BLD AUTO: 5.5 10E3/UL (ref 1.6–8.3)
NEUTROPHILS NFR BLD AUTO: 66 %
NITRATE UR QL: NEGATIVE
NRBC # BLD AUTO: 0 10E3/UL
NRBC BLD AUTO-RTO: 0 /100
PH UR STRIP: 6.5 [PH] (ref 5–7)
PLATELET # BLD AUTO: 272 10E3/UL (ref 150–450)
POTASSIUM SERPL-SCNC: 4 MMOL/L (ref 3.4–5.3)
RBC # BLD AUTO: 4.48 10E6/UL (ref 4.4–5.9)
RBC URINE: <1 /HPF
SODIUM SERPL-SCNC: 134 MMOL/L (ref 135–145)
SP GR UR STRIP: 1.01 (ref 1–1.03)
UROBILINOGEN UR STRIP-MCNC: NORMAL MG/DL
WBC # BLD AUTO: 8.3 10E3/UL (ref 4–11)
WBC URINE: <1 /HPF

## 2025-08-09 PROCEDURE — 74177 CT ABD & PELVIS W/CONTRAST: CPT

## 2025-08-09 PROCEDURE — 80048 BASIC METABOLIC PNL TOTAL CA: CPT | Performed by: EMERGENCY MEDICINE

## 2025-08-09 PROCEDURE — 85004 AUTOMATED DIFF WBC COUNT: CPT | Performed by: EMERGENCY MEDICINE

## 2025-08-09 PROCEDURE — 36415 COLL VENOUS BLD VENIPUNCTURE: CPT | Performed by: EMERGENCY MEDICINE

## 2025-08-09 PROCEDURE — 99285 EMERGENCY DEPT VISIT HI MDM: CPT | Mod: 25 | Performed by: EMERGENCY MEDICINE

## 2025-08-09 PROCEDURE — 250N000011 HC RX IP 250 OP 636: Performed by: EMERGENCY MEDICINE

## 2025-08-09 PROCEDURE — 81003 URINALYSIS AUTO W/O SCOPE: CPT | Performed by: EMERGENCY MEDICINE

## 2025-08-09 RX ORDER — PHENAZOPYRIDINE HYDROCHLORIDE 200 MG/1
200 TABLET, FILM COATED ORAL 3 TIMES DAILY
Qty: 9 TABLET | Refills: 0 | Status: SHIPPED | OUTPATIENT
Start: 2025-08-09 | End: 2025-08-12

## 2025-08-09 RX ORDER — IOPAMIDOL 755 MG/ML
90 INJECTION, SOLUTION INTRAVASCULAR ONCE
Status: COMPLETED | OUTPATIENT
Start: 2025-08-09 | End: 2025-08-09

## 2025-08-09 RX ADMIN — IOPAMIDOL 90 ML: 755 INJECTION, SOLUTION INTRAVENOUS at 08:33

## 2025-08-09 ASSESSMENT — ACTIVITIES OF DAILY LIVING (ADL)
ADLS_ACUITY_SCORE: 58
ADLS_ACUITY_SCORE: 58

## 2025-08-12 ENCOUNTER — PATIENT OUTREACH (OUTPATIENT)
Dept: FAMILY MEDICINE | Facility: CLINIC | Age: 65
End: 2025-08-12
Payer: MEDICARE

## 2025-09-03 DIAGNOSIS — J31.0 CHRONIC RHINITIS: ICD-10-CM

## 2025-09-03 RX ORDER — FLUTICASONE PROPIONATE 50 MCG
SPRAY, SUSPENSION (ML) NASAL
Qty: 16 G | Refills: 3 | Status: SHIPPED | OUTPATIENT
Start: 2025-09-03

## (undated) DEVICE — ESU LIGASURE MARYLAND JAW OPEN VESSEL 23CM LF1923

## (undated) DEVICE — DAVINCI HOT SHEARS TIP COVER  400180

## (undated) DEVICE — PLATE GROUNDING ADULT W/CORD 9165L

## (undated) DEVICE — ELECTRODE PATIENT RETURN ADULT L10 FT 2 PLATE CORD 0855C

## (undated) DEVICE — GLOVE SURG PI ULTRA TOUCH M SZ 7 LF 42670

## (undated) DEVICE — GLOVE BIOGEL PI ULTRATOUCH G SZ 7.5 42175

## (undated) DEVICE — DRAPE C-ARM 60X42" 1013

## (undated) DEVICE — WRAP B-COOL TERI LUMBAR 08143380

## (undated) DEVICE — SUTURE VICRYL+ 2-0 27IN SH UND VCP417H

## (undated) DEVICE — SUCTION TIP YANKAUER W/O VENT K86

## (undated) DEVICE — CATH TRAY FOLEY SURESTEP 16FR DRAIN BAG STATOCK A899916

## (undated) DEVICE — SUTURE VICRYL+ 0 27IN CT-1 UND VCP260H

## (undated) DEVICE — SPONGE KITNER DISSECTING 7102*

## (undated) DEVICE — SU SILK 2-0 FS-1 18" 685G

## (undated) DEVICE — SYR 50ML SLIP TIP W/O NDL 309654

## (undated) DEVICE — RX SURGIFLO HEMOSTATIC MATRIX 8ML 2991

## (undated) DEVICE — PACK MINOR SINGLE BASIN SSK3001

## (undated) DEVICE — PIN PERCUTANEOUS NAVIGATION FOR SPINE O-ARM150MM 9733236

## (undated) DEVICE — PROTECTOR ARM STANDARD ONE STEP

## (undated) DEVICE — DRAPE U SPLIT 74X120" 29440

## (undated) DEVICE — POSITIONER ARM CRADLE LAMINECTOMY DISP

## (undated) DEVICE — CUSTOM PACK LAP CHOLE SBA5BLCHEA

## (undated) DEVICE — DRAPE C-ARMOR 5 SIDED 5523

## (undated) DEVICE — GOWN XLG DISP 9545

## (undated) DEVICE — DRSG STERI STRIP 1/2X4" R1547

## (undated) DEVICE — DRSG PRIMAPORE 03 1/8X6" 66000318

## (undated) DEVICE — CUST PACK ANTERIOR POSTERIOR FUSION SOP5BAPHEA

## (undated) DEVICE — DAVINCI XI OBTURATOR BLADELESS 8MM 470359

## (undated) DEVICE — MARKER SPHERES PASSIVE MEDT PACK 5 8801075

## (undated) DEVICE — DECANTER VIAL 2006S

## (undated) DEVICE — LUBRICANT INST ELECTROLUBE EL101

## (undated) DEVICE — SUTURE VICRYL+ 3-0 18IN PS-2 UND VCP497H

## (undated) DEVICE — TUBING SMOKE EVAC PNEUMOCLEAR HIGH FLOW 0620050250

## (undated) DEVICE — SOL WATER IRRIG 1000ML BOTTLE 2F7114

## (undated) DEVICE — GLOVE BIOGEL PI INDICATOR 8.0 LF 41680

## (undated) DEVICE — SUTURE VICRYL+ 2-0 27IN CT-1 UND VCP259H

## (undated) DEVICE — DRAPE SHEET REV FOLD 3/4 9349

## (undated) DEVICE — DAVINCI XI DRAPE ARM 470015

## (undated) DEVICE — DRSG GAUZE 4X4" 8044

## (undated) DEVICE — PAD POS XL 1X20X40IN PINK PIGAZZI

## (undated) DEVICE — PREP CHLORAPREP 26ML TINTED HI-LITE ORANGE 930815

## (undated) DEVICE — DRAPE O ARM TUBE 9732722

## (undated) DEVICE — NEEDLE SPINAL DISP 18GA X 3.5" QUINCKE 333350

## (undated) DEVICE — SU WND CLOSURE V-LOC 90 SZ 2-0 9" GS-22 VLOCM2145

## (undated) DEVICE — SPONGE LAP 18X18" X8435

## (undated) DEVICE — DRSG ADAPTIC 3X8" 6113

## (undated) DEVICE — NEEDLE HYPO MAGELLAN SAFETY 22GA 1 1/2IN 8881850215

## (undated) DEVICE — DRSG PRIMAPORE 04X11 3/4"

## (undated) DEVICE — DAVINCI XI DRAPE COLUMN 470341

## (undated) DEVICE — GLOVE UNDER INDICATOR PI SZ 7.0 LF 41670

## (undated) DEVICE — SOL NACL 0.9% IRRIG 1000ML BOTTLE 2F7124

## (undated) DEVICE — GLOVE SURG PI ULTRA TOUCH M SZ 8 LF

## (undated) DEVICE — PACK 9X6IN THRP HOT COLD CMPR  MED GEL 80104

## (undated) DEVICE — SU STRATAFIX MONOCRYL 3-0 SPIRAL PS-2 30CM SXMP1B106

## (undated) DEVICE — GLOVE SURG PI ULTRA TOUCH M SZ 7-1/2 LF

## (undated) DEVICE — TOOL DISSECT MIDAS MR8 14CM MATCH HEAD 3MM MR8-14MH30

## (undated) DEVICE — Device

## (undated) DEVICE — ESU PENCIL SMOKE EVAC W/ROCKER SWITCH 0703-047-000

## (undated) DEVICE — SUTURE PDS 0 60IN CTX+ LOOPED PDP990G

## (undated) DEVICE — SU MONOCRYL+ 4-0 18IN PS2 UND MCP496G

## (undated) DEVICE — ESU ELEC BLADE 6" COATED E1450-6

## (undated) DEVICE — NDL INSUFFLATION 13GA 120MM C2201

## (undated) DEVICE — SU DERMABOND ADVANCED .7ML DNX12

## (undated) DEVICE — CUSHION INSERT LG PRONE VIEW JACKSON TABLE

## (undated) DEVICE — DAVINCI XI SEAL UNIVERSAL 5-8MM 470361

## (undated) DEVICE — SUCTION MANIFOLD NEPTUNE 2 SYS 1 PORT 702-025-000

## (undated) DEVICE — SUTURE SILK 2-0 TIES 30IN SA85H

## (undated) DEVICE — CELL SAVER

## (undated) RX ORDER — ONDANSETRON 2 MG/ML
INJECTION INTRAMUSCULAR; INTRAVENOUS
Status: DISPENSED
Start: 2023-08-02

## (undated) RX ORDER — ONDANSETRON 2 MG/ML
INJECTION INTRAMUSCULAR; INTRAVENOUS
Status: DISPENSED
Start: 2024-06-11

## (undated) RX ORDER — PROPOFOL 10 MG/ML
INJECTION, EMULSION INTRAVENOUS
Status: DISPENSED
Start: 2023-08-02

## (undated) RX ORDER — DEXAMETHASONE SODIUM PHOSPHATE 10 MG/ML
INJECTION, EMULSION INTRAMUSCULAR; INTRAVENOUS
Status: DISPENSED
Start: 2023-08-02

## (undated) RX ORDER — GLYCOPYRROLATE 0.2 MG/ML
INJECTION, SOLUTION INTRAMUSCULAR; INTRAVENOUS
Status: DISPENSED
Start: 2023-08-02

## (undated) RX ORDER — DEXAMETHASONE SODIUM PHOSPHATE 10 MG/ML
INJECTION, EMULSION INTRAMUSCULAR; INTRAVENOUS
Status: DISPENSED
Start: 2024-06-11

## (undated) RX ORDER — LIDOCAINE HYDROCHLORIDE 10 MG/ML
INJECTION, SOLUTION EPIDURAL; INFILTRATION; INTRACAUDAL; PERINEURAL
Status: DISPENSED
Start: 2023-08-02

## (undated) RX ORDER — PROPOFOL 10 MG/ML
INJECTION, EMULSION INTRAVENOUS
Status: DISPENSED
Start: 2024-06-11

## (undated) RX ORDER — FENTANYL CITRATE 50 UG/ML
INJECTION, SOLUTION INTRAMUSCULAR; INTRAVENOUS
Status: DISPENSED
Start: 2024-06-11

## (undated) RX ORDER — LIDOCAINE HYDROCHLORIDE 10 MG/ML
INJECTION, SOLUTION EPIDURAL; INFILTRATION; INTRACAUDAL; PERINEURAL
Status: DISPENSED
Start: 2024-06-11

## (undated) RX ORDER — FENTANYL CITRATE 50 UG/ML
INJECTION, SOLUTION INTRAMUSCULAR; INTRAVENOUS
Status: DISPENSED
Start: 2023-08-02